# Patient Record
Sex: FEMALE | Race: BLACK OR AFRICAN AMERICAN | Employment: OTHER | RURAL
[De-identification: names, ages, dates, MRNs, and addresses within clinical notes are randomized per-mention and may not be internally consistent; named-entity substitution may affect disease eponyms.]

---

## 2017-01-05 ENCOUNTER — TELEPHONE (OUTPATIENT)
Dept: FAMILY MEDICINE CLINIC | Age: 82
End: 2017-01-05

## 2017-01-05 NOTE — TELEPHONE ENCOUNTER
[facility] called. They need to verify what type of immunization the patient needs. Please call to verify at [phone].

## 2017-02-13 DIAGNOSIS — E78.2 MIXED HYPERLIPIDEMIA: ICD-10-CM

## 2017-02-13 RX ORDER — SIMVASTATIN 40 MG/1
40 TABLET, FILM COATED ORAL
Qty: 90 TAB | Refills: 2 | Status: SHIPPED | OUTPATIENT
Start: 2017-02-13 | End: 2017-06-22 | Stop reason: SDUPTHER

## 2017-02-20 DIAGNOSIS — I10 ESSENTIAL HYPERTENSION WITH GOAL BLOOD PRESSURE LESS THAN 140/90: ICD-10-CM

## 2017-02-20 RX ORDER — LOSARTAN POTASSIUM 100 MG/1
100 TABLET ORAL DAILY
Qty: 90 TAB | Refills: 1 | Status: SHIPPED | OUTPATIENT
Start: 2017-02-20 | End: 2017-06-22 | Stop reason: SDUPTHER

## 2017-02-20 RX ORDER — FUROSEMIDE 20 MG/1
20 TABLET ORAL DAILY
Qty: 90 TAB | Refills: 1 | Status: SHIPPED | OUTPATIENT
Start: 2017-02-20 | End: 2017-04-13 | Stop reason: DRUGHIGH

## 2017-02-20 RX ORDER — HYDRALAZINE HYDROCHLORIDE 25 MG/1
25 TABLET, FILM COATED ORAL 3 TIMES DAILY
Qty: 90 TAB | Refills: 5 | Status: SHIPPED | OUTPATIENT
Start: 2017-02-20 | End: 2017-04-03 | Stop reason: SDUPTHER

## 2017-02-20 RX ORDER — FELODIPINE 10 MG/1
10 TABLET, EXTENDED RELEASE ORAL DAILY
Qty: 90 TAB | Refills: 1 | Status: SHIPPED | OUTPATIENT
Start: 2017-02-20 | End: 2017-03-14 | Stop reason: SDUPTHER

## 2017-02-20 RX ORDER — LEVOTHYROXINE SODIUM 25 UG/1
25 TABLET ORAL
Qty: 90 TAB | Refills: 3 | Status: SHIPPED | OUTPATIENT
Start: 2017-02-20 | End: 2017-04-26 | Stop reason: SDUPTHER

## 2017-03-14 DIAGNOSIS — I10 ESSENTIAL HYPERTENSION WITH GOAL BLOOD PRESSURE LESS THAN 140/90: ICD-10-CM

## 2017-03-14 RX ORDER — FELODIPINE 10 MG/1
10 TABLET, EXTENDED RELEASE ORAL DAILY
Qty: 90 TAB | Refills: 1 | Status: SHIPPED | OUTPATIENT
Start: 2017-03-14 | End: 2017-04-13 | Stop reason: DRUGHIGH

## 2017-03-24 ENCOUNTER — OFFICE VISIT (OUTPATIENT)
Dept: FAMILY MEDICINE CLINIC | Age: 82
End: 2017-03-24

## 2017-03-24 VITALS
DIASTOLIC BLOOD PRESSURE: 59 MMHG | BODY MASS INDEX: 21.97 KG/M2 | SYSTOLIC BLOOD PRESSURE: 144 MMHG | WEIGHT: 124 LBS | OXYGEN SATURATION: 95 % | HEIGHT: 63 IN | TEMPERATURE: 97.8 F | HEART RATE: 74 BPM | RESPIRATION RATE: 20 BRPM

## 2017-03-24 DIAGNOSIS — I10 ESSENTIAL HYPERTENSION: Chronic | ICD-10-CM

## 2017-03-24 DIAGNOSIS — J20.9 ACUTE BRONCHITIS, UNSPECIFIED ORGANISM: Primary | ICD-10-CM

## 2017-03-24 RX ORDER — ALBUTEROL SULFATE 90 UG/1
2 AEROSOL, METERED RESPIRATORY (INHALATION)
Qty: 1 INHALER | Refills: 5 | Status: SHIPPED | OUTPATIENT
Start: 2017-03-24

## 2017-03-24 RX ORDER — AZITHROMYCIN 250 MG/1
TABLET, FILM COATED ORAL
Qty: 6 TAB | Refills: 0 | Status: SHIPPED | OUTPATIENT
Start: 2017-03-24 | End: 2017-03-29

## 2017-03-24 NOTE — PATIENT INSTRUCTIONS
Bronchitis: Care Instructions  Your Care Instructions    Bronchitis is inflammation of the bronchial tubes, which carry air to the lungs. The tubes swell and produce mucus, or phlegm. The mucus and inflamed bronchial tubes make you cough. You may have trouble breathing. Most cases of bronchitis are caused by viruses like those that cause colds. Antibiotics usually do not help and they may be harmful. Bronchitis usually develops rapidly and lasts about 2 to 3 weeks in otherwise healthy people. Follow-up care is a key part of your treatment and safety. Be sure to make and go to all appointments, and call your doctor if you are having problems. It's also a good idea to know your test results and keep a list of the medicines you take. How can you care for yourself at home? · Take all medicines exactly as prescribed. Call your doctor if you think you are having a problem with your medicine. · Get some extra rest.  · Take an over-the-counter pain medicine, such as acetaminophen (Tylenol), ibuprofen (Advil, Motrin), or naproxen (Aleve) to reduce fever and relieve body aches. Read and follow all instructions on the label. · Do not take two or more pain medicines at the same time unless the doctor told you to. Many pain medicines have acetaminophen, which is Tylenol. Too much acetaminophen (Tylenol) can be harmful. · Take an over-the-counter cough medicine that contains dextromethorphan to help quiet a dry, hacking cough so that you can sleep. Avoid cough medicines that have more than one active ingredient. Read and follow all instructions on the label. · Breathe moist air from a humidifier, hot shower, or sink filled with hot water. The heat and moisture will thin mucus so you can cough it out. · Do not smoke. Smoking can make bronchitis worse. If you need help quitting, talk to your doctor about stop-smoking programs and medicines. These can increase your chances of quitting for good.   When should you call for help? Call 911 anytime you think you may need emergency care. For example, call if:  · You have severe trouble breathing. Call your doctor now or seek immediate medical care if:  · You have new or worse trouble breathing. · You cough up dark brown or bloody mucus (sputum). · You have a new or higher fever. · You have a new rash. Watch closely for changes in your health, and be sure to contact your doctor if:  · You cough more deeply or more often, especially if you notice more mucus or a change in the color of your mucus. · You are not getting better as expected. Where can you learn more? Go to http://rafi-alejandro.info/. Enter H333 in the search box to learn more about \"Bronchitis: Care Instructions. \"  Current as of: May 23, 2016  Content Version: 11.1  © 2484-1163 [employer]. Care instructions adapted under license by [employer] (which disclaims liability or warranty for this information). If you have questions about a medical condition or this instruction, always ask your healthcare professional. [address] any warranty or liability for your use of this information.

## 2017-03-24 NOTE — PROGRESS NOTES
Progress Note    Patient: Leann Carmen MRN: [id number]  SSN: [id number]    YOB: 1923  Age: 80 y.o. Sex: female        Chief Complaint   Patient presents with    Cold Symptoms     x1 days, cough         Subjective:     Encounter Diagnoses   Name Primary?  Acute bronchitis, unspecified organism: She has been coughing for several days. She has not had a fever but she did have a sweat last night. The mucus she is coughing up is primarily clear scant amounts. He can hear her audibly wheezing with cough. She has strained a left anterior intercostal muscle from the coughing and will use a throw pillow to brace her chest wall she does cough. I have explained to her that spasms of cough or really an asthma equivalent in this when she should use her inhaler. Yes    Essential hypertension: Her blood pressure is elevated as would be expected from upper respiratory infection. BP Readings from Last 3 Encounters:   03/24/17 144/59   11/22/16 149/62   07/06/16 138/60     The patient reports:  taking medications as instructed, no medication side effects noted, no TIA's, no chest pain on exertion, no dyspnea on exertion, no swelling of ankles. Lab Results   Component Value Date/Time    Sodium 142 11/22/2016 10:34 AM    Potassium 4.2 11/22/2016 10:34 AM    Chloride 101 11/22/2016 10:34 AM    CO2 24 11/22/2016 10:34 AM    Anion gap 10 10/06/2010 10:34 AM    Glucose 88 11/22/2016 10:34 AM    BUN 43 11/22/2016 10:34 AM    Creatinine 1.64 11/22/2016 10:34 AM    BUN/Creatinine ratio 26 11/22/2016 10:34 AM    GFR est AA 31 11/22/2016 10:34 AM    GFR est non-AA 27 11/22/2016 10:34 AM    Calcium 10.9 11/22/2016 10:34 AM    Bilirubin, total 0.5 11/22/2016 10:34 AM    AST (SGOT) 14 11/22/2016 10:34 AM    Alk.  phosphatase 82 11/22/2016 10:34 AM    Protein, total 7.0 11/22/2016 10:34 AM    Albumin 4.4 11/22/2016 10:34 AM    Globulin 3.2 10/06/2010 10:34 AM    A-G Ratio 1.7 11/22/2016 10:34 AM    ALT (SGPT) 8 11/22/2016 10:34 AM     Our goal is to normalize the blood pressure to decrease the risks of strokes and heart attacks. The patient is in agreement with the plan. Current and past medical information:    Current Medications after this visit[de-identified]   Current Outpatient Prescriptions   Medication Sig    azithromycin (ZITHROMAX) 250 mg tablet Take 2 tablets today, then take 1 tablet daily    albuterol (PROVENTIL HFA, VENTOLIN HFA, PROAIR HFA) 90 mcg/actuation inhaler Take 2 Puffs by inhalation every six (6) hours as needed for Wheezing (use her insurance's generic).  felodipine (PLENDIL SR) 10 mg 24 hr tablet Take 1 Tab by mouth daily.  furosemide (LASIX) 20 mg tablet Take 1 Tab by mouth daily. Indications: hypertension    hydrALAZINE (APRESOLINE) 25 mg tablet Take 1 Tab by mouth three (3) times daily. Indications: hypertension    levothyroxine (SYNTHROID) 25 mcg tablet Take 1 Tab by mouth Daily (before breakfast).  losartan (COZAAR) 100 mg tablet Take 1 Tab by mouth daily.  simvastatin (ZOCOR) 40 mg tablet Take 1 Tab by mouth nightly for 90 days. Indications: mixed hyperlipidemia    omega-3 fatty acids-vitamin e (FISH OIL) 1,000 mg Cap Take 1 Cap by mouth.  aspirin delayed-release 81 mg tablet Take 81 mg by mouth daily.  cholecalciferol, vitamin d3, (VITAMIN D) 1,000 unit tablet Take 1,000 Units by mouth daily.  cyanocobalamin (VITAMIN B-12) 1,000 mcg tablet Take 1,000 mcg by mouth daily.  fexofenadine (ALLEGRA) 180 mg tablet Take 1 Tab by mouth daily. For allergies. No current facility-administered medications for this visit.         Patient Active Problem List    Diagnosis Date Noted    Hypertension 05/24/2010     Priority: 1 - One    Hyperlipidemia 05/24/2010     Priority: 1 - One    Arthritis 05/24/2010     Priority: 1 - One    Hx-TIA (transient ischemic attack) 05/24/2010     Priority: 1 - One    Migraine 05/24/2010     Priority: 1 - One    S/P hysterectomy 10/10/2014    Hypothyroidism 08/05/2013    CRI (chronic renal insufficiency) 02/08/2011       Past Medical History:   Diagnosis Date    Arthritis 5/24/2010    Hx-TIA (transient ischemic attack) 5/24/2010    Hyperlipidemia 5/24/2010    Hypertension 5/24/2010    Migraine 5/24/2010       Allergies   Allergen Reactions    Ace Inhibitors Other (comments)     Cough. Also has unclear reaction to ARB's       Past Surgical History:   Procedure Laterality Date    HX GYN      hysterectomy,btl    HX GYN  9/6/11    complete hysterectomy    HX HEENT  10/10. bilateral cataract removal.       Social History     Social History    Marital status:      Spouse name: N/A    Number of children: N/A    Years of education: N/A     Social History Main Topics    Smoking status: Never Smoker    Smokeless tobacco: Never Used    Alcohol use No    Drug use: No    Sexual activity: Not Asked     Other Topics Concern    None     Social History Narrative       Review of Systems   Constitutional: Positive for diaphoresis. Negative for chills, fever, malaise/fatigue and weight loss. HENT: Negative. Negative for hearing loss. Eyes: Negative. Negative for blurred vision and double vision. Respiratory: Positive for cough, sputum production and wheezing. Negative for hemoptysis and shortness of breath. Cardiovascular: Negative. Negative for chest pain, palpitations and orthopnea. Gastrointestinal: Negative. Negative for abdominal pain, blood in stool, heartburn, nausea and vomiting. Genitourinary: Negative. Negative for dysuria, frequency and urgency. Musculoskeletal: Negative. Negative for back pain, myalgias and neck pain. Skin: Negative. Negative for rash. Neurological: Negative. Negative for dizziness, tingling, tremors, weakness and headaches. Endo/Heme/Allergies: Negative. Psychiatric/Behavioral: Negative. Negative for depression.         Objective:     Vitals:    03/24/17 1415   BP: 144/59   Pulse: 74   Resp: 20   Temp: 97.8 °F (36.6 °C)   TempSrc: Oral   SpO2: 95%   Weight: 124 lb (56.2 kg)   Height: 5' 3\" (1.6 m)      Body mass index is 21.97 kg/(m^2). Physical Exam   Constitutional: She is oriented to person, place, and time and well-developed, well-nourished, and in no distress. No distress. HENT:   Head: Normocephalic and atraumatic. Mouth/Throat: Oropharynx is clear and moist.   Eyes: Conjunctivae are normal.   Neck: No tracheal deviation present. No thyromegaly present. Cardiovascular: Normal rate, regular rhythm and normal heart sounds. No murmur heard. Pulmonary/Chest: Effort normal. No respiratory distress. She has a wheezy cough and bilateral wet rhonchi in both bases. Abdominal: Soft. She exhibits no distension. Lymphadenopathy:     She has no cervical adenopathy. Neurological: She is alert and oriented to person, place, and time. Skin: Skin is warm. No rash noted. She is not diaphoretic. No erythema. Psychiatric: Mood and affect normal.   Nursing note and vitals reviewed. Health Maintenance Due   Topic Date Due    MEDICARE YEARLY EXAM  02/25/2017         Assessment and orders:       ICD-10-CM ICD-9-CM    1. Acute bronchitis, unspecified organism-with wheezing  J20.9 466.0  azithromycin 250-2 today and one for days 2 through 5   Albuterol inhaler-personally instructed her on how best to use it. 2. Essential hypertension I10 401.9  follow this at home, recheck 1 well    3. Wheezing. Patient has no history                                         albuterol inhaler             of asthma    Plan of care:  Discussed diagnoses in detail with patient. Medication risks/benefits/side effects discussed with patient. All of the patient's questions were addressed. The patient understands and agrees with our plan of care.     The patient knows to call back if they are unsure of or forget any changes we discussed today or if the symptoms change. The patient received an After-Visit Summary which contains VS, orders, medication list and allergy list. This can be used as a \"mini-medical record\" should they have to seek medical care while out of town. Patient Care Team:  Kervin Claros MD as PCP - General    Follow-up Disposition:  Return if symptoms worsen or fail to improve.     Future Appointments  Date Time Provider Tiago De Oliveira   4/3/2017 3:05 PM Kervin Claros MD [facility] SCHED   5/23/2017 9:50 AM Kervin Claros MD [facility] SCHED       Signed By: Kervin Claros MD     March 24, 2017

## 2017-03-24 NOTE — PROGRESS NOTES
Reviewed record in preparation for visit and have necessary documentation  Pt did not bring medication to office visit for review  Information was given to pt on Advanced Directives, Living Will  opportunity was given for questions  Goals that were addressed and/or need to be completed during or after this appointment include   Health Maintenance Due   Topic Date Due    MEDICARE YEARLY EXAM  02/25/2017

## 2017-03-24 NOTE — MR AVS SNAPSHOT
Visit Information Date & Time Provider Department Dept. Phone Encounter #  
 3/24/2017  2:25 PM James Riggs [address] [phone] [id number] Follow-up Instructions Return if symptoms worsen or fail to improve. Your Appointments 4/3/2017  3:05 PM  
Medicare Physical with James Riggs MD  
[address] ([facility]) Appt Note: [address] ; provider cancellation on 02/24/2017 [address]   
[phone]  
  
   
 [facility] [address]  
  
    
 5/23/2017  9:50 AM  
ROUTINE CARE with James Riggs MD  
[address] [facility]) Appt Note: 5 mo f/u-HTN (fasting) [address] [facility] [address] [address]  
[place] [address] [address] Upcoming Health Maintenance Date Due  
 MEDICARE YEARLY EXAM 2/25/2017 GLAUCOMA SCREENING Q2Y 6/2/2017 DTaP/Tdap/Td series (2 - Td) 1/6/2027 Allergies as of 3/24/2017  Review Complete On: 3/24/2017 By: Catalina Shaikh LPN Severity Noted Reaction Type Reactions Ace Inhibitors  05/24/2010    Other (comments) Cough. Also has unclear reaction to ARB's  
  
Current Immunizations  Reviewed on 2/25/2016 Name Date H1N1 FLU VACCINE 2/24/2010 Influenza Vaccine 10/15/2015, 10/10/2014, 10/4/2013, 2/7/2013 Influenza Vaccine (Quad) PF 11/22/2016 Influenza Vaccine Split 10/14/2011, 10/6/2010 Influenza Vaccine Whole 12/6/2009 Pneumococcal Conjugate (PCV-13) 12/10/2015 Pneumococcal Vaccine (Unspecified Type) 10/6/2008, 9/8/2003 TD Vaccine 6/21/2006 Tdap 1/6/2017 Not reviewed this visit You Were Diagnosed With   
  
 Codes Comments Acute bronchitis, unspecified organism    -  Primary ICD-10-CM: J20.9 ICD-9-CM: 466.0  Essential hypertension     ICD-10-CM: I10 
 ICD-9-CM: 401.9 Pure hypercholesterolemia     ICD-10-CM: E78.00 ICD-9-CM: 272.0 Vitals BP Pulse Temp Resp Height(growth percentile) Weight(growth percentile) 144/59 (BP 1 Location: Left arm, BP Patient Position: Sitting) 74 97.8 °F (36.6 °C) (Oral) 20 5' 3\" (1.6 m) 124 lb (56.2 kg) SpO2 BMI OB Status Smoking Status 95% 21.97 kg/m2 Hysterectomy Never Smoker Vitals History BMI and BSA Data Body Mass Index Body Surface Area  
 21.97 kg/m 2 1.58 m 2 Preferred Pharmacy Pharmacy Name Phone [facility] [address] [facility] [address] [phone] Your Updated Medication List  
  
   
This list is accurate as of: 3/24/17  2:48 PM.  Always use your most recent med list.  
  
  
  
  
 albuterol 90 mcg/actuation inhaler Commonly known as:  PROVENTIL HFA, VENTOLIN HFA, PROAIR HFA Take 2 Puffs by inhalation every six (6) hours as needed for Wheezing (use her insurance's generic). aspirin delayed-release 81 mg tablet Take 81 mg by mouth daily. azithromycin 250 mg tablet Commonly known as:  Nusrat Sherrie Take 2 tablets today, then take 1 tablet daily  
  
 felodipine 10 mg 24 hr tablet Commonly known as:  PLENDIL SR Take 1 Tab by mouth daily. fexofenadine 180 mg tablet Commonly known as:  Liz Cyphers Take 1 Tab by mouth daily. For allergies. FISH OIL 1,000 mg Cap Generic drug:  omega-3 fatty acids-vitamin e Take 1 Cap by mouth. furosemide 20 mg tablet Commonly known as:  LASIX Take 1 Tab by mouth daily. Indications: hypertension  
  
 hydrALAZINE 25 mg tablet Commonly known as:  APRESOLINE Take 1 Tab by mouth three (3) times daily. Indications: hypertension  
  
 levothyroxine 25 mcg tablet Commonly known as:  SYNTHROID Take 1 Tab by mouth Daily (before breakfast). losartan 100 mg tablet Commonly known as:  COZAAR Take 1 Tab by mouth daily. simvastatin 40 mg tablet Commonly known as:  ZOCOR Take 1 Tab by mouth nightly for 90 days. Indications: mixed hyperlipidemia VITAMIN B-12 1,000 mcg tablet Generic drug:  cyanocobalamin Take 1,000 mcg by mouth daily. VITAMIN D3 1,000 unit tablet Generic drug:  cholecalciferol Take 1,000 Units by mouth daily. Prescriptions Sent to Pharmacy Refills  
 azithromycin (ZITHROMAX) 250 mg tablet 0 Sig: Take 2 tablets today, then take 1 tablet daily Class: Normal  
 Pharmacy: [id number] [place] [address] Ph #: [phone]  
 albuterol (PROVENTIL HFA, VENTOLIN HFA, PROAIR HFA) 90 mcg/actuation inhaler 5 Sig: Take 2 Puffs by inhalation every six (6) hours as needed for Wheezing (use her insurance's generic). Class: Normal  
 Pharmacy: [id number] [place] [address] Ph #: [phone] Route: Inhalation Follow-up Instructions Return if symptoms worsen or fail to improve. Patient Instructions Bronchitis: Care Instructions Your Care Instructions Bronchitis is inflammation of the bronchial tubes, which carry air to the lungs. The tubes swell and produce mucus, or phlegm. The mucus and inflamed bronchial tubes make you cough. You may have trouble breathing. Most cases of bronchitis are caused by viruses like those that cause colds. Antibiotics usually do not help and they may be harmful. Bronchitis usually develops rapidly and lasts about 2 to 3 weeks in otherwise healthy people. Follow-up care is a key part of your treatment and safety. Be sure to make and go to all appointments, and call your doctor if you are having problems. It's also a good idea to know your test results and keep a list of the medicines you take. How can you care for yourself at home? · Take all medicines exactly as prescribed. Call your doctor if you think you are having a problem with your medicine.  
· Get some extra rest. 
 · Take an over-the-counter pain medicine, such as acetaminophen (Tylenol), ibuprofen (Advil, Motrin), or naproxen (Aleve) to reduce fever and relieve body aches. Read and follow all instructions on the label. · Do not take two or more pain medicines at the same time unless the doctor told you to. Many pain medicines have acetaminophen, which is Tylenol. Too much acetaminophen (Tylenol) can be harmful. · Take an over-the-counter cough medicine that contains dextromethorphan to help quiet a dry, hacking cough so that you can sleep. Avoid cough medicines that have more than one active ingredient. Read and follow all instructions on the label. · Breathe moist air from a humidifier, hot shower, or sink filled with hot water. The heat and moisture will thin mucus so you can cough it out. · Do not smoke. Smoking can make bronchitis worse. If you need help quitting, talk to your doctor about stop-smoking programs and medicines. These can increase your chances of quitting for good. When should you call for help? Call 911 anytime you think you may need emergency care. For example, call if: 
· You have severe trouble breathing. Call your doctor now or seek immediate medical care if: 
· You have new or worse trouble breathing. · You cough up dark brown or bloody mucus (sputum). · You have a new or higher fever. · You have a new rash. Watch closely for changes in your health, and be sure to contact your doctor if: 
· You cough more deeply or more often, especially if you notice more mucus or a change in the color of your mucus. · You are not getting better as expected. Where can you learn more? Go to http://rafi-alejandro.info/. Enter H333 in the search box to learn more about \"Bronchitis: Care Instructions. \" Current as of: May 23, 2016 Content Version: 11.1 © 9244-5471 [employer].  Care instructions adapted under license by [address] (which disclaims liability or warranty for this information). If you have questions about a medical condition or this instruction, always ask your healthcare professional. [address] any warranty or liability for your use of this information. Introducing [facility]! [employer] introduces [employer] patient portal. Now you can access parts of your medical record, email your doctor's office, and request medication refills online. 1. In your internet browser, go to [email or web address] 2. Click on the First Time User? Click Here link in the Sign In box. You will see the New Member Sign Up page. 3. Enter your [employer] Access Code exactly as it appears below. You will not need to use this code after youve completed the sign-up process. If you do not sign up before the expiration date, you must request a new code. · [employer] Access Code: [email or web address] Expires: 6/22/2017  2:04 PM 
 
4. Enter the last four digits of your Social Security Number (xxxx) and Date of Birth (mm/dd/yyyy) as indicated and click Submit. You will be taken to the next sign-up page. 5. Create a [employer] ID. This will be your [employer] login ID and cannot be changed, so think of one that is secure and easy to remember. 6. Create a [employer] password. You can change your password at any time. 7. Enter your Password Reset Question and Answer. This can be used at a later time if you forget your password. 8. Enter your e-mail address. You will receive e-mail notification when new information is available in [address] 9. Click Sign Up. You can now view and download portions of your medical record. 10. Click the Download Summary menu link to download a portable copy of your medical information. If you have questions, please visit the Frequently Asked Questions section of the [employer] website.  Remember, [employer] is NOT to be used for urgent needs. For medical emergencies, dial 911. Now available from your iPhone and Android! Please provide this summary of care documentation to your next provider. Your primary care clinician is listed as [address]. If you have any questions after today's visit, please call [phone].

## 2017-04-03 ENCOUNTER — OFFICE VISIT (OUTPATIENT)
Dept: FAMILY MEDICINE CLINIC | Age: 82
End: 2017-04-03

## 2017-04-03 VITALS
BODY MASS INDEX: 21.97 KG/M2 | OXYGEN SATURATION: 97 % | TEMPERATURE: 98.3 F | HEIGHT: 63 IN | HEART RATE: 67 BPM | WEIGHT: 124 LBS | SYSTOLIC BLOOD PRESSURE: 175 MMHG | DIASTOLIC BLOOD PRESSURE: 66 MMHG | RESPIRATION RATE: 16 BRPM

## 2017-04-03 DIAGNOSIS — Z13.31 SCREENING FOR DEPRESSION: ICD-10-CM

## 2017-04-03 DIAGNOSIS — I10 ESSENTIAL HYPERTENSION WITH GOAL BLOOD PRESSURE LESS THAN 140/90: ICD-10-CM

## 2017-04-03 DIAGNOSIS — Z00.00 MEDICARE ANNUAL WELLNESS VISIT, SUBSEQUENT: Primary | ICD-10-CM

## 2017-04-03 DIAGNOSIS — Z13.39 SCREENING FOR ALCOHOLISM: ICD-10-CM

## 2017-04-03 RX ORDER — HYDRALAZINE HYDROCHLORIDE 50 MG/1
50 TABLET, FILM COATED ORAL 3 TIMES DAILY
Qty: 90 TAB | Refills: 5 | Status: SHIPPED | OUTPATIENT
Start: 2017-04-03 | End: 2017-04-13 | Stop reason: SDUPTHER

## 2017-04-03 NOTE — PATIENT INSTRUCTIONS
Learning About High Blood Pressure  What is high blood pressure? Blood pressure is a measure of how hard the blood pushes against the walls of your arteries. It's normal for blood pressure to go up and down throughout the day, but if it stays up, you have high blood pressure. Another name for high blood pressure is hypertension. Two numbers tell you your blood pressure. The first number is the systolic pressure. It shows how hard the blood pushes when your heart is pumping. The second number is the diastolic pressure. It shows how hard the blood pushes between heartbeats, when your heart is relaxed and filling with blood. A blood pressure of less than 120/80 (say \"120 over 80\") is ideal for an adult. High blood pressure is 140/90 or higher. You have high blood pressure if your top number is 140 or higher or your bottom number is 90 or higher, or both. Many people fall into the category in between, called prehypertension. People with prehypertension need to make lifestyle changes to bring their blood pressure down and help prevent or delay high blood pressure. What happens when you have high blood pressure? · Blood flows through your arteries with too much force. Over time, this damages the walls of your arteries. But you can't feel it. High blood pressure usually doesn't cause symptoms. · Fat and calcium start to build up in your arteries. This buildup is called plaque. Plaque makes your arteries narrower and stiffer. Blood can't flow through them as easily. · This lack of good blood flow starts to damage some of the organs in your body. This can lead to problems such as coronary artery disease and heart attack, heart failure, stroke, kidney failure, and eye damage. How can you prevent high blood pressure? · Stay at a healthy weight. · Try to limit how much sodium you eat to less than 2,300 milligrams (mg) a day.  If you limit your sodium to 1,500 mg a day, you can lower your blood pressure even more.  ¨ Buy foods that are labeled \"unsalted,\" \"sodium-free,\" or \"low-sodium. \" Foods labeled \"reduced-sodium\" and \"light sodium\" may still have too much sodium. ¨ Flavor your food with garlic, lemon juice, onion, vinegar, herbs, and spices instead of salt. Do not use soy sauce, steak sauce, onion salt, garlic salt, mustard, or ketchup on your food. ¨ Use less salt (or none) when recipes call for it. You can often use half the salt a recipe calls for without losing flavor. · Be physically active. Get at least 30 minutes of exercise on most days of the week. Walking is a good choice. You also may want to do other activities, such as running, swimming, cycling, or playing tennis or team sports. · Limit alcohol to 2 drinks a day for men and 1 drink a day for women. · Eat plenty of fruits, vegetables, and low-fat dairy products. Eat less saturated and total fats. How is high blood pressure treated? · Your doctor will suggest making lifestyle changes. For example, your doctor may ask you to eat healthy foods, quit smoking, lose extra weight, and be more active. · If lifestyle changes don't help enough or your blood pressure is very high, you will have to take medicine every day. Follow-up care is a key part of your treatment and safety. Be sure to make and go to all appointments, and call your doctor if you are having problems. It's also a good idea to know your test results and keep a list of the medicines you take. Where can you learn more? Go to http://rafi-alejandro.info/. Enter P501 in the search box to learn more about \"Learning About High Blood Pressure. \"  Current as of: March 23, 2016  Content Version: 11.2  © 9670-2868 [employer]. Care instructions adapted under license by [employer] (which disclaims liability or warranty for this information).  If you have questions about a medical condition or this instruction, always ask your healthcare professional. [employer] disclaims any warranty or liability for your use of this information. Billy Simpson with [facility]  [address] [place] [address],  [address] [place], [address] [place] [address]  [phone]    Monitor blood pressure outside the office several times weekly at different times during the day and evening. Bring the record to me in 3 weeks for review.     Blood Pressure Record     Patient Name:  ______________________ :  ______________________    Date/Time BP Reading Pulse

## 2017-04-03 NOTE — MR AVS SNAPSHOT
Visit Information Date & Time Provider Department Dept. Phone Encounter #  
 4/3/2017  3:05 PM April Guadalupe  [facility] [phone] [id number] Follow-up Instructions Return for 2 weeks for BP check. Sola Brittni Your Appointments 5/23/2017  9:50 AM  
ROUTINE CARE with April Guadalupe MD  
[facility]) Appt Note: 5 mo f/u-HTN (fasting) [address] [address]  
[place] [address] [address] Upcoming Health Maintenance Date Due  
 MEDICARE YEARLY EXAM 2/25/2017 GLAUCOMA SCREENING Q2Y 6/2/2017 DTaP/Tdap/Td series (2 - Td) 1/6/2027 Allergies as of 4/3/2017  Review Complete On: 4/3/2017 By: April Guadalupe MD  
  
 Severity Noted Reaction Type Reactions Ace Inhibitors  05/24/2010    Other (comments) Cough. Also has unclear reaction to ARB's  
  
Current Immunizations  Reviewed on 2/25/2016 Name Date H1N1 FLU VACCINE 2/24/2010 Influenza Vaccine 10/15/2015, 10/10/2014, 10/4/2013, 2/7/2013 Influenza Vaccine (Quad) PF 11/22/2016 Influenza Vaccine Split 10/14/2011, 10/6/2010 Influenza Vaccine Whole 12/6/2009 Pneumococcal Conjugate (PCV-13) 12/10/2015 Pneumococcal Vaccine (Unspecified Type) 10/6/2008, 9/8/2003 TD Vaccine 6/21/2006 Tdap 1/6/2017 Not reviewed this visit You Were Diagnosed With   
  
 Codes Comments Medicare annual wellness visit, subsequent    -  Primary ICD-10-CM: Z00.00 ICD-9-CM: V70.0 Essential hypertension with goal blood pressure less than 140/90     ICD-10-CM: I10 
ICD-9-CM: 401.9 Screening for alcoholism     ICD-10-CM: Z13.89 ICD-9-CM: V79.1 Screening for depression     ICD-10-CM: Z13.89 ICD-9-CM: V79.0 Vitals BP Pulse Temp Resp Height(growth percentile) Weight(growth percentile) 175/66 (BP 1 Location: Left arm, BP Patient Position: Sitting) 67 98.3 °F (36.8 °C) (Oral) 16 5' 3\" (1.6 m) 124 lb (56.2 kg) SpO2 BMI OB Status Smoking Status 97% 21.97 kg/m2 Hysterectomy Never Smoker Vitals History BMI and BSA Data Body Mass Index Body Surface Area  
 21.97 kg/m 2 1.58 m 2 Preferred Pharmacy Pharmacy Name Phone [facility] 79 [phone] Your Updated Medication List  
  
   
This list is accurate as of: 4/3/17  3:33 PM.  Always use your most recent med list.  
  
  
  
  
 albuterol 90 mcg/actuation inhaler Commonly known as:  PROVENTIL HFA, VENTOLIN HFA, PROAIR HFA Take 2 Puffs by inhalation every six (6) hours as needed for Wheezing (use her insurance's generic). aspirin delayed-release 81 mg tablet Take 81 mg by mouth daily. felodipine 10 mg 24 hr tablet Commonly known as:  PLENDIL SR Take 1 Tab by mouth daily. fexofenadine 180 mg tablet Commonly known as:  Luellen Den Take 1 Tab by mouth daily. For allergies. FISH OIL 1,000 mg Cap Generic drug:  omega-3 fatty acids-vitamin e Take 1 Cap by mouth. furosemide 20 mg tablet Commonly known as:  LASIX Take 1 Tab by mouth daily. Indications: hypertension  
  
 hydrALAZINE 50 mg tablet Commonly known as:  APRESOLINE Take 1 Tab by mouth three (3) times daily. Indications: hypertension  
  
 levothyroxine 25 mcg tablet Commonly known as:  SYNTHROID Take 1 Tab by mouth Daily (before breakfast). losartan 100 mg tablet Commonly known as:  COZAAR Take 1 Tab by mouth daily. simvastatin 40 mg tablet Commonly known as:  ZOCOR Take 1 Tab by mouth nightly for 90 days. Indications: mixed hyperlipidemia VITAMIN B-12 1,000 mcg tablet Generic drug:  cyanocobalamin Take 1,000 mcg by mouth daily. VITAMIN D3 1,000 unit tablet Generic drug:  cholecalciferol Take 1,000 Units by mouth daily. Prescriptions Sent to Pharmacy Refills  
 hydrALAZINE (APRESOLINE) 50 mg tablet 5 Sig: Take 1 Tab by mouth three (3) times daily. Indications: hypertension Class: Normal  
 Pharmacy: [address] #: [phone] Route: Oral  
  
We Performed the Following IN ANNUAL ALCOHOL SCREEN 15 MIN [id number] [facility]] [address] [address] [employer] [ [facility]] Follow-up Instructions Return for 2 weeks for BP check. Shekhar Brooks Patient Instructions Learning About High Blood Pressure What is high blood pressure? Blood pressure is a measure of how hard the blood pushes against the walls of your arteries. It's normal for blood pressure to go up and down throughout the day, but if it stays up, you have high blood pressure. Another name for high blood pressure is hypertension. Two numbers tell you your blood pressure. The first number is the systolic pressure. It shows how hard the blood pushes when your heart is pumping. The second number is the diastolic pressure. It shows how hard the blood pushes between heartbeats, when your heart is relaxed and filling with blood. A blood pressure of less than 120/80 (say \"120 over 80\") is ideal for an adult. High blood pressure is 140/90 or higher. You have high blood pressure if your top number is 140 or higher or your bottom number is 90 or higher, or both. Many people fall into the category in between, called prehypertension. People with prehypertension need to make lifestyle changes to bring their blood pressure down and help prevent or delay high blood pressure. What happens when you have high blood pressure? · Blood flows through your arteries with too much force. Over time, this damages the walls of your arteries. But you can't feel it. High blood pressure usually doesn't cause symptoms. · Fat and calcium start to build up in your arteries. This buildup is called plaque. Plaque makes your arteries narrower and stiffer. Blood can't flow through them as easily. · This lack of good blood flow starts to damage some of the organs in your body. This can lead to problems such as coronary artery disease and heart attack, heart failure, stroke, kidney failure, and eye damage. How can you prevent high blood pressure? · Stay at a healthy weight. · Try to limit how much sodium you eat to less than 2,300 milligrams (mg) a day. If you limit your sodium to 1,500 mg a day, you can lower your blood pressure even more. ¨ Buy foods that are labeled \"unsalted,\" \"sodium-free,\" or \"low-sodium. \" Foods labeled \"reduced-sodium\" and \"light sodium\" may still have too much sodium. ¨ Flavor your food with garlic, lemon juice, onion, vinegar, herbs, and spices instead of salt. Do not use soy sauce, steak sauce, onion salt, garlic salt, mustard, or ketchup on your food. ¨ Use less salt (or none) when recipes call for it. You can often use half the salt a recipe calls for without losing flavor. · Be physically active. Get at least 30 minutes of exercise on most days of the week. Walking is a good choice. You also may want to do other activities, such as running, swimming, cycling, or playing tennis or team sports. · Limit alcohol to 2 drinks a day for men and 1 drink a day for women. · Eat plenty of fruits, vegetables, and low-fat dairy products. Eat less saturated and total fats. How is high blood pressure treated? · Your doctor will suggest making lifestyle changes. For example, your doctor may ask you to eat healthy foods, quit smoking, lose extra weight, and be more active. · If lifestyle changes don't help enough or your blood pressure is very high, you will have to take medicine every day. Follow-up care is a key part of your treatment and safety.  Be sure to make and go to all appointments, and call your doctor if you are having problems. It's also a good idea to know your test results and keep a list of the medicines you take. Where can you learn more? Go to http://rafi-alejandro.info/. Enter P501 in the search box to learn more about \"Learning About High Blood Pressure. \" Current as of: 2016 Content Version: 11.2 © 3353-6677 [employer]. Care instructions adapted under license by [employer] (which disclaims liability or warranty for this information). If you have questions about a medical condition or this instruction, always ask your healthcare professional. [place] [address] any warranty or liability for your use of this information. [address] Affiliated with [address] [place] [address]., [place], [address] [place] [address] 
[phone] Monitor blood pressure outside the office several times weekly at different times during the day and evening. Bring the record to me in 3 weeks for review. Blood Pressure Record Patient Name:  ______________________ :  ______________________ Date/Time BP Reading Pulse Introducing [facility]! Kae Milan introduces [employer] patient portal. Now you can access parts of your medical record, email your doctor's office, and request medication refills online. 1. In your internet browser, go to [email or web address] 2. Click on the First Time User? Click Here link in the Sign In box. You will see the New Member Sign Up page. 3. Enter your [employer] Access Code exactly as it appears below. You will not need to use this code after youve completed the sign-up process.  If you do not sign up before the expiration date, you must request a new code. · [employer] Access Code: [email or web address] Expires: 6/22/2017  2:04 PM 
 
4. Enter the last four digits of your Social Security Number (xxxx) and Date of Birth (mm/dd/yyyy) as indicated and click Submit. You will be taken to the next sign-up page. 5. Create a Roadtrippers ID. This will be your Road[employer]ppers login ID and cannot be changed, so think of one that is secure and easy to remember. 6. Create a [employer] password. You can change your password at any time. 7. Enter your Password Reset Question and Answer. This can be used at a later time if you forget your password. 8. Enter your e-mail address. You will receive e-mail notification when new information is available in [address] 9. Click Sign Up. You can now view and download portions of your medical record. 10. Click the Download Summary menu link to download a portable copy of your medical information. If you have questions, please visit the Frequently Asked Questions section of the Road[employer]ppers website. Remember, [employer]ppers is NOT to be used for urgent needs. For medical emergencies, dial 911. Now available from your iPhone and Android! Please provide this summary of care documentation to your next provider. Your primary care clinician is listed as [address]. If you have any questions after today's visit, please call [phone].

## 2017-04-03 NOTE — PROGRESS NOTES
Reviewed record in preparation for visit and have necessary documentation  Pt did not bring medication to office visit for review   Advanced Directives, Living Will on file    Goals that were addressed and/or need to be completed during or after this appointment include   Health Maintenance Due   Topic Date Due    MEDICARE YEARLY EXAM  02/25/2017    GLAUCOMA SCREENING Q2Y  06/02/2017     Home /59,66

## 2017-04-13 ENCOUNTER — OFFICE VISIT (OUTPATIENT)
Dept: FAMILY MEDICINE CLINIC | Age: 82
End: 2017-04-13

## 2017-04-13 ENCOUNTER — TELEPHONE (OUTPATIENT)
Dept: FAMILY MEDICINE CLINIC | Age: 82
End: 2017-04-13

## 2017-04-13 VITALS
RESPIRATION RATE: 20 BRPM | BODY MASS INDEX: 22.36 KG/M2 | HEART RATE: 72 BPM | DIASTOLIC BLOOD PRESSURE: 76 MMHG | HEIGHT: 63 IN | SYSTOLIC BLOOD PRESSURE: 156 MMHG | OXYGEN SATURATION: 97 % | TEMPERATURE: 97.8 F | WEIGHT: 126.2 LBS

## 2017-04-13 DIAGNOSIS — R60.0 BILATERAL EDEMA OF LOWER EXTREMITY: ICD-10-CM

## 2017-04-13 DIAGNOSIS — I13.10 MALIGNANT HTN WITH HEART DISEASE, W/O CHF, WITH CHRONIC KIDNEY DISEASE: Primary | ICD-10-CM

## 2017-04-13 RX ORDER — HYDRALAZINE HYDROCHLORIDE 50 MG/1
50 TABLET, FILM COATED ORAL 4 TIMES DAILY
Qty: 90 TAB | Refills: 5 | Status: SHIPPED | OUTPATIENT
Start: 2017-04-13 | End: 2017-08-21 | Stop reason: SDUPTHER

## 2017-04-13 RX ORDER — FELODIPINE 5 MG/1
5 TABLET, EXTENDED RELEASE ORAL DAILY
Qty: 30 TAB | Refills: 1 | Status: SHIPPED | OUTPATIENT
Start: 2017-04-13 | End: 2017-05-26 | Stop reason: SDUPTHER

## 2017-04-13 RX ORDER — FUROSEMIDE 40 MG/1
40 TABLET ORAL DAILY
Qty: 30 TAB | Refills: 1 | Status: SHIPPED | OUTPATIENT
Start: 2017-04-13 | End: 2017-05-26 | Stop reason: SDUPTHER

## 2017-04-13 NOTE — PROGRESS NOTES
Health Maintenance Due   Topic Date Due    GLAUCOMA SCREENING Q2Y  06/02/2017     Extended / Orthostatic Vitals:  Patient Position 2: Supine  BP 2: (!) 201/72  Pulse 2: 68  Patient Position 3: Sitting  BP 3: 195/79  Pulse 3: 68  Patient Position 4: Standing  BP 4: 195/79  Pulse 4: 72

## 2017-04-13 NOTE — PROGRESS NOTES
Clinic Note   Subjective:   Varun Garza is a 80 y.o. female with history of HTN, Hypothyroidism, CRI , Hyperlipidemia, Arthritis, TIA ( 1983)   CC: Swollen legs  and elevated blood pressure   History provided by patient     HPI:    Pt called the clinic due to severe bilateral feet swelling since increasing her hyralazine 25mg tid to 50mg tid and after increasing plenidil  5 mg to10mg . With associated elevated blood pressure. Home blood pressure sitting 155/63, pulse 60, blood pressure standing 180/76 pulse 71. Pt was told to come in for a blood pressure check. Pt was recently took two trips to [place]. She reports the drive 1 hour long one way and she did the drive twice last week to drop off her sister. She reports wearing heels and does not elevate her legs when sitting at home. Pt has a poor appetite at baseline but has eaten more fast food than normal due to the driving trips. She reports slight generalized headache that resolves on its own. Pt denies chest pain, fevers, nausea, dizziness, vision changes, difficulty swallowing, dyspnea. Current Outpatient Prescriptions on File Prior to Visit   Medication Sig Dispense Refill    albuterol (PROVENTIL HFA, VENTOLIN HFA, PROAIR HFA) 90 mcg/actuation inhaler Take 2 Puffs by inhalation every six (6) hours as needed for Wheezing (use her insurance's generic). 1 Inhaler 5    levothyroxine (SYNTHROID) 25 mcg tablet Take 1 Tab by mouth Daily (before breakfast). 90 Tab 3    losartan (COZAAR) 100 mg tablet Take 1 Tab by mouth daily. 90 Tab 1    simvastatin (ZOCOR) 40 mg tablet Take 1 Tab by mouth nightly for 90 days. Indications: mixed hyperlipidemia 90 Tab 2    fexofenadine (ALLEGRA) 180 mg tablet Take 1 Tab by mouth daily. For allergies. 30 Tab 5    omega-3 fatty acids-vitamin e (FISH OIL) 1,000 mg Cap Take 1 Cap by mouth.  aspirin delayed-release 81 mg tablet Take 81 mg by mouth daily.       cholecalciferol, vitamin d3, (VITAMIN D) 1,000 unit tablet Take 1,000 Units by mouth daily.  cyanocobalamin (VITAMIN B-12) 1,000 mcg tablet Take 1,000 mcg by mouth daily. No current facility-administered medications on file prior to visit. Past Medical History:   Diagnosis Date    Arthritis 5/24/2010    Hx-TIA (transient ischemic attack) 5/24/2010    Hyperlipidemia 5/24/2010    Hypertension 5/24/2010    Migraine 5/24/2010       Social History     Social History    Marital status:      Spouse name: N/A    Number of children: N/A    Years of education: N/A     Occupational History    Not on file. Social History Main Topics    Smoking status: Never Smoker    Smokeless tobacco: Never Used    Alcohol use No    Drug use: No    Sexual activity: Not on file     Other Topics Concern    Not on file     Social History Narrative       Review of Systems   Constitutional: Negative for chills and fever. Respiratory: Negative for cough and hemoptysis. Cardiovascular: Positive for leg swelling. Negative for chest pain. Gastrointestinal: Negative for abdominal pain, nausea and vomiting. Genitourinary: Negative for dysuria. Musculoskeletal: Negative for myalgias. Skin: Negative for itching and rash. Neurological: Negative for dizziness, tingling, sensory change, focal weakness and headaches. Objective:     Visit Vitals    /76    Pulse 72    Temp 97.8 °F (36.6 °C) (Oral)    Resp 20    Ht 5' 3\" (1.6 m)    Wt 126 lb 3.2 oz (57.2 kg)    SpO2 97%    BMI 22.36 kg/m2      Physical Exam:  Physical Exam   Constitutional: She is oriented to person, place, and time. She appears well-developed and well-nourished. HENT:   Head: Normocephalic and atraumatic. Eyes: Conjunctivae are normal. Pupils are equal, round, and reactive to light. Right eye exhibits no discharge. Left eye exhibits no discharge. No scleral icterus. Neck: Normal range of motion. Neck supple.    Cardiovascular: Normal rate, regular rhythm, normal heart sounds and intact distal pulses. Exam reveals no gallop and no friction rub. No murmur heard. +1 pitting edema bilaterally up to the ankles   Pulmonary/Chest: Effort normal and breath sounds normal. No accessory muscle usage. No respiratory distress. She has no wheezes. She has no rales. She exhibits no tenderness. Abdominal: Soft. Bowel sounds are normal. There is no tenderness. Musculoskeletal: Normal range of motion. She exhibits no edema, tenderness or deformity. Negative del sign    Neurological: She is alert and oriented to person, place, and time. No cranial nerve deficit. Skin: Skin is warm and dry. No rash noted. No erythema. Nursing note and vitals reviewed. Assessment and orders:       ICD-10-CM ICD-9-CM    1. 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  Bilateral edema of lower extremity R60.0 782.3 AMB POC EKG ROUTINE W/ 12 LEADS, INTER & REP      furosemide (LASIX) 40 mg tablet      METABOLIC PANEL, COMPREHENSIVE      CBC W/O DIFF     Orders Placed This Encounter    METABOLIC PANEL, COMPREHENSIVE    CBC W/O DIFF    CKD REPORT    AMB POC EKG ROUTINE W/ 12 LEADS, INTER & REP    felodipine (PLENDIL SR) 5 mg 24 hr tablet    furosemide (LASIX) 40 mg tablet    hydrALAZINE (APRESOLINE) 50 mg tablet    DISCONTD: nitroglycerin (NITROBID) 2 % ointment    DISCONTD: nitroglycerin (NITROBID) 2 % ointment    nitroglycerin (NITROBID) 2 % ointment     Orders Placed This Encounter    METABOLIC PANEL, COMPREHENSIVE    CBC W/O DIFF    CKD REPORT    AMB POC EKG ROUTINE W/ 12 LEADS, INTER & REP     Order Specific Question:   Reason for Exam:     Answer:   swelling    felodipine (PLENDIL SR) 5 mg 24 hr tablet     Sig: Take 1 Tab by mouth daily. Dispense:  30 Tab     Refill:  1    furosemide (LASIX) 40 mg tablet     Sig: Take 1 Tab by mouth daily. Dispense:  30 Tab     Refill:  1    hydrALAZINE (APRESOLINE) 50 mg tablet     Sig: Take 1 Tab by mouth four (4) times daily. Indications: hypertension     Dispense:  90 Tab     Refill:  5    DISCONTD: nitroglycerin (NITROBID) 2 % ointment     Sig: Apply 1 Inch to affected area every four (4) hours. Use when systolic blood pressure is > 170, wait 30 mins before standing, if orthostatic then remove at least an 1/2 of inch. Dispense:  60 g     Refill:  0    DISCONTD: nitroglycerin (NITROBID) 2 % ointment     Sig: Apply 0.5 Inches to affected area every four (4) hours. Dispense:  60 g     Refill:  0    nitroglycerin (NITROBID) 2 % ointment     Sig: Apply 0.5 Inches to affected area every four (4) hours. Apply 1/2 inch to the chest when systolic blood pressure is > 170. Dispense:  60 g     Refill:  0     Patricia was seen today for blood pressure check, ankle swelling and leg swelling. 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 -     furosemide (LASIX) 40 mg tablet; Take 1 Tab by mouth daily. -     hydrALAZINE (APRESOLINE) 50 mg tablet; Take 1 Tab by mouth four (4) times daily. Indications: hypertension  -     METABOLIC PANEL, COMPREHENSIVE  -     CBC W/O DIFF  -     nitroglycerin (NITROBID) 2 % ointment; Apply 0.5 Inches to affected area every four (4) hours. Apply 1/2 inch to the chest when systolic blood pressure is > 170. Bilateral edema of lower extremity  -     AMB POC EKG ROUTINE W/ 12 LEADS, INTER & REP  -     furosemide (LASIX) 40 mg tablet;  Take 1 Tab by mouth daily.  -     METABOLIC PANEL, COMPREHENSIVE  -     CBC W/O DIFF        Follow-up Disposition:  Return in about 2 weeks (around 4/27/2017) for bp check and medication evaluation . I have reviewed patient medical and social history and medications. I have reviewed pertinent labs results and other data. I have discussed the diagnosis with the patient and the intended plan as seen in the above orders. The patient has received an after-visit summary and questions were answered concerning future plans. I have discussed medication side effects and warnings with the patient as well.     Jones Villarreal MD  Resident [facility]  04/17/17

## 2017-04-13 NOTE — PATIENT INSTRUCTIONS
CHANGES to MEDICATIONS    Plendil: Take 5 mg (once) daily   Lasix: Take 40mg (once) daily   Hydralazine qid Take 50mg daily four times a day , eat a small amount of food before bed.  Nitroglycerine paste: Apply 1/2 inch to the chest when systolic blood pressure is > 170. Wait 30 mins, take blood pressure, if orthostatic than remove 1/4 of the paste. Will see you in 2 weeks for your next visit    Continue to take home blood pressures, bring your blood pressure cuff next visit along with your entire log      Leg and Ankle Edema: Care Instructions  Your Care Instructions  Swelling in the legs, ankles, and feet is called edema. It is common after you sit or stand for a while. Long plane flights or car rides often cause swelling in the legs and feet. You may also have swelling if you have to stand for long periods of time at your job. Problems with the veins in the legs (varicose veins) and changes in hormones can also cause swelling. Sometimes the swelling in the ankles and feet is caused by a more serious problem, such as heart failure, infection, blood clots, or liver or kidney disease. Follow-up care is a key part of your treatment and safety. Be sure to make and go to all appointments, and call your doctor if you are having problems. Its also a good idea to know your test results and keep a list of the medicines you take. How can you care for yourself at home? · If your doctor gave you medicine, take it as prescribed. Call your doctor if you think you are having a problem with your medicine. · Whenever you are resting, raise your legs up. Try to keep the swollen area higher than the level of your heart. · Take breaks from standing or sitting in one position. ¨ Walk around to increase the blood flow in your lower legs. ¨ Move your feet and ankles often while you stand, or tighten and relax your leg muscles. · Wear support stockings. Put them on in the morning, before swelling gets worse.   · Eat a balanced diet. Lose weight if you need to. · Limit the amount of salt (sodium) in your diet. Salt holds fluid in the body and may increase swelling. When should you call for help? Call 911 anytime you think you may need emergency care. For example, call if:  · You have symptoms of a blood clot in your lung (called a pulmonary embolism). These may include:  ¨ Sudden chest pain. ¨ Trouble breathing. ¨ Coughing up blood. Call your doctor now or seek immediate medical care if:  · You have signs of a blood clot, such as:  ¨ Pain in your calf, back of the knee, thigh, or groin. ¨ Redness and swelling in your leg or groin. · You have symptoms of infection, such as:  ¨ Increased pain, swelling, warmth, or redness. ¨ Red streaks or pus. ¨ A fever. Watch closely for changes in your health, and be sure to contact your doctor if:  · Your swelling is getting worse. · You have new or worsening pain in your legs. · You do not get better as expected. Where can you learn more? Go to http://rafi-alejandro.info/. Enter B169 in the search box to learn more about \"Leg and Ankle Edema: Care Instructions. \"  Current as of: May 27, 2016  Content Version: 11.2  © 7522-0773 [employer], [employer]. Care instructions adapted under license by [employer] (which disclaims liability or warranty for this information). If you have questions about a medical condition or this instruction, always ask your healthcare professional. [place] [address] any warranty or liability for your use of this information.

## 2017-04-13 NOTE — TELEPHONE ENCOUNTER
Pts daughter Lavon Alves called and stated that pt has had swelling in her feet and ankles since the increase of Hydralazine.   Please advise

## 2017-04-13 NOTE — TELEPHONE ENCOUNTER
Called the pt and pt stated feet are still swollen but not as bad. Blood pressure sitting 155/63 pulse 60  Blood pressure standing 180/76 pulse 71. Pt used her machine. Does she need to come in for a B/P Check today?   Please advise

## 2017-04-13 NOTE — MR AVS SNAPSHOT
Visit Information Date & Time Provider Department Dept. Phone Encounter #  
 4/13/2017  3:10 PM Nelia Diaz MD [facility] [id number] Follow-up Instructions Return in about 2 weeks (around 4/27/2017) for bp check and medication evaluation . Your Appointments 4/21/2017  2:25 PM  
ROUTINE CARE with Genaro Wagner MD  
[address]) Appt Note: BP check 2005 A [facility] [address] [facility]   
[phone]  
  
   
 [facility] [address]  
  
    
 5/23/2017  9:50 AM  
ROUTINE CARE with Genaro Wagner MD  
[address]) Appt Note: 5 mo f/u-HTN (fasting) 2005 A [facility] [address] [address]  
[phone] Upcoming Health Maintenance Date Due  
 GLAUCOMA SCREENING Q2Y 6/2/2017 MEDICARE YEARLY EXAM 4/4/2018 DTaP/Tdap/Td series (2 - Td) 1/6/2027 Allergies as of 4/13/2017  Review Complete On: 4/13/2017 By: Jose Juan Severity Noted Reaction Type Reactions Ace Inhibitors  05/24/2010    Other (comments) Cough. Also has unclear reaction to ARB's  
  
Current Immunizations  Reviewed on 2/25/2016 Name Date H1N1 FLU VACCINE 2/24/2010 Influenza Vaccine 10/15/2015, 10/10/2014, 10/4/2013, 2/7/2013 Influenza Vaccine (Quad) PF 11/22/2016 Influenza Vaccine Split 10/14/2011, 10/6/2010 Influenza Vaccine Whole 12/6/2009 Pneumococcal Conjugate (PCV-13) 12/10/2015 Pneumococcal Vaccine (Unspecified Type) 10/6/2008, 9/8/2003 TD Vaccine 6/21/2006 Tdap 1/6/2017 Not reviewed this visit You Were Diagnosed With   
  
 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 
ICD-9-CM: 401.9 Vitals BP Pulse Temp Resp Height(growth percentile) Weight(growth percentile) 156/76 72 97.8 °F (36.6 °C) (Oral) 20 5' 3\" (1.6 m) 126 lb 3.2 oz (57.2 kg) SpO2 BMI OB Status Smoking Status 97% 22.36 kg/m2 Hysterectomy Never Smoker Vitals History BMI and BSA Data Body Mass Index Body Surface Area  
 22.36 kg/m 2 1.59 m 2 Preferred Pharmacy Pharmacy Name Phone [facility] [address] [facility], [facility] [address] [phone] Your Updated Medication List  
  
   
This list is accurate as of: 4/13/17  4:48 PM.  Always use your most recent med list.  
  
  
  
  
 albuterol 90 mcg/actuation inhaler Commonly known as:  PROVENTIL HFA, VENTOLIN HFA, PROAIR HFA Take 2 Puffs by inhalation every six (6) hours as needed for Wheezing (use her insurance's generic). aspirin delayed-release 81 mg tablet Take 81 mg by mouth daily. felodipine 5 mg 24 hr tablet Commonly known as:  PLENDIL SR Take 1 Tab by mouth daily. fexofenadine 180 mg tablet Commonly known as:  Pamelia Agusto Take 1 Tab by mouth daily. For allergies. FISH OIL 1,000 mg Cap Generic drug:  omega-3 fatty acids-vitamin e Take 1 Cap by mouth. furosemide 40 mg tablet Commonly known as:  LASIX Take 1 Tab by mouth daily. hydrALAZINE 50 mg tablet Commonly known as:  APRESOLINE Take 1 Tab by mouth four (4) times daily. Indications: hypertension  
  
 levothyroxine 25 mcg tablet Commonly known as:  SYNTHROID Take 1 Tab by mouth Daily (before breakfast). losartan 100 mg tablet Commonly known as:  COZAAR Take 1 Tab by mouth daily. nitroglycerin 2 % ointment Commonly known as:  NITROBID Apply 0.5 Inches to affected area every four (4) hours. simvastatin 40 mg tablet Commonly known as:  ZOCOR  
 Take 1 Tab by mouth nightly for 90 days. Indications: mixed hyperlipidemia VITAMIN B-12 1,000 mcg tablet Generic drug:  cyanocobalamin Take 1,000 mcg by mouth daily. VITAMIN D3 1,000 unit tablet Generic drug:  cholecalciferol Take 1,000 Units by mouth daily. Prescriptions Sent to Pharmacy Refills  
 felodipine (PLENDIL SR) 5 mg 24 hr tablet 1 Sig: Take 1 Tab by mouth daily. Class: Normal  
 Pharmacy: [facility] [place] [address] Ph #: [phone] Route: Oral  
 furosemide (LASIX) 40 mg tablet 1 Sig: Take 1 Tab by mouth daily. Class: Normal  
 Pharmacy: [address] [place] [address]. [address] [address] Ph #: [phone] Route: Oral  
 hydrALAZINE (APRESOLINE) 50 mg tablet 5 Sig: Take 1 Tab by mouth four (4) times daily. Indications: hypertension Class: Normal  
 Pharmacy: [address] [place] [address]. [address] [address] Ph #: [phone] Route: Oral  
 nitroglycerin (NITROBID) 2 % ointment 0 Sig: Apply 0.5 Inches to affected area every four (4) hours. Class: Normal  
 Pharmacy: [facility]. [address] [address] Ph #: [phone] Route: Topical  
  
We Performed the Following AMB POC EKG ROUTINE W/ 12 LEADS, INTER & REP [62622 CPT(R)] CBC W/O DIFF [87259 CPT(R)] METABOLIC PANEL, COMPREHENSIVE [80097 CPT(R)] Follow-up Instructions Return in about 2 weeks (around 4/27/2017) for bp check and medication evaluation . Patient Instructions Leg and Ankle Edema: Care Instructions Your Care Instructions Swelling in the legs, ankles, and feet is called edema. It is common after you sit or stand for a while. Long plane flights or car rides often cause swelling in the legs and feet. You may also have swelling if you have to stand for long periods of time at your job.  Problems with the veins in the legs (varicose veins) and changes in hormones can also cause swelling. Sometimes the swelling in the ankles and feet is caused by a more serious problem, such as heart failure, infection, blood clots, or liver or kidney disease. Follow-up care is a key part of your treatment and safety. Be sure to make and go to all appointments, and call your doctor if you are having problems. Its also a good idea to know your test results and keep a list of the medicines you take. How can you care for yourself at home? · If your doctor gave you medicine, take it as prescribed. Call your doctor if you think you are having a problem with your medicine. · Whenever you are resting, raise your legs up. Try to keep the swollen area higher than the level of your heart. · Take breaks from standing or sitting in one position. ¨ Walk around to increase the blood flow in your lower legs. ¨ Move your feet and ankles often while you stand, or tighten and relax your leg muscles. · Wear support stockings. Put them on in the morning, before swelling gets worse. · Eat a balanced diet. Lose weight if you need to. · Limit the amount of salt (sodium) in your diet. Salt holds fluid in the body and may increase swelling. When should you call for help? Call 911 anytime you think you may need emergency care. For example, call if: 
· You have symptoms of a blood clot in your lung (called a pulmonary embolism). These may include: 
¨ Sudden chest pain. ¨ Trouble breathing. ¨ Coughing up blood. Call your doctor now or seek immediate medical care if: 
· You have signs of a blood clot, such as: 
¨ Pain in your calf, back of the knee, thigh, or groin. ¨ Redness and swelling in your leg or groin. · You have symptoms of infection, such as: 
¨ Increased pain, swelling, warmth, or redness. ¨ Red streaks or pus. ¨ A fever. Watch closely for changes in your health, and be sure to contact your doctor if: 
· Your swelling is getting worse. · You have new or worsening pain in your legs. · You do not get better as expected. Where can you learn more? Go to http://rafi-alejandro.info/. Enter U756 in the search box to learn more about \"Leg and Ankle Edema: Care Instructions. \" Current as of: May 27, 2016 Content Version: 11.2 © 3664-2219 [employer]. Care instructions adapted under license by [employer] (which disclaims liability or warranty for this information). If you have questions about a medical condition or this instruction, always ask your healthcare professional. [address] any warranty or liability for your use of this information. Introducing [facility]! Wiley Clark introduces [employer] patient portal. Now you can access parts of your medical record, email your doctor's office, and request medication refills online. 1. In your internet browser, go to [email or web address] 2. Click on the First Time User? Click Here link in the Sign In box. You will see the New Member Sign Up page. 3. Enter your [employer] Access Code exactly as it appears below. You will not need to use this code after youve completed the sign-up process. If you do not sign up before the expiration date, you must request a new code. · [employer] Access Code: [email or web address] Expires: 6/22/2017  2:04 PM 
 
4. Enter the last four digits of your Social Security Number (xxxx) and Date of Birth (mm/dd/yyyy) as indicated and click Submit. You will be taken to the next sign-up page. 5. Create a [employer]Spacet ID. This will be your [employer] login ID and cannot be changed, so think of one that is secure and easy to remember. 6. Create a [employer] password. You can change your password at any time. 7. Enter your Password Reset Question and Answer. This can be used at a later time if you forget your password. 8. Enter your e-mail address.  You will receive e-mail notification when new information is available in [employer]. 9. Click Sign Up. You can now view and download portions of your medical record. 10. Click the Download Summary menu link to download a portable copy of your medical information. If you have questions, please visit the Frequently Asked Questions section of the [employer] website. Remember, [employer] is NOT to be used for urgent needs. For medical emergencies, dial 911. Now available from your iPhone and Android! Please provide this summary of care documentation to your next provider. Your primary care clinician is listed as [address]. If you have any questions after today's visit, please call [phone].

## 2017-04-14 LAB
ALBUMIN SERPL-MCNC: 4.3 G/DL (ref 3.2–4.6)
ALBUMIN/GLOB SERPL: 1.7 {RATIO} (ref 1.2–2.2)
ALP SERPL-CCNC: 71 IU/L (ref 39–117)
ALT SERPL-CCNC: 12 IU/L (ref 0–32)
AST SERPL-CCNC: 14 IU/L (ref 0–40)
BILIRUB SERPL-MCNC: 0.4 MG/DL (ref 0–1.2)
BUN SERPL-MCNC: 28 MG/DL (ref 10–36)
BUN/CREAT SERPL: 19 (ref 12–28)
CALCIUM SERPL-MCNC: 10.8 MG/DL (ref 8.7–10.3)
CHLORIDE SERPL-SCNC: 104 MMOL/L (ref 96–106)
CO2 SERPL-SCNC: 22 MMOL/L (ref 18–29)
CREAT SERPL-MCNC: 1.49 MG/DL (ref 0.57–1)
ERYTHROCYTE [DISTWIDTH] IN BLOOD BY AUTOMATED COUNT: 14.2 % (ref 12.3–15.4)
GLOBULIN SER CALC-MCNC: 2.6 G/DL (ref 1.5–4.5)
GLUCOSE SERPL-MCNC: 88 MG/DL (ref 65–99)
HCT VFR BLD AUTO: 34.1 % (ref 34–46.6)
HGB BLD-MCNC: 10.9 G/DL (ref 11.1–15.9)
INTERPRETATION: NORMAL
MCH RBC QN AUTO: 29.3 PG (ref 26.6–33)
MCHC RBC AUTO-ENTMCNC: 32 G/DL (ref 31.5–35.7)
MCV RBC AUTO: 92 FL (ref 79–97)
PLATELET # BLD AUTO: 198 X10E3/UL (ref 150–379)
POTASSIUM SERPL-SCNC: 4.5 MMOL/L (ref 3.5–5.2)
PROT SERPL-MCNC: 6.9 G/DL (ref 6–8.5)
RBC # BLD AUTO: 3.72 X10E6/UL (ref 3.77–5.28)
SODIUM SERPL-SCNC: 142 MMOL/L (ref 134–144)
WBC # BLD AUTO: 5.1 X10E3/UL (ref 3.4–10.8)

## 2017-04-19 NOTE — PROGRESS NOTES
I saw and evaluated the patient idependently, performing the key elements of the exam and service. I discussed the findings, assessment and plan with the resident and agree with the resident's findings and plan as documented in the resident's note. Nivia Ivey M.D.

## 2017-04-21 ENCOUNTER — OFFICE VISIT (OUTPATIENT)
Dept: FAMILY MEDICINE CLINIC | Age: 82
End: 2017-04-21

## 2017-04-21 VITALS
WEIGHT: 121.6 LBS | HEIGHT: 63 IN | RESPIRATION RATE: 20 BRPM | SYSTOLIC BLOOD PRESSURE: 136 MMHG | BODY MASS INDEX: 21.55 KG/M2 | DIASTOLIC BLOOD PRESSURE: 55 MMHG | HEART RATE: 75 BPM | OXYGEN SATURATION: 96 % | TEMPERATURE: 97.4 F

## 2017-04-21 DIAGNOSIS — I10 ESSENTIAL HYPERTENSION: Primary | Chronic | ICD-10-CM

## 2017-04-21 DIAGNOSIS — E03.4 HYPOTHYROIDISM DUE TO ACQUIRED ATROPHY OF THYROID: Chronic | ICD-10-CM

## 2017-04-21 DIAGNOSIS — E78.00 PURE HYPERCHOLESTEROLEMIA: Chronic | ICD-10-CM

## 2017-04-21 DIAGNOSIS — N18.30 CRI (CHRONIC RENAL INSUFFICIENCY), STAGE 3 (MODERATE) (HCC): Chronic | ICD-10-CM

## 2017-04-21 NOTE — PROGRESS NOTES
Chief Complaint   Patient presents with    Blood Pressure Check     Body mass index is 21.54 kg/(m^2).     Patients machine:  /59 HR 71    Our machine:  /55 HR 75    Reviewed record in preparation for visit and have necessary documentation  Pt did not bring medication to office visit for review  Information was given to pt on Advanced Directives, Living Will  Information was given on Shingles Vaccine  Opportunity was given for questions  Goals that were addressed and/or need to be completed after this appointment include:     Health Maintenance Due   Topic Date Due    GLAUCOMA SCREENING Q2Y  06/02/2017

## 2017-04-21 NOTE — PATIENT INSTRUCTIONS
Billy Simpson with [address]  [address] [place] [address],  [address] [place], [facility]  [phone]    Monitor blood pressure outside the office several times weekly at different times during the day and evening. Bring the record to me in 3 weeks for review.     Blood Pressure Record     Patient Name:  ______________________ :  ______________________    Date/Time BP Reading Pulse

## 2017-04-21 NOTE — MR AVS SNAPSHOT
Visit Information Date & Time Provider Department Dept. Phone Encounter #  
 4/21/2017  2:25 PM Kelechi Robb  [facility] [phone] [id number] Follow-up Instructions Return in about 4 months (around 8/21/2017) for fasting. Your Appointments 5/23/2017  9:50 AM  
ROUTINE CARE with Kelechi Robb MD  
[facility]) Appt Note: 5 mo f/u-HTN (fasting) [address] [address]  
[place] [address] [address] Upcoming Health Maintenance Date Due  
 GLAUCOMA SCREENING Q2Y 6/2/2017 MEDICARE YEARLY EXAM 4/4/2018 DTaP/Tdap/Td series (2 - Td) 1/6/2027 Allergies as of 4/21/2017  Review Complete On: 4/21/2017 By: Lance Foreman LPN Severity Noted Reaction Type Reactions Ace Inhibitors  05/24/2010    Other (comments) Cough. Also has unclear reaction to ARB's  
  
Current Immunizations  Reviewed on 2/25/2016 Name Date H1N1 FLU VACCINE 2/24/2010 Influenza Vaccine 10/15/2015, 10/10/2014, 10/4/2013, 2/7/2013 Influenza Vaccine (Quad) PF 11/22/2016 Influenza Vaccine Split 10/14/2011, 10/6/2010 Influenza Vaccine Whole 12/6/2009 Pneumococcal Conjugate (PCV-13) 12/10/2015 Pneumococcal Vaccine (Unspecified Type) 10/6/2008, 9/8/2003 TD Vaccine 6/21/2006 Tdap 1/6/2017 Not reviewed this visit You Were Diagnosed With   
  
 Codes Comments Essential hypertension    -  Primary ICD-10-CM: I10 
ICD-9-CM: 401.9 Pure hypercholesterolemia     ICD-10-CM: E78.00 ICD-9-CM: 272.0   
 CRI (chronic renal insufficiency), stage 3 (moderate)     ICD-10-CM: N18.3 ICD-9-CM: 333. 3 Vitals BP Pulse Temp Resp Height(growth percentile) Weight(growth percentile)  136/55 (BP 1 Location: Left arm, BP Patient Position: Sitting) 75 97.4 °F (36.3 °C) (Oral) 20 5' 3\" (1.6 m) 121 lb 9.6 oz (55.2 kg) SpO2 BMI OB Status Smoking Status 96% 21.54 kg/m2 Hysterectomy Never Smoker Vitals History BMI and BSA Data Body Mass Index Body Surface Area  
 21.54 kg/m 2 1.57 m 2 Preferred Pharmacy Pharmacy Name Phone [facility] [address] [phone] Your Updated Medication List  
  
   
This list is accurate as of: 4/21/17  2:52 PM.  Always use your most recent med list.  
  
  
  
  
 albuterol 90 mcg/actuation inhaler Commonly known as:  PROVENTIL HFA, VENTOLIN HFA, PROAIR HFA Take 2 Puffs by inhalation every six (6) hours as needed for Wheezing (use her insurance's generic). aspirin delayed-release 81 mg tablet Take 81 mg by mouth daily. felodipine 5 mg 24 hr tablet Commonly known as:  PLENDIL SR Take 1 Tab by mouth daily. fexofenadine 180 mg tablet Commonly known as:  Pamelia [place] Take 1 Tab by mouth daily. For allergies. FISH OIL 1,000 mg Cap Generic drug:  omega-3 fatty acids-vitamin e Take 1 Cap by mouth. furosemide 40 mg tablet Commonly known as:  LASIX Take 1 Tab by mouth daily. hydrALAZINE 50 mg tablet Commonly known as:  APRESOLINE Take 1 Tab by mouth four (4) times daily. Indications: hypertension  
  
 levothyroxine 25 mcg tablet Commonly known as:  SYNTHROID Take 1 Tab by mouth Daily (before breakfast). losartan 100 mg tablet Commonly known as:  COZAAR Take 1 Tab by mouth daily. nitroglycerin 2 % ointment Commonly known as:  NITROBID Apply 0.5 Inches to affected area every four (4) hours. Apply 1/2 inch to the chest when systolic blood pressure is > 170. simvastatin 40 mg tablet Commonly known as:  ZOCOR Take 1 Tab by mouth nightly for 90 days. Indications: mixed hyperlipidemia VITAMIN B-12 1,000 mcg tablet Generic drug:  cyanocobalamin Take 1,000 mcg by mouth daily. VITAMIN D3 1,000 unit tablet Generic drug:  cholecalciferol Take 1,000 Units by mouth daily. Follow-up Instructions Return in about 4 months (around 2017) for fasting. Patient Instructions [address] Affiliated with [address] [place] [address] Chun Rivas [address] [facility] 
[phone] Monitor blood pressure outside the office several times weekly at different times during the day and evening. Bring the record to me in 3 weeks for review. Blood Pressure Record Patient Name:  ______________________ :  ______________________ Date/Time BP Reading Pulse Introducing [facility]! [employer] introduces [employer] patient portal. Now you can access parts of your medical record, email your doctor's office, and request medication refills online. 1. In your internet browser, go to [email or web address] About Us 2. Click on the First Time User? Click Here link in the Sign In box. You will see the New Member Sign Up page. 3. Enter your [employer] Access Code exactly as it appears below. You will not need to use this code after youve completed the sign-up process. If you do not sign up before the expiration date, you must request a new code. · [employer] Access Code: [email or web address] Expires: 2017  2:04 PM 
 
4. Enter the last four digits of your Social Security Number (xxxx) and Date of Birth (mm/dd/yyyy) as indicated and click Submit. You will be taken to the next sign-up page. 5. Create a [employer] ID. This will be your [employer] login ID and cannot be changed, so think of one that is secure and easy to remember. 6. Create a [employer] password. You can change your password at any time. 7. Enter your Password Reset Question and Answer. This can be used at a later time if you forget your password. 8. Enter your e-mail address. You will receive e-mail notification when new information is available in [address] 9. Click Sign Up. You can now view and download portions of your medical record. 10. Click the Download Summary menu link to download a portable copy of your medical information. If you have questions, please visit the Frequently Asked Questions section of the [employer] website. Remember, [employer] is NOT to be used for urgent needs. For medical emergencies, dial 911. Now available from your iPhone and Android! Please provide this summary of care documentation to your next provider. Your primary care clinician is listed as [address]. If you have any questions after today's visit, please call [phone].

## 2017-04-21 NOTE — PROGRESS NOTES
Progress Note    Patient: Michael Antonio MRN: [id number]  SSN: [id number]    YOB: 1923  Age: 80 y.o. Sex: female        Chief Complaint   Patient presents with    Blood Pressure Check         Subjective:     Encounter Diagnoses   Name Primary?  Essential hypertension: When comparing cuffs her blood pressure cuff is 13 points higher than ours systolic. Her daughter will start to subtract this from her cuff rather than buy a new one. Even with the multiple medications recently may she feels good. BP Readings from Last 3 Encounters:   04/21/17 136/55   04/13/17 156/76   04/03/17 175/66     The patient reports:  taking medications as instructed, no medication side effects noted, no TIA's, no chest pain on exertion, no dyspnea on exertion, no swelling of ankles. Lab Results   Component Value Date/Time    Sodium 142 04/13/2017 04:36 PM    Potassium 4.5 04/13/2017 04:36 PM    Chloride 104 04/13/2017 04:36 PM    CO2 22 04/13/2017 04:36 PM    Anion gap 10 10/06/2010 10:34 AM    Glucose 88 04/13/2017 04:36 PM    BUN 28 04/13/2017 04:36 PM    Creatinine 1.49 04/13/2017 04:36 PM    BUN/Creatinine ratio 19 04/13/2017 04:36 PM    GFR est AA 35 04/13/2017 04:36 PM    GFR est non-AA 30 04/13/2017 04:36 PM    Calcium 10.8 04/13/2017 04:36 PM    Bilirubin, total 0.4 04/13/2017 04:36 PM    AST (SGOT) 14 04/13/2017 04:36 PM    Alk. phosphatase 71 04/13/2017 04:36 PM    Protein, total 6.9 04/13/2017 04:36 PM    Albumin 4.3 04/13/2017 04:36 PM    Globulin 3.2 10/06/2010 10:34 AM    A-G Ratio 1.7 04/13/2017 04:36 PM    ALT (SGPT) 12 04/13/2017 04:36 PM     Our goal is to normalize the blood pressure to decrease the risks of strokes and heart attacks. The patient is in agreement with the plan. Yes    Pure hypercholesterolemia:  Cardiovascular risks for her are: LDL goal is under 130  hypertension  hyperlipidemia.    Currently she takes Zocor (simvastatin) , 40 mg    Lab Results   Component Value Date/Time Cholesterol, total 199 11/22/2016 10:34 AM    HDL Cholesterol 69 11/22/2016 10:34 AM    LDL, calculated 109 11/22/2016 10:34 AM    Triglyceride 104 11/22/2016 10:34 AM    CHOL/HDL Ratio 3.3 10/06/2010 10:34 AM     Lab Results   Component Value Date/Time    ALT (SGPT) 12 04/13/2017 04:36 PM    AST (SGOT) 14 04/13/2017 04:36 PM    Alk. phosphatase 71 04/13/2017 04:36 PM    Bilirubin, total 0.4 04/13/2017 04:36 PM      Myalgias: No   Fatigue: No   Other side effects: no  Wt Readings from Last 3 Encounters:   04/21/17 121 lb 9.6 oz (55.2 kg)   04/13/17 126 lb 3.2 oz (57.2 kg)   04/03/17 124 lb (56.2 kg)     The patient is aware of our goal to reduce or eliminate the long term problems (such as strokes and heart attacks) related to poorly controlled hyperlipidemia.  CRI (chronic renal insufficiency), stage 3 (moderate):  Nephrologist:   Lab Results   Component Value Date/Time    GFR est AA 35 04/13/2017 04:36 PM    GFR est non-AA 30 04/13/2017 04:36 PM    Creatinine 1.49 04/13/2017 04:36 PM    BUN 28 04/13/2017 04:36 PM    Sodium 142 04/13/2017 04:36 PM    Potassium 4.5 04/13/2017 04:36 PM    Chloride 104 04/13/2017 04:36 PM    CO2 22 04/13/2017 04:36 PM            Hypothyroidism due to acquired atrophy of thyroid:  Lab Results   Component Value Date/Time    TSH 3.090 11/22/2016 10:34 AM    T4, Free 1.30 11/22/2016 10:34 AM      Denies fatigue, nervousness,weight changes, heat orcold intolerance, bowel changes,skin changes, cardiovascular symptoms, hair loss, feeling excessive energy, tremor, palpitations and weight loss. Thyroid medication has been unchanged since last medication check and labs. Current and past medical information:    Current Medications after this visit[de-identified]   Current Outpatient Prescriptions   Medication Sig    felodipine (PLENDIL SR) 5 mg 24 hr tablet Take 1 Tab by mouth daily.  furosemide (LASIX) 40 mg tablet Take 1 Tab by mouth daily.     hydrALAZINE (APRESOLINE) 50 mg tablet Take 1 Tab by mouth four (4) times daily. Indications: hypertension    albuterol (PROVENTIL HFA, VENTOLIN HFA, PROAIR HFA) 90 mcg/actuation inhaler Take 2 Puffs by inhalation every six (6) hours as needed for Wheezing (use her insurance's generic).  levothyroxine (SYNTHROID) 25 mcg tablet Take 1 Tab by mouth Daily (before breakfast).  losartan (COZAAR) 100 mg tablet Take 1 Tab by mouth daily.  simvastatin (ZOCOR) 40 mg tablet Take 1 Tab by mouth nightly for 90 days. Indications: mixed hyperlipidemia    fexofenadine (ALLEGRA) 180 mg tablet Take 1 Tab by mouth daily. For allergies.  omega-3 fatty acids-vitamin e (FISH OIL) 1,000 mg Cap Take 1 Cap by mouth.  aspirin delayed-release 81 mg tablet Take 81 mg by mouth daily.  cholecalciferol, vitamin d3, (VITAMIN D) 1,000 unit tablet Take 1,000 Units by mouth daily.  cyanocobalamin (VITAMIN B-12) 1,000 mcg tablet Take 1,000 mcg by mouth daily.  nitroglycerin (NITROBID) 2 % ointment Apply 0.5 Inches to affected area every four (4) hours. Apply 1/2 inch to the chest when systolic blood pressure is > 170. No current facility-administered medications for this visit. Patient Active Problem List    Diagnosis Date Noted    Hypertension 05/24/2010     Priority: 1 - One    Hyperlipidemia 05/24/2010     Priority: 1 - One    Arthritis 05/24/2010     Priority: 1 - One    Hx-TIA (transient ischemic attack) 05/24/2010     Priority: 1 - One    Migraine 05/24/2010     Priority: 1 - One    S/P hysterectomy 10/10/2014    Hypothyroidism 08/05/2013    CRI (chronic renal insufficiency) 02/08/2011       Past Medical History:   Diagnosis Date    Arthritis 5/24/2010    Hx-TIA (transient ischemic attack) 5/24/2010    Hyperlipidemia 5/24/2010    Hypertension 5/24/2010    Migraine 5/24/2010       Allergies   Allergen Reactions    Ace Inhibitors Other (comments)     Cough.  Also has unclear reaction to ARB's       Past Surgical History:   Procedure Laterality Date    HX GYN      hysterectomy,btl    HX GYN  9/6/11    complete hysterectomy    HX HEENT  10/10. bilateral cataract removal.       Social History     Social History    Marital status:      Spouse name: N/A    Number of children: N/A    Years of education: N/A     Social History Main Topics    Smoking status: Never Smoker    Smokeless tobacco: Never Used    Alcohol use No    Drug use: No    Sexual activity: Not Asked     Other Topics Concern    None     Social History Narrative       Review of Systems   Constitutional: Negative. Negative for chills, fever, malaise/fatigue and weight loss. HENT: Negative. Negative for hearing loss. Eyes: Negative. Negative for blurred vision and double vision. Respiratory: Negative. Negative for cough, hemoptysis, sputum production and shortness of breath. Cardiovascular: Negative. Negative for chest pain, palpitations and orthopnea. Gastrointestinal: Negative. Negative for abdominal pain, blood in stool, heartburn, nausea and vomiting. Genitourinary: Negative. Negative for dysuria, frequency and urgency. Musculoskeletal: Negative. Negative for back pain, myalgias and neck pain. Skin: Negative. Negative for rash. Neurological: Negative. Negative for dizziness, tingling, tremors, weakness and headaches. Endo/Heme/Allergies: Negative. Psychiatric/Behavioral: Negative. Negative for depression. Objective:     Vitals:    04/21/17 1432   BP: 136/55   Pulse: 75   Resp: 20   Temp: 97.4 °F (36.3 °C)   TempSrc: Oral   SpO2: 96%   Weight: 121 lb 9.6 oz (55.2 kg)   Height: 5' 3\" (1.6 m)      Body mass index is 21.54 kg/(m^2). Physical Exam   Constitutional: She is oriented to person, place, and time and well-developed, well-nourished, and in no distress. No distress. HENT:   Head: Normocephalic and atraumatic.    Mouth/Throat: Oropharynx is clear and moist.   Eyes: Conjunctivae are normal.   Neck: No tracheal deviation present. No thyromegaly present. Cardiovascular: Normal rate, regular rhythm and normal heart sounds. No murmur heard. Pulmonary/Chest: Effort normal and breath sounds normal. No respiratory distress. Abdominal: Soft. She exhibits no distension. Lymphadenopathy:     She has no cervical adenopathy. Neurological: She is alert and oriented to person, place, and time. Skin: Skin is warm. No rash noted. She is not diaphoretic. No erythema. Psychiatric: Mood and affect normal.   Nursing note and vitals reviewed. Health Maintenance Due   Topic Date Due    GLAUCOMA SCREENING Q2Y  06/02/2017         Assessment and orders:       ICD-10-CM ICD-9-CM    1. Essential hypertension-controlled. Continue current medications     I10 401.9    2. Pure hypercholesterolemia  -controlled     E78.00 272.0    3. CRI (chronic renal insufficiency), stage 3 (moderate)-we will repeat in 4 months     N18.3 585.3    4. Hypothyroidism due to acquired atrophy of thyroid-stable  E03.4 244.8      246.8          Plan of care:  Discussed diagnoses in detail with patient. Medication risks/benefits/side effects discussed with patient. All of the patient's questions were addressed. The patient understands and agrees with our plan of care. The patient knows to call back if they are unsure of or forget any changes we discussed today or if the symptoms change. The patient received an After-Visit Summary which contains VS, orders, medication list and allergy list. This can be used as a \"mini-medical record\" should they have to seek medical care while out of town. Patient Care Team:  Gamaliel Hameed MD as PCP - General    Follow-up Disposition:  Return in about 4 months (around 8/21/2017) for fasting.     Future Appointments  Date Time Provider Tiago De Oliveira   8/21/2017 10:10 AM Gamaliel Hameed MD [facility] SCHED       Signed By: Gamaliel Hameed MD     April 21, 2017

## 2017-04-26 RX ORDER — LEVOTHYROXINE SODIUM 25 UG/1
25 TABLET ORAL
Qty: 90 TAB | Refills: 3 | Status: SHIPPED | OUTPATIENT
Start: 2017-04-26 | End: 2018-07-11 | Stop reason: SDUPTHER

## 2017-05-05 ENCOUNTER — TELEPHONE (OUTPATIENT)
Dept: FAMILY MEDICINE CLINIC | Age: 82
End: 2017-05-05

## 2017-05-05 DIAGNOSIS — I13.10 MALIGNANT HTN WITH HEART DISEASE, W/O CHF, WITH CHRONIC KIDNEY DISEASE: ICD-10-CM

## 2017-05-05 NOTE — TELEPHONE ENCOUNTER
Patient has been scheduled for a Carotid Duplex on May 11, 2017 at 2:00 with an arrival of 1:30. Order faxed to [facility]. Patient informed by phone.

## 2017-05-26 ENCOUNTER — OFFICE VISIT (OUTPATIENT)
Dept: FAMILY MEDICINE CLINIC | Age: 82
End: 2017-05-26

## 2017-05-26 VITALS
HEIGHT: 63 IN | WEIGHT: 120.8 LBS | RESPIRATION RATE: 20 BRPM | BODY MASS INDEX: 21.4 KG/M2 | TEMPERATURE: 97.9 F | OXYGEN SATURATION: 98 % | HEART RATE: 63 BPM | SYSTOLIC BLOOD PRESSURE: 117 MMHG | DIASTOLIC BLOOD PRESSURE: 56 MMHG

## 2017-05-26 DIAGNOSIS — R60.0 BILATERAL EDEMA OF LOWER EXTREMITY: ICD-10-CM

## 2017-05-26 DIAGNOSIS — E78.00 PURE HYPERCHOLESTEROLEMIA: Primary | Chronic | ICD-10-CM

## 2017-05-26 DIAGNOSIS — I13.10 MALIGNANT HTN WITH HEART DISEASE, W/O CHF, WITH CHRONIC KIDNEY DISEASE: ICD-10-CM

## 2017-05-26 DIAGNOSIS — I77.1 SUBCLAVIAN ARTERY STENOSIS, RIGHT (HCC): ICD-10-CM

## 2017-05-26 RX ORDER — FUROSEMIDE 40 MG/1
40 TABLET ORAL DAILY
Qty: 30 TAB | Refills: 1 | Status: SHIPPED | OUTPATIENT
Start: 2017-05-26 | End: 2017-06-22 | Stop reason: SDUPTHER

## 2017-05-26 RX ORDER — FELODIPINE 5 MG/1
5 TABLET, EXTENDED RELEASE ORAL DAILY
Qty: 30 TAB | Refills: 1 | Status: SHIPPED | OUTPATIENT
Start: 2017-05-26 | End: 2017-06-22 | Stop reason: SDUPTHER

## 2017-05-26 NOTE — PROGRESS NOTES
Brissa Chou is an 80 y.o. female who presents with chief concern of results discussion, Carotid artery stenosis. Carotid have continued to be evaluated s/p multiple TIA. Results discussed in detail with patient and patient daughter. Study performed on 5/11/17 and ready by Dr. Burt Negro in [facility].  R and L ICA with 1-49% stenosis. R SC with mild plaque at proximal segment and  cm/s  L SC normal.  Left vertebral could not exclude occlusion. Right vertebral normal.      Patient is subjectively doing well. Been a little tired today but not uncommon. No chest pain, palpitations, SOB. No HA, Vs Changes, weakness, or signs of stroke or TIA. HTN  She is in need of medication refill. Her BP has been labile over the past few months. She has recordings of 120/70s. BP Readings from Last 3 Encounters:   05/26/17 117/56   04/21/17 136/55   04/13/17 156/76       Current and past medical information:  I personally reviewed and included updated list below. Past Medical History:   Diagnosis Date    Arthritis 5/24/2010    Hx-TIA (transient ischemic attack) 5/24/2010    Hyperlipidemia 5/24/2010    Hypertension 5/24/2010    Migraine 5/24/2010       Allergies   Allergen Reactions    Ace Inhibitors Other (comments)     Cough. Also has unclear reaction to ARB's       Past Surgical History:   Procedure Laterality Date    HX GYN      hysterectomy,btl    HX GYN  9/6/11    complete hysterectomy    HX HEENT  10/10.     bilateral cataract removal.       Social History     Social History    Marital status:      Spouse name: N/A    Number of children: N/A    Years of education: N/A     Social History Main Topics    Smoking status: Never Smoker    Smokeless tobacco: Never Used    Alcohol use No    Drug use: No    Sexual activity: Not Asked     Other Topics Concern    None     Social History Narrative           Physical Exam, Abnormal/pertinent findings bolded,     Visit Vitals    /56    Pulse 63    Temp 97.9 °F (36.6 °C) (Oral)    Resp 20    Ht 5' 3\" (1.6 m)    Wt 120 lb 12.8 oz (54.8 kg)    SpO2 98%    BMI 21.4 kg/m2          GEN:    Extremely pleasant, Alert and Oriented, No acute distress  Psych:  Mood appropriate, No pressured speech, linear thoughts  CV:    Regular Rhythm, S1 and S2 audible, no MRG, no palpable thrills  Pulm:    CTA B/L, no wheezes/rubs  GI/Abd:   Non-tender to palpation, normal bowel sounds x4, no masses,  Skin:   No visible Rash, Ecchymosis, or Excoriations  Lower Ext: No edema, No tenderness to palpation, No palpable cords        Assessment/PLAN    1. Subclavian artery stenosis, right (HCC)  - Stable. Discussed results with patient of her recent carotid U/S with patient and patient daughter. Results personally reviewed, summarized and to be scanned to the chart. 2. 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 Reviewed list of her recorded BPs, to be scanned to chart.   - felodipine (PLENDIL SR) 5 mg 24 hr tablet; Take 1 Tab by mouth daily. Dispense: 30 Tab; Refill: 1  - furosemide (LASIX) 40 mg tablet; Take 1 Tab by mouth daily. Dispense: 30 Tab; Refill: 1    3. Bilateral edema of lower extremity  - No edema today. Continue current medications. Consider stopping lasix for summer time and switch to PRN for her age. - furosemide (LASIX) 40 mg tablet; Take 1 Tab by mouth daily. Dispense: 30 Tab; Refill: 1    4. Pure hypercholesterolemia  - Stable. Unclear benefit of statin at 80years old but she is in wonderful shape and would like to continue the medication.    Lab Results   Component Value Date/Time    Cholesterol, total 199 11/22/2016 10:34 AM    HDL Cholesterol 69 11/22/2016 10:34 AM    LDL, calculated 109 11/22/2016 10:34 AM    VLDL, calculated 21 11/22/2016 10:34 AM    Triglyceride 104 11/22/2016 10:34 AM    CHOL/HDL Ratio 3.3 10/06/2010 10:34 AM        Follow-up Disposition:  Return in about 3 months (around 8/26/2017). For next visit follow up routine care, consider weight gain and strength training. Plan of care:  Discussed diagnoses in detail with patient. Medication risks/benefits/side effects discussed with patient. All of the patient's questions were addressed. The patient understands and agrees with our plan of care. The patient knows to call back if they are unsure of or forget any changes we discussed today or if the symptoms change. The patient received an After-Visit Summary which contains VS, orders, medication list and allergy list. This can be used as a \"mini-medical record\" should they have to seek medical care while out of town. Matias Stern DO  Resident note, PGY-3  Patient discussed with [address] Physician, Dr. Randolph Davies  Date Time Provider Tiago De Oliveira   8/21/2017 10:10 AM [facility]           Current Medications after this visit[de-identified]       Current Outpatient Prescriptions   Medication Sig    felodipine (PLENDIL SR) 5 mg 24 hr tablet Take 1 Tab by mouth daily.  furosemide (LASIX) 40 mg tablet Take 1 Tab by mouth daily.  levothyroxine (SYNTHROID) 25 mcg tablet Take 1 Tab by mouth Daily (before breakfast).  hydrALAZINE (APRESOLINE) 50 mg tablet Take 1 Tab by mouth four (4) times daily. Indications: hypertension    nitroglycerin (NITROBID) 2 % ointment Apply 0.5 Inches to affected area every four (4) hours. Apply 1/2 inch to the chest when systolic blood pressure is > 170.  albuterol (PROVENTIL HFA, VENTOLIN HFA, PROAIR HFA) 90 mcg/actuation inhaler Take 2 Puffs by inhalation every six (6) hours as needed for Wheezing (use her insurance's generic).  losartan (COZAAR) 100 mg tablet Take 1 Tab by mouth daily.  fexofenadine (ALLEGRA) 180 mg tablet Take 1 Tab by mouth daily. For allergies.  omega-3 fatty acids-vitamin e (FISH OIL) 1,000 mg Cap Take 1 Cap by mouth.       aspirin delayed-release 81 mg tablet Take 81 mg by mouth daily.  cholecalciferol, vitamin d3, (VITAMIN D) 1,000 unit tablet Take 1,000 Units by mouth daily.  cyanocobalamin (VITAMIN B-12) 1,000 mcg tablet Take 1,000 mcg by mouth daily.  simvastatin (ZOCOR) 40 mg tablet Take 1 Tab by mouth nightly for 90 days. Indications: mixed hyperlipidemia     No current facility-administered medications for this visit.

## 2017-05-26 NOTE — PATIENT INSTRUCTIONS

## 2017-05-26 NOTE — MR AVS SNAPSHOT
Visit Information Date & Time Provider Department Dept. Phone Encounter #  
 5/26/2017  3:30 PM Princess Damon, [address] [facility] [phone] [id number] Your Appointments 8/21/2017 10:10 AM  
ROUTINE CARE with Sylvie Thomas MD  
[address] [facility] [address] [facility]) Appt Note: 5 mo f/u-HTN (fasting); 4 mo f/u-HTN (fasting) [address] [address]  
[place] [address] [address] Upcoming Health Maintenance Date Due INFLUENZA AGE 9 TO ADULT 8/1/2017 MEDICARE YEARLY EXAM 4/4/2018 GLAUCOMA SCREENING Q2Y 4/28/2019 DTaP/Tdap/Td series (2 - Td) 1/6/2027 Allergies as of 5/26/2017  Review Complete On: 5/26/2017 By: Mina Martel Severity Noted Reaction Type Reactions Ace Inhibitors  05/24/2010    Other (comments) Cough. Also has unclear reaction to ARB's  
  
Current Immunizations  Reviewed on 2/25/2016 Name Date H1N1 FLU VACCINE 2/24/2010 Influenza Vaccine 10/15/2015, 10/10/2014, 10/4/2013, 2/7/2013 Influenza Vaccine (Quad) PF 11/22/2016 Influenza Vaccine Split 10/14/2011, 10/6/2010 Influenza Vaccine Whole 12/6/2009 Pneumococcal Conjugate (PCV-13) 12/10/2015 Pneumococcal Vaccine (Unspecified Type) 10/6/2008, 9/8/2003 TD Vaccine 6/21/2006 Tdap 1/6/2017 Not reviewed this visit You Were Diagnosed With   
  
 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 3 Vitals BP Pulse Temp Resp Height(growth percentile) Weight(growth percentile) 117/56 63 97.9 °F (36.6 °C) (Oral) 20 5' 3\" (1.6 m) 120 lb 12.8 oz (54.8 kg) SpO2 BMI OB Status Smoking Status 98% 21.4 kg/m2 Hysterectomy Never Smoker Vitals History BMI and BSA Data Body Mass Index Body Surface Area  
 21.4 kg/m 2 1.56 m 2 Preferred Pharmacy Pharmacy Name Phone [facility] 79 [phone] Your Updated Medication List  
  
   
This list is accurate as of: 5/26/17  4:05 PM.  Always use your most recent med list.  
  
  
  
  
 albuterol 90 mcg/actuation inhaler Commonly known as:  PROVENTIL HFA, VENTOLIN HFA, PROAIR HFA Take 2 Puffs by inhalation every six (6) hours as needed for Wheezing (use her insurance's generic). aspirin delayed-release 81 mg tablet Take 81 mg by mouth daily. felodipine 5 mg 24 hr tablet Commonly known as:  PLENDIL SR Take 1 Tab by mouth daily. fexofenadine 180 mg tablet Commonly known as:  Kolleen Memory Take 1 Tab by mouth daily. For allergies. FISH OIL 1,000 mg Cap Generic drug:  omega-3 fatty acids-vitamin e Take 1 Cap by mouth. furosemide 40 mg tablet Commonly known as:  LASIX Take 1 Tab by mouth daily. hydrALAZINE 50 mg tablet Commonly known as:  APRESOLINE Take 1 Tab by mouth four (4) times daily. Indications: hypertension  
  
 levothyroxine 25 mcg tablet Commonly known as:  SYNTHROID Take 1 Tab by mouth Daily (before breakfast). losartan 100 mg tablet Commonly known as:  COZAAR Take 1 Tab by mouth daily. nitroglycerin 2 % ointment Commonly known as:  NITROBID Apply 0.5 Inches to affected area every four (4) hours. Apply 1/2 inch to the chest when systolic blood pressure is > 170. simvastatin 40 mg tablet Commonly known as:  ZOCOR Take 1 Tab by mouth nightly for 90 days. Indications: mixed hyperlipidemia VITAMIN B-12 1,000 mcg tablet Generic drug:  cyanocobalamin Take 1,000 mcg by mouth daily. VITAMIN D3 1,000 unit tablet Generic drug:  cholecalciferol Take 1,000 Units by mouth daily. Prescriptions Sent to Pharmacy Refills  
 felodipine (PLENDIL SR) 5 mg 24 hr tablet 1 Sig: Take 1 Tab by mouth daily. Class: Normal  
 Pharmacy: [place] [address] Ph #: [phone] Route: Oral  
 furosemide (LASIX) 40 mg tablet 1 Sig: Take 1 Tab by mouth daily. Class: Normal  
 Pharmacy: [place] [address] Ph #: [phone] Route: Oral  
  
Patient Instructions A Healthy Lifestyle: Care Instructions Your Care Instructions A healthy lifestyle can help you feel good, stay at a healthy weight, and have plenty of energy for both work and play. A healthy lifestyle is something you can share with your whole family. A healthy lifestyle also can lower your risk for serious health problems, such as high blood pressure, heart disease, and diabetes. You can follow a few steps listed below to improve your health and the health of your family. Follow-up care is a key part of your treatment and safety. Be sure to make and go to all appointments, and call your doctor if you are having problems. Its also a good idea to know your test results and keep a list of the medicines you take. How can you care for yourself at home? · Do not eat too much sugar, fat, or fast foods. You can still have dessert and treats now and then. The goal is moderation. · Start small to improve your eating habits. Pay attention to portion sizes, drink less juice and soda pop, and eat more fruits and vegetables. ¨ Eat a healthy amount of food. A 3-ounce serving of meat, for example, is about the size of a deck of cards. Fill the rest of your plate with vegetables and whole grains. ¨ Limit the amount of soda and sports drinks you have every day. Drink more water when you are thirsty. ¨ Eat at least 5 servings of fruits and vegetables every day. It may seem like a lot, but it is not hard to reach this goal. A serving or helping is 1 piece of fruit, 1 cup of vegetables, or 2 cups of leafy, raw vegetables. Have an apple or some carrot sticks as an afternoon snack instead of a candy bar. Try to have fruits and/or vegetables at every meal. 
· Make exercise part of your daily routine. You may want to start with simple activities, such as walking, bicycling, or slow swimming. Try to be active 30 to 60 minutes every day. You do not need to do all 30 to 60 minutes all at once. For example, you can exercise 3 times a day for 10 or 20 minutes. Moderate exercise is safe for most people, but it is always a good idea to talk to your doctor before starting an exercise program. 
· Keep moving. Carolyn Yousif the lawn, work in the garden, or [employer]. Take the stairs instead of the elevator at work. · If you smoke, quit. People who smoke have an increased risk for heart attack, stroke, cancer, and other lung illnesses. Quitting is hard, but there are ways to boost your chance of quitting tobacco for good. ¨ Use nicotine gum, patches, or lozenges. ¨ Ask your doctor about stop-smoking programs and medicines. ¨ Keep trying. In addition to reducing your risk of diseases in the future, you will notice some benefits soon after you stop using tobacco. If you have shortness of breath or asthma symptoms, they will likely get better within a few weeks after you quit. · Limit how much alcohol you drink. Moderate amounts of alcohol (up to 2 drinks a day for men, 1 drink a day for women) are okay. But drinking too much can lead to liver problems, high blood pressure, and other health problems. Family health If you have a family, there are many things you can do together to improve your health. · Eat meals together as a family as often as possible. · Eat healthy foods.  This includes fruits, vegetables, lean meats and dairy, and whole grains. · Include your family in your fitness plan. Most people think of activities such as jogging or tennis as the way to fitness, but there are many ways you and your family can be more active. Anything that makes you breathe hard and gets your heart pumping is exercise. Here are some tips: 
¨ Walk to do errands or to take your child to school or the bus. ¨ Go for a family bike ride after dinner instead of watching TV. Where can you learn more? Go to http://rafi-alejandro.info/. Enter W616 in the search box to learn more about \"A Healthy Lifestyle: Care Instructions. \" Current as of: July 26, 2016 Content Version: 11.2 © 5828-1142 [employer]igoo. Care instructions adapted under license by [employer] (which disclaims liability or warranty for this information). If you have questions about a medical condition or this instruction, always ask your healthcare professional. [place] [address] any warranty or liability for your use of this information. Introducing [facility]! Tosin Harris introduces Apttus patient portal. Now you can access parts of your medical record, email your doctor's office, and request medication refills online. 1. In your internet browser, go to [email or web address] 2. Click on the First Time User? Click Here link in the Sign In box. You will see the New Member Sign Up page. 3. Enter your Apttus Access Code exactly as it appears below. You will not need to use this code after youve completed the sign-up process. If you do not sign up before the expiration date, you must request a new code. · Apttus Access Code: [email or web address] Expires: 6/22/2017  2:04 PM 
 
4. Enter the last four digits of your Social Security Number (xxxx) and Date of Birth (mm/dd/yyyy) as indicated and click Submit. You will be taken to the next sign-up page. 5. Create a [email or web address] ID. This will be your [email or web address] login ID and cannot be changed, so think of one that is secure and easy to remember. 6. Create a [email or web address] password. You can change your password at any time. 7. Enter your Password Reset Question and Answer. This can be used at a later time if you forget your password. 8. Enter your e-mail address. You will receive e-mail notification when new information is available in [address] 9. Click Sign Up. You can now view and download portions of your medical record. 10. Click the Download Summary menu link to download a portable copy of your medical information. If you have questions, please visit the Frequently Asked Questions section of the [email or web address] website. Remember, [email or web address] is NOT to be used for urgent needs. For medical emergencies, dial 911. Now available from your iPhone and Android! Please provide this summary of care documentation to your next provider. Your primary care clinician is listed as [address]. If you have any questions after today's visit, please call [phone].

## 2017-05-28 NOTE — PROGRESS NOTES
I saw and evaluated the patient idependently, performing the key elements of the exam and service. I discussed the findings, assessment and plan with the resident and agree with the resident's findings and plan as documented in the resident's note. Tj Yanes M.D.

## 2017-06-22 DIAGNOSIS — I13.10 MALIGNANT HTN WITH HEART DISEASE, W/O CHF, WITH CHRONIC KIDNEY DISEASE: ICD-10-CM

## 2017-06-22 DIAGNOSIS — R60.0 BILATERAL EDEMA OF LOWER EXTREMITY: ICD-10-CM

## 2017-06-22 DIAGNOSIS — I10 ESSENTIAL HYPERTENSION WITH GOAL BLOOD PRESSURE LESS THAN 140/90: ICD-10-CM

## 2017-06-22 DIAGNOSIS — E78.2 MIXED HYPERLIPIDEMIA: ICD-10-CM

## 2017-06-26 RX ORDER — LOSARTAN POTASSIUM 100 MG/1
100 TABLET ORAL DAILY
Qty: 90 TAB | Refills: 1 | Status: SHIPPED | OUTPATIENT
Start: 2017-06-26 | End: 2017-08-21 | Stop reason: SDUPTHER

## 2017-06-26 RX ORDER — FELODIPINE 5 MG/1
5 TABLET, EXTENDED RELEASE ORAL DAILY
Qty: 30 TAB | Refills: 1 | Status: SHIPPED | OUTPATIENT
Start: 2017-06-26 | End: 2017-07-18 | Stop reason: SDUPTHER

## 2017-06-26 RX ORDER — FUROSEMIDE 40 MG/1
40 TABLET ORAL DAILY
Qty: 30 TAB | Refills: 1 | Status: SHIPPED | OUTPATIENT
Start: 2017-06-26 | End: 2017-07-18 | Stop reason: SDUPTHER

## 2017-06-26 RX ORDER — SIMVASTATIN 40 MG/1
40 TABLET, FILM COATED ORAL
Qty: 90 TAB | Refills: 2 | Status: SHIPPED | OUTPATIENT
Start: 2017-06-26 | End: 2017-08-21 | Stop reason: SDUPTHER

## 2017-07-18 DIAGNOSIS — I13.10 MALIGNANT HTN WITH HEART DISEASE, W/O CHF, WITH CHRONIC KIDNEY DISEASE: ICD-10-CM

## 2017-07-18 DIAGNOSIS — R60.0 BILATERAL EDEMA OF LOWER EXTREMITY: ICD-10-CM

## 2017-07-18 RX ORDER — FUROSEMIDE 40 MG/1
40 TABLET ORAL DAILY
Qty: 30 TAB | Refills: 1 | Status: SHIPPED | OUTPATIENT
Start: 2017-07-18 | End: 2017-08-21 | Stop reason: SDUPTHER

## 2017-07-18 RX ORDER — FELODIPINE 5 MG/1
5 TABLET, EXTENDED RELEASE ORAL DAILY
Qty: 30 TAB | Refills: 1 | Status: SHIPPED | OUTPATIENT
Start: 2017-07-18 | End: 2017-08-21 | Stop reason: SDUPTHER

## 2017-08-21 ENCOUNTER — OFFICE VISIT (OUTPATIENT)
Dept: FAMILY MEDICINE CLINIC | Age: 82
End: 2017-08-21

## 2017-08-21 VITALS
OXYGEN SATURATION: 99 % | HEIGHT: 63 IN | RESPIRATION RATE: 16 BRPM | SYSTOLIC BLOOD PRESSURE: 122 MMHG | TEMPERATURE: 98.2 F | BODY MASS INDEX: 21.26 KG/M2 | DIASTOLIC BLOOD PRESSURE: 47 MMHG | HEART RATE: 52 BPM | WEIGHT: 120 LBS

## 2017-08-21 DIAGNOSIS — I10 ESSENTIAL HYPERTENSION WITH GOAL BLOOD PRESSURE LESS THAN 140/90: ICD-10-CM

## 2017-08-21 DIAGNOSIS — R60.0 BILATERAL EDEMA OF LOWER EXTREMITY: ICD-10-CM

## 2017-08-21 DIAGNOSIS — E03.4 HYPOTHYROIDISM DUE TO ACQUIRED ATROPHY OF THYROID: Chronic | ICD-10-CM

## 2017-08-21 DIAGNOSIS — N18.30 CRI (CHRONIC RENAL INSUFFICIENCY), STAGE 3 (MODERATE) (HCC): Chronic | ICD-10-CM

## 2017-08-21 DIAGNOSIS — I13.10 MALIGNANT HTN WITH HEART DISEASE, W/O CHF, WITH CHRONIC KIDNEY DISEASE: ICD-10-CM

## 2017-08-21 DIAGNOSIS — E78.2 MIXED HYPERLIPIDEMIA: ICD-10-CM

## 2017-08-21 DIAGNOSIS — I10 ESSENTIAL HYPERTENSION: Primary | Chronic | ICD-10-CM

## 2017-08-21 RX ORDER — LOSARTAN POTASSIUM 100 MG/1
100 TABLET ORAL DAILY
Qty: 90 TAB | Refills: 2 | Status: SHIPPED | OUTPATIENT
Start: 2017-08-21 | End: 2018-06-12 | Stop reason: SDUPTHER

## 2017-08-21 RX ORDER — FELODIPINE 5 MG/1
5 TABLET, EXTENDED RELEASE ORAL DAILY
Qty: 90 TAB | Refills: 2 | Status: SHIPPED | OUTPATIENT
Start: 2017-08-21 | End: 2017-09-11 | Stop reason: SDUPTHER

## 2017-08-21 RX ORDER — HYDRALAZINE HYDROCHLORIDE 50 MG/1
50 TABLET, FILM COATED ORAL 4 TIMES DAILY
Qty: 120 TAB | Refills: 5 | Status: SHIPPED | OUTPATIENT
Start: 2017-08-21 | End: 2018-11-23 | Stop reason: SDUPTHER

## 2017-08-21 RX ORDER — FUROSEMIDE 40 MG/1
40 TABLET ORAL DAILY
Qty: 30 TAB | Refills: 2 | Status: SHIPPED | OUTPATIENT
Start: 2017-08-21 | End: 2017-09-11 | Stop reason: SDUPTHER

## 2017-08-21 RX ORDER — SIMVASTATIN 40 MG/1
40 TABLET, FILM COATED ORAL
Qty: 90 TAB | Refills: 2 | Status: SHIPPED | OUTPATIENT
Start: 2017-08-21 | End: 2018-04-10

## 2017-08-21 RX ORDER — FUROSEMIDE 40 MG/1
40 TABLET ORAL DAILY
Qty: 30 TAB | Refills: 1 | Status: CANCELLED | OUTPATIENT
Start: 2017-08-21

## 2017-08-21 RX ORDER — LOSARTAN POTASSIUM 100 MG/1
100 TABLET ORAL DAILY
Qty: 90 TAB | Refills: 1 | Status: CANCELLED | OUTPATIENT
Start: 2017-08-21

## 2017-08-21 RX ORDER — SIMVASTATIN 40 MG/1
40 TABLET, FILM COATED ORAL
Qty: 90 TAB | Refills: 2 | Status: CANCELLED | OUTPATIENT
Start: 2017-08-21 | End: 2017-11-19

## 2017-08-21 RX ORDER — FELODIPINE 5 MG/1
5 TABLET, EXTENDED RELEASE ORAL DAILY
Qty: 30 TAB | Refills: 1 | Status: CANCELLED | OUTPATIENT
Start: 2017-08-21

## 2017-08-21 NOTE — MR AVS SNAPSHOT
Visit Information Date & Time Provider Department Dept. Phone Encounter #  
 8/21/2017 10:10 AM Nora Gray MD [facility] [place] [id number] Follow-up Instructions Return in about 4 months (around 12/21/2017). Upcoming Health Maintenance Date Due INFLUENZA AGE 9 TO ADULT 8/1/2017 MEDICARE YEARLY EXAM 4/4/2018 GLAUCOMA SCREENING Q2Y 4/28/2019 DTaP/Tdap/Td series (2 - Td) 1/6/2027 Allergies as of 8/21/2017  Review Complete On: 8/21/2017 By: Nora Gray MD  
  
 Severity Noted Reaction Type Reactions Ace Inhibitors  05/24/2010    Other (comments) Cough. Also has unclear reaction to ARB's  
  
Current Immunizations  Reviewed on 2/25/2016 Name Date H1N1 FLU VACCINE 2/24/2010 Influenza Vaccine 10/15/2015, 10/10/2014, 10/4/2013, 2/7/2013 Influenza Vaccine (Quad) PF 11/22/2016 Influenza Vaccine Split 10/14/2011, 10/6/2010 Influenza Vaccine Whole 12/6/2009 Pneumococcal Conjugate (PCV-13) 12/10/2015 TD Vaccine 6/21/2006 Tdap 1/6/2017 ZZZ-RETIRED (DO NOT USE) Pneumococcal Vaccine (Unspecified Type) 10/6/2008, 9/8/2003 Not reviewed this visit You Were Diagnosed With   
  
 Codes Comments Essential hypertension    -  Primary ICD-10-CM: I10 
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 3 Vitals BP Pulse Temp Resp Height(growth percentile) Weight(growth percentile)  145/48 (BP 1 Location: Right arm, BP Patient Position: Sitting) (!) 52 98.2 °F (36.8 °C) (Oral) 16 5' 3\" (1.6 m) 120 lb (54.4 kg) SpO2 BMI OB Status Smoking Status 99% 21.26 kg/m2 Hysterectomy Never Smoker Vitals History BMI and BSA Data Body Mass Index Body Surface Area  
 21.26 kg/m 2 1.56 m 2 Preferred Pharmacy Pharmacy Name Phone [facility] [address] [place] [address] [phone] Your Updated Medication List  
  
   
This list is accurate as of: 8/21/17 10:35 AM.  Always use your most recent med list.  
  
  
  
  
 albuterol 90 mcg/actuation inhaler Commonly known as:  PROVENTIL HFA, VENTOLIN HFA, PROAIR HFA Take 2 Puffs by inhalation every six (6) hours as needed for Wheezing (use her insurance's generic). aspirin delayed-release 81 mg tablet Take 81 mg by mouth daily. felodipine 5 mg 24 hr tablet Commonly known as:  PLENDIL SR Take 1 Tab by mouth daily. fexofenadine 180 mg tablet Commonly known as:  Cynthia Savanna Take 1 Tab by mouth daily. For allergies. FISH OIL 1,000 mg Cap Generic drug:  omega-3 fatty acids-vitamin e Take 1 Cap by mouth. furosemide 40 mg tablet Commonly known as:  LASIX Take 1 Tab by mouth daily. hydrALAZINE 50 mg tablet Commonly known as:  APRESOLINE Take 1 Tab by mouth four (4) times daily. Indications: hypertension  
  
 levothyroxine 25 mcg tablet Commonly known as:  SYNTHROID Take 1 Tab by mouth Daily (before breakfast). losartan 100 mg tablet Commonly known as:  COZAAR Take 1 Tab by mouth daily. nitroglycerin 2 % ointment Commonly known as:  NITROBID Apply 0.5 Inches to affected area every four (4) hours. Apply 1/2 inch to the chest when systolic blood pressure is > 170. simvastatin 40 mg tablet Commonly known as:  ZOCOR Take 1 Tab by mouth nightly for 90 days. Indications: mixed hyperlipidemia VITAMIN B-12 1,000 mcg tablet Generic drug:  cyanocobalamin Take 1,000 mcg by mouth daily. VITAMIN D3 1,000 unit tablet Generic drug:  cholecalciferol Take 1,000 Units by mouth daily. Prescriptions Sent to Pharmacy Refills  
 felodipine (PLENDIL SR) 5 mg 24 hr tablet 2 Sig: Take 1 Tab by mouth daily. Class: Normal  
 Pharmacy: [place] [address] Ph #: [phone] Route: Oral  
 furosemide (LASIX) 40 mg tablet 2 Sig: Take 1 Tab by mouth daily. Class: Normal  
 Pharmacy: [place] [address] Ph #: [phone] Route: Oral  
 losartan (COZAAR) 100 mg tablet 2 Sig: Take 1 Tab by mouth daily. Class: Normal  
 Pharmacy: [place] [address] Ph #: [phone] Route: Oral  
 simvastatin (ZOCOR) 40 mg tablet 2 Sig: Take 1 Tab by mouth nightly for 90 days. Indications: mixed hyperlipidemia Class: Normal  
 Pharmacy: [place] [address] Ph #: [phone] Route: Oral  
  
We Performed the Following HEMOGLOBIN [id number] CPT(R)] LIPID PANEL [63932 CPT(R)] METABOLIC PANEL, COMPREHENSIVE [55463 CPT(R)] PHOSPHORUS [77042 CPT(R)] PROT+CREATU (RANDOM) [id number] CPT(R)] PTH INTACT [34824 CPT(R)] VITAMIN D, 25 HYDROXY [id number] CPT(R)] Follow-up Instructions Return in about 4 months (around 12/21/2017). Patient Instructions Home Blood Pressure Test: About This Test 
What is it? A home blood pressure test allows you to keep track of your blood pressure at home. Blood pressure is a measure of the force of blood against the walls of your arteries. Blood pressure readings include two numbers, such as 130/80 (say \"130 over 80\"). The first number is the systolic pressure. The second number is the diastolic pressure. Why is this test done? You may do this test at home to: · Find out if you have high blood pressure. · Track your blood pressure if you have high blood pressure. · Track how well medicine is working to reduce high blood pressure. · Check how lifestyle changes, such as weight loss and exercise, are affecting blood pressure. How can you prepare for the test? 
· Do not use caffeine, tobacco, or medicines known to raise blood pressure (such as nasal decongestant sprays) for at least 30 minutes before taking your blood pressure. · Do not exercise for at least 30 minutes before taking your blood pressure. What happens before the test? 
Take your blood pressure while you feel comfortable and relaxed. Sit quietly with both feet on the floor for at least 5 minutes before the test. 
What happens during the test? 
· Sit with your arm slightly bent and resting on a table so that your upper arm is at the same level as your heart. · Roll up your sleeve or take off your shirt to expose your upper arm. · Wrap the blood pressure cuff around your upper arm so that the lower edge of the cuff is about 1 inch above the bend of your elbow. Proceed with the following steps depending on if you are using an automatic or manual pressure monitor. Automatic blood pressure monitors · Press the on/off button on the automatic monitor and wait until the ready-to-measure \"heart\" symbol appears next to zero in the display window. · Press the start button. The cuff will inflate and deflate by itself. · Your blood pressure numbers will appear on the screen. · Write your numbers in your log book, along with the date and time. Manual blood pressure monitors · Place the earpieces of a stethoscope in your ears, and place the bell of the stethoscope over the artery, just below the cuff. · Close the valve on the rubber inflating bulb.  
· Squeeze the bulb rapidly with your opposite hand to inflate the cuff until the dial or column of mercury reads about 30 mm Hg higher than your usual systolic pressure. If you do not know your usual pressure, inflate the cuff to 210 mm Hg or until the pulse at your wrist disappears. · Open the pressure valve just slightly by twisting or pressing the valve on the bulb. · As you watch the pressure slowly fall, note the level on the dial at which you first start to hear a pulsing or tapping sound through the stethoscope. This is your systolic blood pressure. · Continue letting the air out slowly. The sounds will become muffled and will finally disappear. Note the pressure when the sounds completely disappear. This is your diastolic blood pressure. Let out all the remaining air. · Write your numbers in your log book, along with the date and time. What else should you know about the test? 
Results for adults ages 25 and older (mm Hg): · Normal (ideal): Systolic 790 or below. Diastolic 79 or below. · Prehypertension: Systolic 540 to 287. Diastolic 80 to 89. · Hypertension: Systolic 891 or above. Diastolic 90 or above. Follow-up care is a key part of your treatment and safety. Be sure to make and go to all appointments, and call your doctor if you are having problems. It's also a good idea to keep a list of the medicines you take. Where can you learn more? Go to http://rafi-alejandro.info/. Enter C427 in the search box to learn more about \"Home Blood Pressure Test: About This Test.\" Current as of: November 3, 2016 Content Version: 11.3 © 0753-4865 Poderopedia. Care instructions adapted under license by [employer] (which disclaims liability or warranty for this information). If you have questions about a medical condition or this instruction, always ask your healthcare professional. [place] [address] any warranty or liability for your use of this information. [address] Affiliated with [address] [place] [address],  [address] [place], [address] [place] [address] [phone] Monitor blood pressure outside the office several times weekly at different times during the day and evening. Bring the record to me in 3 weeks for review. Blood Pressure Record Patient Name:  ______________________ :  ______________________ Date/Time BP Reading Pulse Please provide this summary of care documentation to your next provider. Your primary care clinician is listed as [address]. If you have any questions after today's visit, please call [phone].

## 2017-08-21 NOTE — PATIENT INSTRUCTIONS
Home Blood Pressure Test: About This Test  What is it? A home blood pressure test allows you to keep track of your blood pressure at home. Blood pressure is a measure of the force of blood against the walls of your arteries. Blood pressure readings include two numbers, such as 130/80 (say \"130 over 80\"). The first number is the systolic pressure. The second number is the diastolic pressure. Why is this test done? You may do this test at home to:  · Find out if you have high blood pressure. · Track your blood pressure if you have high blood pressure. · Track how well medicine is working to reduce high blood pressure. · Check how lifestyle changes, such as weight loss and exercise, are affecting blood pressure. How can you prepare for the test?  · Do not use caffeine, tobacco, or medicines known to raise blood pressure (such as nasal decongestant sprays) for at least 30 minutes before taking your blood pressure. · Do not exercise for at least 30 minutes before taking your blood pressure. What happens before the test?  Take your blood pressure while you feel comfortable and relaxed. Sit quietly with both feet on the floor for at least 5 minutes before the test.  What happens during the test?  · Sit with your arm slightly bent and resting on a table so that your upper arm is at the same level as your heart. · Roll up your sleeve or take off your shirt to expose your upper arm. · Wrap the blood pressure cuff around your upper arm so that the lower edge of the cuff is about 1 inch above the bend of your elbow. Proceed with the following steps depending on if you are using an automatic or manual pressure monitor. Automatic blood pressure monitors  · Press the on/off button on the automatic monitor and wait until the ready-to-measure \"heart\" symbol appears next to zero in the display window. · Press the start button. The cuff will inflate and deflate by itself.   · Your blood pressure numbers will appear on the screen. · Write your numbers in your log book, along with the date and time. Manual blood pressure monitors  · Place the earpieces of a stethoscope in your ears, and place the bell of the stethoscope over the artery, just below the cuff. · Close the valve on the rubber inflating bulb. · Squeeze the bulb rapidly with your opposite hand to inflate the cuff until the dial or column of mercury reads about 30 mm Hg higher than your usual systolic pressure. If you do not know your usual pressure, inflate the cuff to 210 mm Hg or until the pulse at your wrist disappears. · Open the pressure valve just slightly by twisting or pressing the valve on the bulb. · As you watch the pressure slowly fall, note the level on the dial at which you first start to hear a pulsing or tapping sound through the stethoscope. This is your systolic blood pressure. · Continue letting the air out slowly. The sounds will become muffled and will finally disappear. Note the pressure when the sounds completely disappear. This is your diastolic blood pressure. Let out all the remaining air. · Write your numbers in your log book, along with the date and time. What else should you know about the test?  Results for adults ages 25 and older (mm Hg):  · Normal (ideal): Systolic 645 or below. Diastolic 79 or below. · Prehypertension: Systolic 687 to 943. Diastolic 80 to 89. · Hypertension: Systolic 572 or above. Diastolic 90 or above. Follow-up care is a key part of your treatment and safety. Be sure to make and go to all appointments, and call your doctor if you are having problems. It's also a good idea to keep a list of the medicines you take. Where can you learn more? Go to http://rafi-alejandro.info/. Enter C427 in the search box to learn more about \"Home Blood Pressure Test: About This Test.\"  Current as of: November 3, 2016  Content Version: 11.3  © 5007-6523 [employer].  Care instructions adapted under license by [facility] (which disclaims liability or warranty for this information). If you have questions about a medical condition or this instruction, always ask your healthcare professional. Tk Perkins any warranty or liability for your use of this information. Billy Simpson with [facility]  [address] [place] [address],  [address] Samira Chun, [address] [place] [address]  [phone]    Monitor blood pressure outside the office several times weekly at different times during the day and evening. Bring the record to me in 3 weeks for review.     Blood Pressure Record     Patient Name:  ______________________ :  ______________________    Date/Time BP Reading Pulse

## 2017-08-22 LAB
25(OH)D3+25(OH)D2 SERPL-MCNC: 52.3 NG/ML (ref 30–100)
ALBUMIN SERPL-MCNC: 4.4 G/DL (ref 3.2–4.6)
ALBUMIN/GLOB SERPL: 2 {RATIO} (ref 1.2–2.2)
ALP SERPL-CCNC: 70 IU/L (ref 39–117)
ALT SERPL-CCNC: 7 IU/L (ref 0–32)
AST SERPL-CCNC: 14 IU/L (ref 0–40)
BILIRUB SERPL-MCNC: 0.5 MG/DL (ref 0–1.2)
BUN SERPL-MCNC: 49 MG/DL (ref 10–36)
BUN/CREAT SERPL: 27 (ref 12–28)
CALCIUM SERPL-MCNC: 10.8 MG/DL (ref 8.7–10.3)
CHLORIDE SERPL-SCNC: 102 MMOL/L (ref 96–106)
CHOLEST SERPL-MCNC: 175 MG/DL (ref 100–199)
CO2 SERPL-SCNC: 25 MMOL/L (ref 18–29)
CREAT SERPL-MCNC: 1.81 MG/DL (ref 0.57–1)
CREAT UR-MCNC: 89.5 MG/DL
GLOBULIN SER CALC-MCNC: 2.2 G/DL (ref 1.5–4.5)
GLUCOSE SERPL-MCNC: 86 MG/DL (ref 65–99)
HDLC SERPL-MCNC: 55 MG/DL
HGB BLD-MCNC: 10.6 G/DL (ref 11.1–15.9)
INTERPRETATION: NORMAL
LDLC SERPL CALC-MCNC: 97 MG/DL (ref 0–99)
PHOSPHATE SERPL-MCNC: 3.7 MG/DL (ref 2.5–4.5)
POTASSIUM SERPL-SCNC: 4.3 MMOL/L (ref 3.5–5.2)
PROT SERPL-MCNC: 6.6 G/DL (ref 6–8.5)
PROT UR-MCNC: 21 MG/DL
PROT/CREAT UR: 235 MG/G CREAT (ref 0–200)
PTH-INTACT SERPL-MCNC: 146 PG/ML (ref 15–65)
SODIUM SERPL-SCNC: 143 MMOL/L (ref 134–144)
TRIGL SERPL-MCNC: 116 MG/DL (ref 0–149)
TSH SERPL DL<=0.005 MIU/L-ACNC: 2.47 UIU/ML (ref 0.45–4.5)
VLDLC SERPL CALC-MCNC: 23 MG/DL (ref 5–40)

## 2017-08-22 NOTE — PROGRESS NOTES
Progress Note    Patient: Desi Alcantara MRN: [id number]  SSN: [id number]    YOB: 1923  Age: 80 y.o. Sex: female        Chief Complaint   Patient presents with    Hypertension    Cholesterol Problem    Chronic Kidney Disease         Subjective:     Encounter Diagnoses   Name Primary?  Essential hypertension:Well controlled. BP Readings from Last 3 Encounters:   08/21/17 122/47   05/26/17 117/56   04/21/17 136/55     The patient reports:  taking medications as instructed, no medication side effects noted, no TIA's, no chest pain on exertion, no dyspnea on exertion, no swelling of ankles. Lab Results   Component Value Date/Time    Sodium 143 08/21/2017 12:21 PM    Potassium 4.3 08/21/2017 12:21 PM    Chloride 102 08/21/2017 12:21 PM    CO2 25 08/21/2017 12:21 PM    Anion gap 10 10/06/2010 10:34 AM    Glucose 86 08/21/2017 12:21 PM    BUN 49 08/21/2017 12:21 PM    Creatinine 1.81 08/21/2017 12:21 PM    BUN/Creatinine ratio 27 08/21/2017 12:21 PM    GFR est AA 27 08/21/2017 12:21 PM    GFR est non-AA 24 08/21/2017 12:21 PM    Calcium 10.8 08/21/2017 12:21 PM    Bilirubin, total 0.5 08/21/2017 12:21 PM    AST (SGOT) 14 08/21/2017 12:21 PM    Alk. phosphatase 70 08/21/2017 12:21 PM    Protein, total 6.6 08/21/2017 12:21 PM    Albumin 4.4 08/21/2017 12:21 PM    Globulin 3.2 10/06/2010 10:34 AM    A-G Ratio 2.0 08/21/2017 12:21 PM    ALT (SGPT) 7 08/21/2017 12:21 PM     Our goal is to normalize the blood pressure to decrease the risks of strokes and heart attacks. The patient is in agreement with the plan. 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  Bilateral edema of lower extremity: Treated successfully and absent on exam.         Mixed hyperlipidemia:  Cardiovascular risks for her are: LDL goal is under 100  hypertension  hyperlipidemia.    Currently she takes Zocor (simvastatin) , 40 mg  Lab Results   Component Value Date/Time Cholesterol, total 175 08/21/2017 12:21 PM    HDL Cholesterol 55 08/21/2017 12:21 PM    LDL, calculated 97 08/21/2017 12:21 PM    Triglyceride 116 08/21/2017 12:21 PM    CHOL/HDL Ratio 3.3 10/06/2010 10:34 AM     Lab Results   Component Value Date/Time    ALT (SGPT) 7 08/21/2017 12:21 PM    AST (SGOT) 14 08/21/2017 12:21 PM    Alk. phosphatase 70 08/21/2017 12:21 PM    Bilirubin, total 0.5 08/21/2017 12:21 PM      Myalgias: No   Fatigue: No   Other side effects: no  Wt Readings from Last 3 Encounters:   08/21/17 120 lb (54.4 kg)   05/26/17 120 lb 12.8 oz (54.8 kg)   04/21/17 121 lb 9.6 oz (55.2 kg)     The patient is aware of our goal to reduce or eliminate the long term problems (such as strokes and heart attacks) related to poorly controlled hyperlipidemia.  Hypothyroidism due to acquired atrophy of thyroid:  Lab Results   Component Value Date/Time    TSH 2.470 08/21/2017 12:21 PM    T4, Free 1.30 11/22/2016 10:34 AM      Denies fatigue, nervousness,weight changes, heat orcold intolerance, bowel changes,skin changes, cardiovascular symptoms, hair loss, feeling excessive energy, tremor, palpitations and weight loss. Thyroid medication has been unchanged since last medication check and labs.  CRI (chronic renal insufficiency), stage 3 (moderate): Stable. Lab Results   Component Value Date/Time    GFR est AA 27 08/21/2017 12:21 PM    GFR est non-AA 24 08/21/2017 12:21 PM    Creatinine 1.81 08/21/2017 12:21 PM    BUN 49 08/21/2017 12:21 PM    Sodium 143 08/21/2017 12:21 PM    Potassium 4.3 08/21/2017 12:21 PM    Chloride 102 08/21/2017 12:21 PM    CO2 25 08/21/2017 12:21 PM                 Current and past medical information:    Current Medications after this visit[de-identified]     Current Outpatient Prescriptions   Medication Sig    felodipine (PLENDIL SR) 5 mg 24 hr tablet Take 1 Tab by mouth daily.  furosemide (LASIX) 40 mg tablet Take 1 Tab by mouth daily.     losartan (COZAAR) 100 mg tablet Take 1 Tab by mouth daily.  simvastatin (ZOCOR) 40 mg tablet Take 1 Tab by mouth nightly for 90 days. Indications: mixed hyperlipidemia    nitroglycerin (NITROBID) 2 % ointment Apply 0.5 Inches to affected area every four (4) hours. Apply 1/2 inch to the chest when systolic blood pressure is > 170.  levothyroxine (SYNTHROID) 25 mcg tablet Take 1 Tab by mouth Daily (before breakfast).  albuterol (PROVENTIL HFA, VENTOLIN HFA, PROAIR HFA) 90 mcg/actuation inhaler Take 2 Puffs by inhalation every six (6) hours as needed for Wheezing (use her insurance's generic).  fexofenadine (ALLEGRA) 180 mg tablet Take 1 Tab by mouth daily. For allergies.  omega-3 fatty acids-vitamin e (FISH OIL) 1,000 mg Cap Take 1 Cap by mouth.  aspirin delayed-release 81 mg tablet Take 81 mg by mouth daily.  cholecalciferol, vitamin d3, (VITAMIN D) 1,000 unit tablet Take 1,000 Units by mouth daily.  cyanocobalamin (VITAMIN B-12) 1,000 mcg tablet Take 1,000 mcg by mouth daily.  hydrALAZINE (APRESOLINE) 50 mg tablet Take 1 Tab by mouth four (4) times daily. Indications: hypertension     No current facility-administered medications for this visit.         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 Also has unclear reaction to ARB's       Past Surgical History:   Procedure Laterality Date    HX GYN      hysterectomy,btl    HX GYN  9/6/11    complete hysterectomy    HX HEENT  10/10. bilateral cataract removal.       Social History     Social History    Marital status:      Spouse name: N/A    Number of children: N/A    Years of education: N/A     Social History Main Topics    Smoking status: Never Smoker    Smokeless tobacco: Never Used    Alcohol use No    Drug use: No    Sexual activity: Not Asked     Other Topics Concern    None     Social History Narrative     Review of Systems   Constitutional: Negative. Negative for chills, fever, malaise/fatigue and weight loss. HENT: Negative. Negative for hearing loss. Eyes: Negative. Negative for blurred vision and double vision. Respiratory: Negative. Negative for cough, hemoptysis, sputum production and shortness of breath. Cardiovascular: Negative. Negative for chest pain, palpitations, orthopnea and leg swelling. Gastrointestinal: Negative. Negative for abdominal pain, blood in stool, heartburn, nausea and vomiting. Genitourinary: Negative. Negative for dysuria, frequency and urgency. Musculoskeletal: Negative. Negative for back pain, myalgias and neck pain. Skin: Negative. Negative for rash. Neurological: Negative. Negative for dizziness, tingling, tremors, weakness and headaches. Endo/Heme/Allergies: Negative. Psychiatric/Behavioral: Negative. Negative for depression. Objective:     Vitals:    08/21/17 1013 08/21/17 1039   BP: 145/48 122/47   Pulse: (!) 52    Resp: 16    Temp: 98.2 °F (36.8 °C)    TempSrc: Oral    SpO2: 99%    Weight: 120 lb (54.4 kg)    Height: 5' 3\" (1.6 m)       Body mass index is 21.26 kg/(m^2). Physical Exam   Constitutional: She is oriented to person, place, and time and well-developed, well-nourished, and in no distress. No distress.    She looks at least 10 years younger than her stated age. She feels well and has a good energy level. HENT:   Head: Normocephalic and atraumatic. Mouth/Throat: Oropharynx is clear and moist.   Eyes: Conjunctivae are normal.   Neck: No tracheal deviation present. No thyromegaly present. Cardiovascular: Normal rate, regular rhythm and normal heart sounds. No murmur heard. Pulmonary/Chest: Effort normal and breath sounds normal. No respiratory distress. Abdominal: Soft. She exhibits no distension. Lymphadenopathy:     She has no cervical adenopathy. Neurological: She is alert and oriented to person, place, and time. Skin: Skin is warm. No rash noted. She is not diaphoretic. No erythema. Psychiatric: Mood and affect normal.   Nursing note and vitals reviewed. Health Maintenance Due   Topic Date Due    INFLUENZA AGE 9 TO ADULT  08/01/2017         Assessment and orders:       ICD-10-CM ICD-9-CM    1. Essential hypertension-Controlled at rest, mild elevation walking. I10 401.9 losartan (COZAAR) 100 mg tablet      LIPID PANEL      METABOLIC PANEL, COMPREHENSIVE      HEMOGLOBIN      PROT+CREATU (RANDOM)   2. 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 Bilateral edema of lower extremity  -Corrected   R60.0 782.3 furosemide (LASIX) 40 mg tablet   4. Mixed hyperlipidemia-At goal E78.2 272.2 simvastatin (ZOCOR) 40 mg tablet      LIPID PANEL      METABOLIC PANEL, COMPREHENSIVE   5. Hypothyroidism due to acquired atrophy of thyroid-Corrected E03.4 244.8 TSH 3RD GENERATION     246.8    6.  CRI (chronic renal insufficiency), stage 3 (moderate)-Fluctuates but is basically unchanged N18.3 585.3 LIPID PANEL      METABOLIC PANEL, COMPREHENSIVE      HEMOGLOBIN      PROT+CREATU (RANDOM)      PTH INTACT VITAMIN D, 25 HYDROXY      PHOSPHORUS         Plan of care:  Discussed diagnoses in detail with patient. Medication risks/benefits/side effects discussed with patient. All of the patient's questions were addressed. The patient understands and agrees with our plan of care. The patient knows to call back if they are unsure of or forget any changes we discussed today or if the symptoms change. The patient received an After-Visit Summary which contains VS, orders, medication list and allergy list. This can be used as a \"mini-medical record\" should they have to seek medical care while out of town. Patient Care Team:  Cuco Zayas MD as PCP - General    Follow-up Disposition:  Return in about 4 months (around 12/21/2017). No future appointments.     Signed By: Cuco Zayas MD     August 22, 2017

## 2017-09-01 ENCOUNTER — CLINICAL SUPPORT (OUTPATIENT)
Dept: FAMILY MEDICINE CLINIC | Age: 82
End: 2017-09-01

## 2017-09-01 VITALS — TEMPERATURE: 96 F

## 2017-09-01 DIAGNOSIS — Z23 ENCOUNTER FOR IMMUNIZATION: ICD-10-CM

## 2017-09-01 NOTE — PROGRESS NOTES
Alissa Rao is a 80 y.o. female who presents for routine immunizations. She denies any symptoms , reactions or allergies that would exclude them from being immunized today. Risks and adverse reactions were discussed and the VIS was given to them. All questions were addressed. . There were no reactions observed.     Karyn Kimbrough LPN

## 2017-09-11 DIAGNOSIS — R60.0 BILATERAL EDEMA OF LOWER EXTREMITY: ICD-10-CM

## 2017-09-11 DIAGNOSIS — I13.10 MALIGNANT HTN WITH HEART DISEASE, W/O CHF, WITH CHRONIC KIDNEY DISEASE: ICD-10-CM

## 2017-09-11 RX ORDER — FELODIPINE 5 MG/1
5 TABLET, EXTENDED RELEASE ORAL DAILY
Qty: 90 TAB | Refills: 2 | Status: SHIPPED | OUTPATIENT
Start: 2017-09-11 | End: 2017-10-16 | Stop reason: SDUPTHER

## 2017-09-11 RX ORDER — FUROSEMIDE 40 MG/1
40 TABLET ORAL DAILY
Qty: 30 TAB | Refills: 2 | Status: SHIPPED | OUTPATIENT
Start: 2017-09-11 | End: 2017-10-16 | Stop reason: SDUPTHER

## 2017-10-16 DIAGNOSIS — R60.0 BILATERAL EDEMA OF LOWER EXTREMITY: ICD-10-CM

## 2017-10-16 DIAGNOSIS — I13.10 MALIGNANT HTN WITH HEART DISEASE, W/O CHF, WITH CHRONIC KIDNEY DISEASE: ICD-10-CM

## 2017-10-16 RX ORDER — FELODIPINE 5 MG/1
5 TABLET, EXTENDED RELEASE ORAL DAILY
Qty: 90 TAB | Refills: 2 | Status: SHIPPED | OUTPATIENT
Start: 2017-10-16 | End: 2019-01-10 | Stop reason: SDUPTHER

## 2017-10-16 RX ORDER — FUROSEMIDE 40 MG/1
40 TABLET ORAL DAILY
Qty: 30 TAB | Refills: 2 | Status: SHIPPED | OUTPATIENT
Start: 2017-10-16 | End: 2018-09-05 | Stop reason: SDUPTHER

## 2018-04-10 ENCOUNTER — OFFICE VISIT (OUTPATIENT)
Dept: FAMILY MEDICINE CLINIC | Age: 83
End: 2018-04-10

## 2018-04-10 VITALS
RESPIRATION RATE: 20 BRPM | SYSTOLIC BLOOD PRESSURE: 125 MMHG | HEART RATE: 70 BPM | WEIGHT: 115 LBS | DIASTOLIC BLOOD PRESSURE: 67 MMHG | HEIGHT: 63 IN | TEMPERATURE: 96.7 F | OXYGEN SATURATION: 98 % | BODY MASS INDEX: 20.38 KG/M2

## 2018-04-10 DIAGNOSIS — Z13.31 SCREENING FOR DEPRESSION: ICD-10-CM

## 2018-04-10 DIAGNOSIS — Z13.39 SCREENING FOR ALCOHOLISM: ICD-10-CM

## 2018-04-10 DIAGNOSIS — Z00.00 MEDICARE ANNUAL WELLNESS VISIT, SUBSEQUENT: Primary | ICD-10-CM

## 2018-04-10 DIAGNOSIS — I10 ESSENTIAL HYPERTENSION: Chronic | ICD-10-CM

## 2018-04-10 NOTE — PROGRESS NOTES
Health Maintenance Due   Topic Date Due    MEDICARE YEARLY EXAM  04/04/2018     There is no height or weight on file to calculate BMI. 1. Have you been to the ER, urgent care clinic since your last visit? Hospitalized since your last visit? No    2. Have you seen or consulted any other health care providers outside of the [facility] since your last visit? Include any pap smears or colon screening.  No  Reviewed record in preparation for visit and have necessary documentation  Pt did not bring medication to office visit for review  Information was given to pt on Advanced Directives, Living Will  Information was given on Shingles Vaccine  opportunity was given for questions  Goals that were addressed and/or need to be completed during or after this appointment include

## 2018-04-10 NOTE — PROGRESS NOTES
[address], [address] [place] [address]  [phone]           Date of visit: 4/10/2018     Herman Crenshaw MD    This is an Subsequent Medicare Annual Wellness Visit (AWV), (Performed more than 12 months after effective date of Medicare Part B enrollment and 12 months after last preventive visit, Once in a lifetime)    I have reviewed the patient's medical history in detail and updated the computerized patient record. Renée Tyler is a 80 y.o. female   History obtained from: the patient. 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 bilateral cataract removal.     Allergies   Allergen Reactions    Ace Inhibitors Other (comments)     Cough. Also has unclear reaction to ARB's     Current Outpatient Prescriptions   Medication Sig Dispense Refill    felodipine (PLENDIL SR) 5 mg 24 hr tablet Take 1 Tab by mouth daily. 90 Tab 2    furosemide (LASIX) 40 mg tablet Take 1 Tab by mouth daily. 30 Tab 2    hydrALAZINE (APRESOLINE) 50 mg tablet Take 1 Tab by mouth four (4) times daily.  Indications: hypertension 120 Tab 5    losartan (COZAAR) 100 mg tablet Take 1 Tab by mouth daily. 90 Tab 2    simvastatin (ZOCOR) 40 mg tablet Take 1 Tab by mouth nightly for 90 days. Indications: mixed hyperlipidemia 90 Tab 2    nitroglycerin (NITROBID) 2 % ointment Apply 0.5 Inches to affected area every four (4) hours. Apply 1/2 inch to the chest when systolic blood pressure is > 170. 60 g 0    levothyroxine (SYNTHROID) 25 mcg tablet Take 1 Tab by mouth Daily (before breakfast). 90 Tab 3    albuterol (PROVENTIL HFA, VENTOLIN HFA, PROAIR HFA) 90 mcg/actuation inhaler Take 2 Puffs by inhalation every six (6) hours as needed for Wheezing (use her insurance's generic). 1 Inhaler 5    fexofenadine (ALLEGRA) 180 mg tablet Take 1 Tab by mouth daily. For allergies. 30 Tab 5    omega-3 fatty acids-vitamin e (FISH OIL) 1,000 mg Cap Take 1 Cap by mouth.  aspirin delayed-release 81 mg tablet Take 81 mg by mouth daily.  cholecalciferol, vitamin d3, (VITAMIN D) 1,000 unit tablet Take 1,000 Units by mouth daily.  cyanocobalamin (VITAMIN B-12) 1,000 mcg tablet Take 1,000 mcg by mouth daily. Family History   Problem Relation Age of Onset    Heart Disease Sister      Social History   Substance Use Topics    Smoking status: Never Smoker    Smokeless tobacco: Never Used    Alcohol use No       Specialists/Care Team   Patricia Kahn has established care with the following healthcare providers:  Patient Care Team:  Diona Favre, MD as PCP - [place] [address]     Demographics   female  80 y.o.     General Health Questions   -During the past 4 weeks:   -how would you rate your health in general? Good   -how often have you been bothered by feeling dizzy when standing up? never   -how much have you been bothered by bodily pain? not   -Have you noticed any hearing difficulties? no   -has your physical and emotional health limited your social activities with family or friends? no    Emotional Health Questions   -Do you have a history of depression, anxiety, or emotional problems? no  -Over the past 2 weeks, have you felt down, depressed or hopeless? no  -Over the past 2 weeks, have you felt little interest or pleasure in doing things? no    Health Habits   Please describe your diet habits: self grade A  Do you get 5 servings of fruits or vegetables daily? yes  Do you exercise regularly? yes    Alcohol Risk Factor Screening: On any occasion during the past 3 months, have you had more than 4 drinks containing alcohol? No     Do you average more than 14 drinks per week? No       Activities of Daily Living and Functional Status   -Do you need help with eating, walking, dressing, bathing, toileting, the phone, transportation, shopping, preparing meals, housework, laundry, medications or managing money? no  -In the past four weeks, was someone available to help you if you needed and wanted help with anything? yes  -Are you confident are you that you can control and manage most of your health problems? yes  -Have you been given information to help you keep track of your medications? yes  -How often do you have trouble taking your medications as prescribed? never    Fall Risk and Home Safety   Have you fallen 2 or more times in the past year? no  Does your home have rugs in the hallway, lack grab bars in the bathroom, lack handrails on the stairs or have poor lighting? no  Do you have smoke detectors and check them regularly? yes  Do you have difficulties driving a car? n/a  Do you always fasten your seat belt when you are in a car? yes    Review of Systems (if indicated for problems addressed today)   Review of Systems   Constitutional: Negative. Negative for chills, fever, malaise/fatigue and weight loss. Delightful person who provides a detailed accurate history. Has lost 5 pounds on low carb diet with ensure daily. Increase ensure to BID. HENT: Negative. Negative for hearing loss. Eyes: Negative. Negative for blurred vision and double vision.    Respiratory: Negative. Negative for cough, hemoptysis, sputum production and shortness of breath. Cardiovascular: Negative. Negative for chest pain, palpitations and orthopnea. Gastrointestinal: Negative. Negative for abdominal pain, blood in stool, heartburn, nausea and vomiting. Genitourinary: Negative. Negative for dysuria, frequency and urgency. Musculoskeletal: Negative. Negative for back pain, myalgias and neck pain. Skin: Negative. Negative for rash. Neurological: Negative. Negative for dizziness, tingling, tremors, weakness and headaches. Endo/Heme/Allergies: Negative. Psychiatric/Behavioral: Negative. Negative for depression. Physical Examination     Wt Readings from Last 3 Encounters:   04/10/18 115 lb (52.2 kg)   08/21/17 120 lb (54.4 kg)   05/26/17 120 lb 12.8 oz (54.8 kg)     Temp Readings from Last 3 Encounters:   04/10/18 96.7 °F (35.9 °C) (Oral)   09/01/17 96 °F (35.6 °C) (Oral)   08/21/17 98.2 °F (36.8 °C) (Oral)     BP Readings from Last 3 Encounters:   04/10/18 125/67   08/21/17 122/47   05/26/17 117/56     Pulse Readings from Last 3 Encounters:   04/10/18 70   08/21/17 (!) 52   05/26/17 63       Body mass index is 20.37 kg/(m^2). No exam data present  Was the patient's timed Up & Go test unsteady or longer than 10 seconds? no    Evaluation of Cognitive Function   Mood/affect:  happy  Orientation: Person, Place, Time and Situation  Appearance: age appropriate and casually dressed  Family member/caregiver input: daughter    Additional exam if indicated for problems addressed today:  Physical Exam   Constitutional: She is oriented to person, place, and time. She appears well-developed and well-nourished. No distress. HENT:   Head: Normocephalic and atraumatic. Mouth/Throat: Oropharynx is clear and moist.   Eyes: Conjunctivae are normal. No scleral icterus. Neck: No thyromegaly present. No carotid bruit.    Cardiovascular: Normal rate, regular rhythm and normal heart sounds. Exam reveals no gallop and no friction rub. No murmur heard. Pulmonary/Chest: Effort normal and breath sounds normal. She has no wheezes. She has no rales. Abdominal: Soft. Bowel sounds are normal. She exhibits no distension. There is no tenderness. There is no rebound and no guarding. Musculoskeletal: She exhibits no edema. Lymphadenopathy:     She has no cervical adenopathy. Neurological: She is alert and oriented to person, place, and time. Skin: Skin is warm and dry. No rash noted. She is not diaphoretic. Psychiatric: She has a normal mood and affect. Her behavior is normal. Thought content normal.   Nursing note and vitals reviewed. Advice/Referrals/Counseling (as indicated)   Education and counseling provided for any problems identified above:     Preventive Services     (Preventive care checklist to be included in patient instructions)  Discussed today Done Previously Not Needed     x  Pneumococcal vaccines    x  Flu vaccine     x Hepatitis B vaccine (if at risk)    x  Shingles vaccine    x  TDAP vaccine     x Mammogram     x Pap smear     x Colorectal cancer screening     x Low-dose CT for lung cancer screening    x  Bone density test    x  Glaucoma screening    x  Cholesterol test    x  Diabetes screening test      x Diabetes self-management class     x Nutritionist referral for diabetes or renal disease     Discussion of Advance Directive   Discussed with Patricia Kahn her ability to prepare and advance directive in the case that an injury or illness causes her to be unable to make health care decisions. Assessment/Plan   Z00.00    ICD-10-CM ICD-9-CM    1. Medicare annual wellness visit, subsequent Z00.00 V70.0    2. Essential hypertension I10 401.9    3. Screening for depression Z13.89 V79.0    4. Screening for alcoholism Z13.89 V79.1        No orders of the defined types were placed in this encounter.       Patient Care Team:  Ramiro Dougherty MD as PCP - General    Follow-up Disposition:  Return in about 2 months (around 6/10/2018) for fasting. No future appointments.     Alejandra Barnes MD

## 2018-04-10 NOTE — MR AVS SNAPSHOT
[address] [place] [address] 
[phone] Patient: Amadeo Katz MRN: [id number] FDX:0[birth year] Visit Information Date & Time Provider Department Dept. Phone Encounter #  
 4/10/2018  3:05 PM Estevan Diaz, [address] [place], [place] [id number] Follow-up Instructions Return in about 2 months (around 6/10/2018) for fasting. Upcoming Health Maintenance Date Due  
 MEDICARE YEARLY EXAM 4/11/2019 GLAUCOMA SCREENING Q2Y 4/28/2019 DTaP/Tdap/Td series (2 - Td) 1/6/2027 Allergies as of 4/10/2018  Review Complete On: 4/10/2018 By: Estevan Diaz MD  
  
 Severity Noted Reaction Type Reactions Ace Inhibitors  05/24/2010    Other (comments) Cough. Also has unclear reaction to ARB's  
  
Current Immunizations  Reviewed on 2/25/2016 Name Date H1N1 FLU VACCINE 2/24/2010 Influenza High Dose Vaccine PF 9/1/2017 Influenza Vaccine 10/15/2015, 10/10/2014, 10/4/2013, 2/7/2013 Influenza Vaccine (Quad) PF 11/22/2016 Influenza Vaccine Split 10/14/2011, 10/6/2010 Influenza Vaccine Whole 12/6/2009 Pneumococcal Conjugate (PCV-13) 12/10/2015 TD Vaccine 6/21/2006 Tdap 1/6/2017 ZZZ-RETIRED (DO NOT USE) Pneumococcal Vaccine (Unspecified Type) 10/6/2008, 9/8/2003 Not reviewed this visit You Were Diagnosed With   
  
 Codes Comments Medicare annual wellness visit, subsequent    -  Primary ICD-10-CM: Z00.00 ICD-9-CM: V70.0 Essential hypertension     ICD-10-CM: I10 
ICD-9-CM: 401.9 Screening for depression     ICD-10-CM: Z13.89 ICD-9-CM: V79.0 Screening for alcoholism     ICD-10-CM: Z13.89 ICD-9-CM: V79.1 Vitals BP Pulse Temp Resp Height(growth percentile) Weight(growth percentile) 125/67 70 96.7 °F (35.9 °C) (Oral) 20 5' 3\" (1.6 m) 115 lb (52.2 kg) SpO2 BMI OB Status Smoking Status 98% 20.37 kg/m2 Hysterectomy Never Smoker Vitals History BMI and BSA Data Body Mass Index Body Surface Area  
 20.37 kg/m 2 1.52 m 2 Preferred Pharmacy Pharmacy Name Phone [address] [place] [address] [phone] Your Updated Medication List  
  
   
This list is accurate as of 4/10/18  3:44 PM.  Always use your most recent med list.  
  
  
  
  
 albuterol 90 mcg/actuation inhaler Commonly known as:  PROVENTIL HFA, VENTOLIN HFA, PROAIR HFA Take 2 Puffs by inhalation every six (6) hours as needed for Wheezing (use her insurance's generic). aspirin delayed-release 81 mg tablet Take 81 mg by mouth daily. felodipine 5 mg 24 hr tablet Commonly known as:  PLENDIL SR Take 1 Tab by mouth daily. fexofenadine 180 mg tablet Commonly known as:  Lennis Ng Take 1 Tab by mouth daily. For allergies. FISH OIL 1,000 mg Cap Generic drug:  omega-3 fatty acids-vitamin e Take 1 Cap by mouth. furosemide 40 mg tablet Commonly known as:  LASIX Take 1 Tab by mouth daily. hydrALAZINE 50 mg tablet Commonly known as:  APRESOLINE Take 1 Tab by mouth four (4) times daily. Indications: hypertension  
  
 levothyroxine 25 mcg tablet Commonly known as:  SYNTHROID Take 1 Tab by mouth Daily (before breakfast). losartan 100 mg tablet Commonly known as:  COZAAR Take 1 Tab by mouth daily. nitroglycerin 2 % ointment Commonly known as:  NITROBID Apply 0.5 Inches to affected area every four (4) hours. Apply 1/2 inch to the chest when systolic blood pressure is > 170. simvastatin 40 mg tablet Commonly known as:  ZOCOR Take 1 Tab by mouth nightly for 90 days. Indications: mixed hyperlipidemia VITAMIN B-12 1,000 mcg tablet Generic drug:  cyanocobalamin Take 1,000 mcg by mouth daily. VITAMIN D3 1,000 unit tablet Generic drug:  cholecalciferol Take 1,000 Units by mouth daily. Follow-up Instructions Return in about 2 months (around 6/10/2018) for fasting. Patient Instructions Well Visit, Over 72: Care Instructions Your Care Instructions Physical exams can help you stay healthy. Your doctor has checked your overall health and may have suggested ways to take good care of yourself. He or she also may have recommended tests. At home, you can help prevent illness with healthy eating, regular exercise, and other steps. Follow-up care is a key part of your treatment and safety. Be sure to make and go to all appointments, and call your doctor if you are having problems. It's also a good idea to know your test results and keep a list of the medicines you take. How can you care for yourself at home? · Reach and stay at a healthy weight. This will lower your risk for many problems, such as obesity, diabetes, heart disease, and high blood pressure. · Get at least 30 minutes of exercise on most days of the week. Walking is a good choice. You also may want to do other activities, such as running, swimming, cycling, or playing tennis or team sports. · Do not smoke. Smoking can make health problems worse. If you need help quitting, talk to your doctor about stop-smoking programs and medicines. These can increase your chances of quitting for good. · Protect your skin from too much sun. When you're outdoors from 10 a.m. to 4 p.m., stay in the shade or cover up with clothing and a hat with a wide brim. Wear sunglasses that block UV rays. Even when it's cloudy, put broad-spectrum sunscreen (SPF 30 or higher) on any exposed skin. · See a dentist one or two times a year for checkups and to have your teeth cleaned. · Wear a seat belt in the car. · Limit alcohol to 2 drinks a day for men and 1 drink a day for women. Too much alcohol can cause health problems. Follow your doctor's advice about when to have certain tests.  These tests can spot problems early. For men and women · Cholesterol. Your doctor will tell you how often to have this done based on your overall health and other things that can increase your risk for heart attack and stroke. · Blood pressure. Have your blood pressure checked during a routine doctor visit. Your doctor will tell you how often to check your blood pressure based on your age, your blood pressure results, and other factors. · Diabetes. Ask your doctor whether you should have tests for diabetes. · Vision. Experts recommend that you have yearly exams for glaucoma and other age-related eye problems. · Hearing. Tell your doctor if you notice any change in your hearing. You can have tests to find out how well you hear. · Colon cancer tests. Keep having colon cancer tests as your doctor recommends. You can have one of several types of tests. · Heart attack and stroke risk. At least every 4 to 6 years, you should have your risk for heart attack and stroke assessed. Your doctor uses factors such as your age, blood pressure, cholesterol, and whether you smoke or have diabetes to show what your risk for a heart attack or stroke is over the next 10 years. · Osteoporosis. Talk to your doctor about whether you should have a bone density test to find out whether you have thinning bones. Also ask your doctor about whether you should take calcium and vitamin D supplements. For women · Pap test and pelvic exam. You may no longer need a Pap test. Talk with your doctor about whether to stop or continue to have Pap tests. · Breast exam and mammogram. Ask how often you should have a mammogram, which is an X-ray of your breasts. A mammogram can spot breast cancer before it can be felt and when it is easiest to treat. · Thyroid disease. Talk to your doctor about whether to have your thyroid checked as part of a regular physical exam. Women have an increased chance of a thyroid problem. For men · Prostate exam. Talk to your doctor about whether you should have a blood test (called a PSA test) for prostate cancer. Experts disagree on whether men should have this test. Some experts recommend that you discuss the benefits and risks of the test with your doctor. · Abdominal aortic aneurysm. Ask your doctor whether you should have a test to check for an aneurysm. You may need a test if you ever smoked or if your parent, brother, sister, or child has had an aneurysm. When should you call for help? Watch closely for changes in your health, and be sure to contact your doctor if you have any problems or symptoms that concern you. Where can you learn more? Go to http://rafi-alejandro.info/. Enter V528 in the search box to learn more about \"Well Visit, Over 65: Care Instructions. \" Current as of: May 12, 2017 Content Version: 11.4 © 6349-1879 [employer]. Care instructions adapted under license by [employer]. (which disclaims liability or warranty for this information). If you have questions about a medical condition or this instruction, always ask your healthcare professional. [address] any warranty or liability for your use of this information. Please provide this summary of care documentation to your next provider. Your primary care clinician is listed as [address]. If you have any questions after today's visit, please call [phone].

## 2018-04-10 NOTE — PATIENT INSTRUCTIONS

## 2018-06-12 ENCOUNTER — OFFICE VISIT (OUTPATIENT)
Dept: FAMILY MEDICINE CLINIC | Age: 83
End: 2018-06-12

## 2018-06-12 VITALS
HEART RATE: 52 BPM | SYSTOLIC BLOOD PRESSURE: 183 MMHG | RESPIRATION RATE: 16 BRPM | TEMPERATURE: 97.5 F | HEIGHT: 63 IN | DIASTOLIC BLOOD PRESSURE: 53 MMHG | WEIGHT: 114.6 LBS | BODY MASS INDEX: 20.3 KG/M2 | OXYGEN SATURATION: 100 %

## 2018-06-12 DIAGNOSIS — I13.10 MALIGNANT HTN WITH HEART DISEASE, W/O CHF, WITH CHRONIC KIDNEY DISEASE: Primary | ICD-10-CM

## 2018-06-12 DIAGNOSIS — E03.4 HYPOTHYROIDISM DUE TO ACQUIRED ATROPHY OF THYROID: Chronic | ICD-10-CM

## 2018-06-12 DIAGNOSIS — E78.00 PURE HYPERCHOLESTEROLEMIA: Chronic | ICD-10-CM

## 2018-06-12 DIAGNOSIS — N18.30 CHRONIC RENAL IMPAIRMENT, STAGE 3 (MODERATE) (HCC): Chronic | ICD-10-CM

## 2018-06-12 DIAGNOSIS — F43.9 SITUATIONAL STRESS: Chronic | ICD-10-CM

## 2018-06-12 RX ORDER — LOSARTAN POTASSIUM 100 MG/1
100 TABLET ORAL DAILY
Qty: 90 TAB | Refills: 2 | Status: SHIPPED | OUTPATIENT
Start: 2018-06-12 | End: 2019-04-11 | Stop reason: SDUPTHER

## 2018-06-12 NOTE — PROGRESS NOTES
Chief Complaint   Patient presents with    Cholesterol Problem    Thyroid Problem    Labs     Fasting     Body mass index is 20.3 kg/(m^2). 1. Have you been to the ER, urgent care clinic since your last visit? Hospitalized since your last visit? No    2. Have you seen or consulted any other health care providers outside of the [address] [place] [address] since your last visit? Include any pap smears or colon screening. No    Reviewed record in preparation for visit and have necessary documentation  Pt did not bring medication to office visit for review  Information was given to pt on Advanced Directives, Living Will  Information was given on Shingles Vaccine  Opportunity was given for questions  Goals that were addressed and/or need to be completed after this appointment include: There are no preventive care reminders to display for this patient.

## 2018-06-12 NOTE — PROGRESS NOTES
Progress Note    Patient: Bucky Herring MRN: [id number]  SSN: [id number]    YOB: 1923  Age: 80 y.o. Sex: female        Chief Complaint   Patient presents with    Cholesterol Problem    Thyroid Problem    Labs     Fasting         Subjective:     Encounter Diagnoses   Name Primary? 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 Key CAD CHF Meds             losartan (COZAAR) 100 mg tablet  (Taking) Take 1 Tab by mouth daily. felodipine (PLENDIL SR) 5 mg 24 hr tablet  (Taking) Take 1 Tab by mouth daily. furosemide (LASIX) 40 mg tablet  (Taking) Take 1 Tab by mouth daily. hydrALAZINE (APRESOLINE) 50 mg tablet  (Taking) Take 1 Tab by mouth four (4) times daily. Indications: hypertension    omega-3 fatty acids-vitamin e (FISH OIL) 1,000 mg Cap  (Taking) Take 1 Cap by mouth. aspirin delayed-release 81 mg tablet  (Taking) Take 81 mg by mouth daily. simvastatin (ZOCOR) 40 mg tablet Take 1 Tab by mouth nightly for 90 days. Indications: mixed hyperlipidemia    nitroglycerin (NITROBID) 2 % ointment Apply 0.5 Inches to affected area every four (4) hours. Apply 1/2 inch to the chest when systolic blood pressure is > 170.            Lab Results   Component Value Date/Time    Sodium 143 08/21/2017 12:21 PM    Potassium 4.3 08/21/2017 12:21 PM    Chloride 102 08/21/2017 12:21 PM    CO2 25 08/21/2017 12:21 PM    Anion gap 10 10/06/2010 10:34 AM    Glucose 86 08/21/2017 12:21 PM    BUN 49 (H) 08/21/2017 12:21 PM    Creatinine 1.81 (H) 08/21/2017 12:21 PM    BUN/Creatinine ratio 27 08/21/2017 12:21 PM    GFR est AA 27 (L) 08/21/2017 12:21 PM    GFR est non-AA 24 (L) 08/21/2017 12:21 PM    Calcium 10.8 (H) 08/21/2017 12:21 PM    Bilirubin, total 0.5 08/21/2017 12:21 PM    AST (SGOT) 14 08/21/2017 12:21 PM    Alk. phosphatase 70 08/21/2017 12:21 PM    Protein, total 6.6 08/21/2017 12:21 PM    Albumin 4.4 08/21/2017 12:21 PM    Globulin 3.2 10/06/2010 10:34 AM    A-G Ratio 2.0 08/21/2017 12:21 PM    ALT (SGPT) 7 08/21/2017 12:21 PM     Low salt diet? yes  Aerobic exercise? Our goal is to normalize the blood pressure to decrease the risks of strokes and heart attacks. The patient is in agreement with the plan. Yes    Pure hypercholesterolemia:  Cardiovascular risks for her are: LDL goal is under 130  hypertension  hyperlipidemia. Key Antihyperlipidemia Meds             omega-3 fatty acids-vitamin e (FISH OIL) 1,000 mg Cap  (Taking) Take 1 Cap by mouth. simvastatin (ZOCOR) 40 mg tablet Take 1 Tab by mouth nightly for 90 days. Indications: mixed hyperlipidemia        Lab Results   Component Value Date/Time    Cholesterol, total 175 08/21/2017 12:21 PM    HDL Cholesterol 55 08/21/2017 12:21 PM    LDL, calculated 97 08/21/2017 12:21 PM    Triglyceride 116 08/21/2017 12:21 PM    CHOL/HDL Ratio 3.3 10/06/2010 10:34 AM     Lab Results   Component Value Date/Time    ALT (SGPT) 7 08/21/2017 12:21 PM    AST (SGOT) 14 08/21/2017 12:21 PM    Alk. phosphatase 70 08/21/2017 12:21 PM    Bilirubin, total 0.5 08/21/2017 12:21 PM      Myalgias: No   Fatigue: No   Other side effects: no  Wt Readings from Last 3 Encounters:   06/12/18 114 lb 9.6 oz (52 kg)   04/10/18 115 lb (52.2 kg)   08/21/17 120 lb (54.4 kg)     The patient is aware of our goal to reduce or eliminate the long term problems (such as strokes and heart attacks) related to poorly controlled hyperlipidemia.          Chronic renal impairment, stage 4 (moderate):  Nephrologist:   Lab Results   Component Value Date/Time    GFR est AA 27 (L) 08/21/2017 12:21 PM    GFR est non-AA 24 (L) 08/21/2017 12:21 PM    Creatinine 1.81 (H) 08/21/2017 12:21 PM    BUN 49 (H) 08/21/2017 12:21 PM    Sodium 143 08/21/2017 12:21 PM    Potassium 4.3 08/21/2017 12:21 PM    Chloride 102 08/21/2017 12:21 PM    CO2 25 08/21/2017 12:21 PM            Hypothyroidism due to acquired atrophy of thyroid:  Lab Results   Component Value Date/Time    TSH 2.470 08/21/2017 12:21 PM    T4, Free 1.30 11/22/2016 10:34 AM      Denies fatigue, nervousness,weight changes, heat orcold intolerance, bowel changes,skin changes, cardiovascular symptoms, hair loss, feeling excessive energy, tremor, palpitations and weight loss. Thyroid medication has been unchanged since last medication check and labs.  Situational stress: Alcoholic son is living with her causing undue stress for someone her age. She says she will go anywhere. She feels like she is taking care of him. Current and past medical information:    Current Medications after this visit[de-identified]     Current Outpatient Prescriptions   Medication Sig    losartan (COZAAR) 100 mg tablet Take 1 Tab by mouth daily.  felodipine (PLENDIL SR) 5 mg 24 hr tablet Take 1 Tab by mouth daily.  furosemide (LASIX) 40 mg tablet Take 1 Tab by mouth daily.  hydrALAZINE (APRESOLINE) 50 mg tablet Take 1 Tab by mouth four (4) times daily. Indications: hypertension    levothyroxine (SYNTHROID) 25 mcg tablet Take 1 Tab by mouth Daily (before breakfast).  albuterol (PROVENTIL HFA, VENTOLIN HFA, PROAIR HFA) 90 mcg/actuation inhaler Take 2 Puffs by inhalation every six (6) hours as needed for Wheezing (use her insurance's generic).  fexofenadine (ALLEGRA) 180 mg tablet Take 1 Tab by mouth daily. For allergies.  omega-3 fatty acids-vitamin e (FISH OIL) 1,000 mg Cap Take 1 Cap by mouth.  aspirin delayed-release 81 mg tablet Take 81 mg by mouth daily.  cholecalciferol, vitamin d3, (VITAMIN D) 1,000 unit tablet Take 1,000 Units by mouth daily.  cyanocobalamin (VITAMIN B-12) 1,000 mcg tablet Take 1,000 mcg by mouth daily.     simvastatin (ZOCOR) 40 mg tablet Take 1 Tab by mouth nightly for 90 days. Indications: mixed hyperlipidemia    nitroglycerin (NITROBID) 2 % ointment Apply 0.5 Inches to affected area every four (4) hours. Apply 1/2 inch to the chest when systolic blood pressure is > 170. No current facility-administered medications for this visit. 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 Also has unclear reaction to ARB's       Past Surgical History:   Procedure Laterality Date    HX GYN      hysterectomy,btl    HX GYN  9/6/11    complete hysterectomy    HX HEENT  10/10. bilateral cataract removal.       Social History     Social History    Marital status:      Spouse name: N/A    Number of children: N/A    Years of education: N/A     Social History Main Topics    Smoking status: Never Smoker    Smokeless tobacco: Never Used    Alcohol use No    Drug use: No    Sexual activity: Not Asked     Other Topics Concern    None     Social History Narrative       Review of Systems   Constitutional: Negative. Negative for chills, fever, malaise/fatigue and weight loss. HENT: Negative. Negative for hearing loss. Eyes: Negative. Negative for blurred vision and double vision. Respiratory: Negative. Negative for cough, hemoptysis, sputum production and shortness of breath. Cardiovascular: Negative. Negative for chest pain, palpitations and orthopnea. Gastrointestinal: Negative. Negative for abdominal pain, blood in stool, heartburn, nausea and vomiting. Genitourinary: Negative. Negative for dysuria, frequency and urgency. Musculoskeletal: Negative. Negative for back pain, myalgias and neck pain. Skin: Negative. Negative for rash. Neurological: Negative. Negative for dizziness, tingling, tremors, weakness and headaches. Endo/Heme/Allergies: Negative. Psychiatric/Behavioral: Negative. Negative for depression. Situational stress. Objective:     Vitals:    06/12/18 0939 06/12/18 0957 06/12/18 0958   BP: 183/61 165/53 183/53   Pulse: (!) 54 (!) 54 (!) 52   Resp: 16     Temp: 97.5 °F (36.4 °C)     TempSrc: Oral     SpO2: 100%     Weight: 114 lb 9.6 oz (52 kg)     Height: 5' 3\" (1.6 m)        Body mass index is 20.3 kg/(m^2). Physical Exam   Constitutional: She is oriented to person, place, and time and well-developed, well-nourished, and in no distress. No distress. HENT:   Head: Normocephalic and atraumatic. Mouth/Throat: Oropharynx is clear and moist.   Eyes: Conjunctivae are normal.   Neck: No tracheal deviation present. No thyromegaly present. Cardiovascular: Normal rate and regular rhythm. Murmur heard. 3/6 murmur right precordium   Pulmonary/Chest: Effort normal and breath sounds normal. No respiratory distress. Abdominal: Soft. She exhibits no distension. Lymphadenopathy:     She has no cervical adenopathy. Neurological: She is alert and oriented to person, place, and time. Skin: Skin is warm. No rash noted. She is not diaphoretic. No erythema. Psychiatric: Mood and affect normal.   Nursing note and vitals reviewed. There are no preventive care reminders to display for this patient.       Assessment and orders:     Encounter Diagnoses ICD-10-CM ICD-9-CM   1. 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 Pure hypercholesterolemia E78.00 272.0   3. Chronic renal impairment, stage 3 (moderate) N18.3 585.3   4. Hypothyroidism due to acquired atrophy of thyroid E03.4 244.8     246.8   5. Situational stress F43.9 V62.89     Diagnoses and all orders for this visit:    1. 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 She has had spells of weakness which may represent low blood pressure himself told her recheck her blood pressure will not happens. Assessment & Plan:  Labile hypertension, not well controlled. Orders:  -     losartan (COZAAR) 100 mg tablet; Take 1 Tab by mouth daily.  -     LIPID PANEL  -     METABOLIC PANEL, COMPREHENSIVE  -     T4, FREE  -     TSH 3RD GENERATION  -     PROT+CREATU (RANDOM)  -     HEMOGLOBIN    2. Pure hypercholesterolemia-recheck labs  -     LIPID PANEL  -     METABOLIC PANEL, COMPREHENSIVE    3. Chronic renal impairment, stage 3 (moderate)-recheck labs  Assessment & Plan:  Stage IV    Orders:  -     LIPID PANEL  -     METABOLIC PANEL, COMPREHENSIVE  -     PROT+CREATU (RANDOM)  -     HEMOGLOBIN    4. Hypothyroidism due to acquired atrophy of thyroid-recheck labs  Assessment & Plan:  Stable    Orders:  -     T4, FREE  -     TSH 3RD GENERATION    5. Situational stress-her alcoholic son is staying with her. This is creating a lot of stress for her. Plan of care:  Discussed diagnoses in detail with patient. Medication risks/benefits/side effects discussed with patient. All of the patient's questions were addressed. The patient understands and agrees with our plan of care. The patient knows to call back if they are unsure of or forget any changes we discussed today or if the symptoms change.      The patient received an After-Visit Summary which contains VS, orders, medication list and allergy list. This can be used as a \"mini-medical record\" should they have to seek medical care while out of town. Patient Care Team:  Vivienne Guidry MD as PCP - General    Follow-up Disposition:  Return in about 3 weeks (around 7/3/2018) for BP check. No future appointments.     Signed By: Vivienne Guidry MD     June 12, 2018

## 2018-06-12 NOTE — MR AVS SNAPSHOT
[address] [address] 
[phone] Patient: Efren Alvarenga MRN: [id number] TQV:[id number]/[id number] Visit Information Date & Time Provider Department Dept. Phone Encounter #  
 6/12/2018  9:50 AM Cuco Zayas MD [address] [place] [address] [id number] Follow-up Instructions Return in about 3 weeks (around 7/3/2018) for BP check. Upcoming Health Maintenance Date Due Influenza Age 5 to Adult 8/1/2018 MEDICARE YEARLY EXAM 4/11/2019 GLAUCOMA SCREENING Q2Y 3/20/2020 DTaP/Tdap/Td series (2 - Td) 1/6/2027 Allergies as of 6/12/2018  Review Complete On: 4/10/2018 By: Cuco Zayas MD  
  
 Severity Noted Reaction Type Reactions Ace Inhibitors  05/24/2010    Other (comments) Cough. Also has unclear reaction to ARB's  
  
Current Immunizations  Reviewed on 2/25/2016 Name Date H1N1 FLU VACCINE 2/24/2010 Influenza High Dose Vaccine PF 9/1/2017 Influenza Vaccine 10/15/2015, 10/10/2014, 10/4/2013, 2/7/2013 Influenza Vaccine (Quad) PF 11/22/2016 Influenza Vaccine Split 10/14/2011, 10/6/2010 Influenza Vaccine Whole 12/6/2009 Pneumococcal Conjugate (PCV-13) 12/10/2015 TD Vaccine 6/21/2006 Tdap 1/6/2017 ZZZ-RETIRED (DO NOT USE) Pneumococcal Vaccine (Unspecified Type) 10/6/2008, 9/8/2003 Not reviewed this visit You Were Diagnosed With   
  
 Codes Comments Malignant HTN with heart disease, w/o CHF, with chronic kidney disease    -  Primary ICD-10-CM: I13.10 ICD-9-CM: 404.00 Pure hypercholesterolemia     ICD-10-CM: E78.00 ICD-9-CM: 272.0 Chronic renal impairment, stage 3 (moderate)     ICD-10-CM: N18.3 ICD-9-CM: 290. 3 Hypothyroidism due to acquired atrophy of thyroid     ICD-10-CM: E03.4 ICD-9-CM: 244.8, 246.8 Situational stress     ICD-10-CM: F43.9 ICD-9-CM: V62.89 Vitals BP Pulse Temp Resp Height(growth percentile) Weight(growth percentile) 165/53 (BP 1 Location: Left arm, BP Patient Position: Sitting) (!) 54 97.5 °F (36.4 °C) (Oral) 16 5' 3\" (1.6 m) 114 lb 9.6 oz (52 kg) SpO2 BMI OB Status Smoking Status 100% 20.3 kg/m2 Hysterectomy Never Smoker Vitals History BMI and BSA Data Body Mass Index Body Surface Area  
 20.3 kg/m 2 1.52 m 2 Preferred Pharmacy Pharmacy Name Phone [address] [facility] [place] [address] [phone] Your Updated Medication List  
  
   
This list is accurate as of 6/12/18  9:58 AM.  Always use your most recent med list.  
  
  
  
  
 albuterol 90 mcg/actuation inhaler Commonly known as:  PROVENTIL HFA, VENTOLIN HFA, PROAIR HFA Take 2 Puffs by inhalation every six (6) hours as needed for Wheezing (use her insurance's generic). aspirin delayed-release 81 mg tablet Take 81 mg by mouth daily. felodipine 5 mg 24 hr tablet Commonly known as:  PLENDIL SR Take 1 Tab by mouth daily. fexofenadine 180 mg tablet Commonly known as:  Lotus Tre Take 1 Tab by mouth daily. For allergies. FISH OIL 1,000 mg Cap Generic drug:  omega-3 fatty acids-vitamin e Take 1 Cap by mouth. furosemide 40 mg tablet Commonly known as:  LASIX Take 1 Tab by mouth daily. hydrALAZINE 50 mg tablet Commonly known as:  APRESOLINE Take 1 Tab by mouth four (4) times daily. Indications: hypertension  
  
 levothyroxine 25 mcg tablet Commonly known as:  SYNTHROID Take 1 Tab by mouth Daily (before breakfast). losartan 100 mg tablet Commonly known as:  COZAAR Take 1 Tab by mouth daily. nitroglycerin 2 % ointment Commonly known as:  NITROBID Apply 0.5 Inches to affected area every four (4) hours. Apply 1/2 inch to the chest when systolic blood pressure is > 170. simvastatin 40 mg tablet Commonly known as:  ZOCOR  
 Take 1 Tab by mouth nightly for 90 days. Indications: mixed hyperlipidemia VITAMIN B-12 1,000 mcg tablet Generic drug:  cyanocobalamin Take 1,000 mcg by mouth daily. VITAMIN D3 1,000 unit tablet Generic drug:  cholecalciferol Take 1,000 Units by mouth daily. Prescriptions Sent to Pharmacy Refills  
 losartan (COZAAR) 100 mg tablet 2 Sig: Take 1 Tab by mouth daily. Class: Normal  
 Pharmacy: [facility] [address] [place] [address] #: [phone] Route: Oral  
  
We Performed the Following HEMOGLOBIN [id number] CPT(R)] LIPID PANEL [12296 CPT(R)] METABOLIC PANEL, COMPREHENSIVE [72549 CPT(R)] PROT+CREATU (RANDOM) [id number] CPT(R)] T4, FREE [id number] CPT(R)] TSH 3RD GENERATION [28223 CPT(R)] Follow-up Instructions Return in about 3 weeks (around 7/3/2018) for BP check. Patient Instructions Hypothyroidism: Care Instructions Your Care Instructions You have hypothyroidism, which means that your body is not making enough thyroid hormone. This hormone helps your body use energy. If your thyroid level is low, you may feel tired, be constipated, have an increase in your blood pressure, or have dry skin or memory problems. You may also get cold easily, even when it is warm. Women with low thyroid levels may have heavy menstrual periods. A blood test to find your thyroid-stimulating hormone (TSH) level is used to check for hypothyroidism. A high TSH level may mean that you have low thyroid. When your body is not making enough thyroid hormone, TSH levels rise in an effort to make the body produce more. The treatment for hypothyroidism is to take thyroid hormone pills. You should start to feel better in 1 to 2 weeks. But it can take several months to see changes in the TSH level. You will need regular visits with your doctor to make sure you have the right dose of medicine. Most people need treatment for the rest of their lives. You will need to see your doctor regularly to have blood tests and to make sure you are doing well. Follow-up care is a key part of your treatment and safety. Be sure to make and go to all appointments, and call your doctor if you are having problems. It's also a good idea to know your test results and keep a list of the medicines you take. How can you care for yourself at home? · Take your thyroid hormone medicine exactly as prescribed. Call your doctor if you think you are having a problem with your medicine. Most people do not have side effects if they take the right amount of medicine regularly. ¨ Take the medicine 30 minutes before breakfast, and do not take it with calcium, vitamins, or iron. ¨ Do not take extra doses of your thyroid medicine. It will not help you get better any faster, and it may cause side effects. ¨ If you forget to take a dose, do NOT take a double dose of medicine. Take your usual dose the next day. · Tell your doctor about all prescription, herbal, or over-the-counter products you take. · Take care of yourself. Eat a healthy diet, get enough sleep, and get regular exercise. When should you call for help? Call 911 anytime you think you may need emergency care. For example, call if: 
? · You passed out (lost consciousness). ? · You have severe trouble breathing. ? · You have a very slow heartbeat (less than 60 beats a minute). ? · You have a low body temperature (95°F or below). ?Call your doctor now or seek immediate medical care if: 
? · You feel tired, sluggish, or weak. ? · You have trouble remembering things or concentrating. ? · You do not begin to feel better 2 weeks after starting your medicine. ? Watch closely for changes in your health, and be sure to contact your doctor if you have any problems. Where can you learn more? Go to http://rafi-alejandro.info/. Enter L585 in the search box to learn more about \"Hypothyroidism: Care Instructions. \" Current as of: May 12, 2017 Content Version: 11.4 © 3879-6232 [employer], [employer]. Care instructions adapted under license by [employer] (which disclaims liability or warranty for this information). If you have questions about a medical condition or this instruction, always ask your healthcare professional. [address] any warranty or liability for your use of this information. [address] [facility] with [facility] [address] [place] [address] 
[phone] Monitor blood pressure outside the office several times weekly at different times during the day and evening. Bring the record to me in 3 weeks for review. Blood Pressure Record Patient Name:  ______________________ :  ______________________ Date/Time BP Reading Pulse Please provide this summary of care documentation to your next provider. Your primary care clinician is listed as [address]. If you have any questions after today's visit, please call [phone].

## 2018-06-12 NOTE — PATIENT INSTRUCTIONS
Hypothyroidism: Care Instructions  Your Care Instructions    You have hypothyroidism, which means that your body is not making enough thyroid hormone. This hormone helps your body use energy. If your thyroid level is low, you may feel tired, be constipated, have an increase in your blood pressure, or have dry skin or memory problems. You may also get cold easily, even when it is warm. Women with low thyroid levels may have heavy menstrual periods. A blood test to find your thyroid-stimulating hormone (TSH) level is used to check for hypothyroidism. A high TSH level may mean that you have low thyroid. When your body is not making enough thyroid hormone, TSH levels rise in an effort to make the body produce more. The treatment for hypothyroidism is to take thyroid hormone pills. You should start to feel better in 1 to 2 weeks. But it can take several months to see changes in the TSH level. You will need regular visits with your doctor to make sure you have the right dose of medicine. Most people need treatment for the rest of their lives. You will need to see your doctor regularly to have blood tests and to make sure you are doing well. Follow-up care is a key part of your treatment and safety. Be sure to make and go to all appointments, and call your doctor if you are having problems. It's also a good idea to know your test results and keep a list of the medicines you take. How can you care for yourself at home? · Take your thyroid hormone medicine exactly as prescribed. Call your doctor if you think you are having a problem with your medicine. Most people do not have side effects if they take the right amount of medicine regularly. ¨ Take the medicine 30 minutes before breakfast, and do not take it with calcium, vitamins, or iron. ¨ Do not take extra doses of your thyroid medicine. It will not help you get better any faster, and it may cause side effects.   ¨ If you forget to take a dose, do NOT take a double dose of medicine. Take your usual dose the next day. · Tell your doctor about all prescription, herbal, or over-the-counter products you take. · Take care of yourself. Eat a healthy diet, get enough sleep, and get regular exercise. When should you call for help? Call 911 anytime you think you may need emergency care. For example, call if:  ? · You passed out (lost consciousness). ? · You have severe trouble breathing. ? · You have a very slow heartbeat (less than 60 beats a minute). ? · You have a low body temperature (95°F or below). ?Call your doctor now or seek immediate medical care if:  ? · You feel tired, sluggish, or weak. ? · You have trouble remembering things or concentrating. ? · You do not begin to feel better 2 weeks after starting your medicine. ? Watch closely for changes in your health, and be sure to contact your doctor if you have any problems. Where can you learn more? Go to http://rafi-alejandro.info/. Enter M109 in the search box to learn more about \"Hypothyroidism: Care Instructions. \"  Current as of: May 12, 2017  Content Version: 11.4  © 8299-0005 [employer]. Care instructions adapted under license by [employer] (which disclaims liability or warranty for this information). If you have questions about a medical condition or this instruction, always ask your healthcare professional. [address] any warranty or liability for your use of this information. Billy Simpson with [facility]  [address] [place] [address]  [phone]    Monitor blood pressure outside the office several times weekly at different times during the day and evening. Bring the record to me in 3 weeks for review.     Blood Pressure Record     Patient Name:  ______________________ :  ______________________    Date/Time BP Reading Pulse

## 2018-06-13 LAB
ALBUMIN SERPL-MCNC: 4.6 G/DL (ref 3.2–4.6)
ALBUMIN/GLOB SERPL: 1.7 {RATIO} (ref 1.2–2.2)
ALP SERPL-CCNC: 77 IU/L (ref 39–117)
ALT SERPL-CCNC: 7 IU/L (ref 0–32)
AST SERPL-CCNC: 13 IU/L (ref 0–40)
BILIRUB SERPL-MCNC: 0.4 MG/DL (ref 0–1.2)
BUN SERPL-MCNC: 58 MG/DL (ref 10–36)
BUN/CREAT SERPL: 27 (ref 12–28)
CALCIUM SERPL-MCNC: 10.9 MG/DL (ref 8.7–10.3)
CHLORIDE SERPL-SCNC: 102 MMOL/L (ref 96–106)
CHOLEST SERPL-MCNC: 203 MG/DL (ref 100–199)
CO2 SERPL-SCNC: 22 MMOL/L (ref 20–29)
CREAT SERPL-MCNC: 2.13 MG/DL (ref 0.57–1)
CREAT UR-MCNC: 71.6 MG/DL
GFR SERPLBLD CREATININE-BSD FMLA CKD-EPI: 19 ML/MIN/1.73
GFR SERPLBLD CREATININE-BSD FMLA CKD-EPI: 22 ML/MIN/1.73
GLOBULIN SER CALC-MCNC: 2.7 G/DL (ref 1.5–4.5)
GLUCOSE SERPL-MCNC: 78 MG/DL (ref 65–99)
HDLC SERPL-MCNC: 71 MG/DL
HGB BLD-MCNC: 11.4 G/DL (ref 11.1–15.9)
INTERPRETATION: NORMAL
LDLC SERPL CALC-MCNC: 110 MG/DL (ref 0–99)
POTASSIUM SERPL-SCNC: 4.2 MMOL/L (ref 3.5–5.2)
PROT SERPL-MCNC: 7.3 G/DL (ref 6–8.5)
PROT UR-MCNC: 23.1 MG/DL
PROT/CREAT UR: 323 MG/G CREAT (ref 0–200)
SODIUM SERPL-SCNC: 145 MMOL/L (ref 134–144)
T4 FREE SERPL-MCNC: 1.18 NG/DL (ref 0.82–1.77)
TRIGL SERPL-MCNC: 110 MG/DL (ref 0–149)
TSH SERPL DL<=0.005 MIU/L-ACNC: 3.45 UIU/ML (ref 0.45–4.5)
VLDLC SERPL CALC-MCNC: 22 MG/DL (ref 5–40)

## 2018-07-11 ENCOUNTER — OFFICE VISIT (OUTPATIENT)
Dept: FAMILY MEDICINE CLINIC | Age: 83
End: 2018-07-11

## 2018-07-11 VITALS
SYSTOLIC BLOOD PRESSURE: 143 MMHG | HEIGHT: 63 IN | HEART RATE: 60 BPM | WEIGHT: 113 LBS | OXYGEN SATURATION: 100 % | BODY MASS INDEX: 20.02 KG/M2 | TEMPERATURE: 97.7 F | RESPIRATION RATE: 18 BRPM | DIASTOLIC BLOOD PRESSURE: 55 MMHG

## 2018-07-11 DIAGNOSIS — H61.23 CERUMEN DEBRIS ON TYMPANIC MEMBRANE OF BOTH EARS: ICD-10-CM

## 2018-07-11 DIAGNOSIS — E78.00 PURE HYPERCHOLESTEROLEMIA: Chronic | ICD-10-CM

## 2018-07-11 DIAGNOSIS — Z86.73 HX-TIA (TRANSIENT ISCHEMIC ATTACK): ICD-10-CM

## 2018-07-11 DIAGNOSIS — R26.81 UNSTEADY GAIT: ICD-10-CM

## 2018-07-11 DIAGNOSIS — I13.10 MALIGNANT HTN WITH HEART DISEASE, W/O CHF, WITH CHRONIC KIDNEY DISEASE: Primary | Chronic | ICD-10-CM

## 2018-07-11 DIAGNOSIS — N18.30 CHRONIC RENAL IMPAIRMENT, STAGE 3 (MODERATE) (HCC): Chronic | ICD-10-CM

## 2018-07-11 DIAGNOSIS — M19.90 ARTHRITIS: Chronic | ICD-10-CM

## 2018-07-11 DIAGNOSIS — E03.4 HYPOTHYROIDISM DUE TO ACQUIRED ATROPHY OF THYROID: Chronic | ICD-10-CM

## 2018-07-11 NOTE — MR AVS SNAPSHOT
Cali Salas 
 
 
  [place] [id number] [address] 
[phone] Patient: Julián Bonilla MRN: [id number] [id number] Visit Information Date & Time Provider Department Dept. Phone Encounter #  
 2018  9:35 AM Damien Gomez MD [address] [place], [place] [id number] Follow-up Instructions Return in about 1 month (around 2018) for Dr. Annmarie Mendez next week for hearing. Nora Gearing Your Appointments 2018  9:35 AM  
ROUTINE CARE with Damein Gomez MD  
[address]) Appt Note: 3 weeks  for BP checkup/Hearing referral to [facility] [address] [place] [id number] [address]  
[place] [address] [address] Upcoming Health Maintenance Date Due Influenza Age 5 to Adult 2018 MEDICARE YEARLY EXAM 2019 GLAUCOMA SCREENING Q2Y 3/20/2020 DTaP/Tdap/Td series (2 - Td) 2027 Allergies as of 2018  Review Complete On: 2018 By: Dori Choe LPN Severity Noted Reaction Type Reactions Ace Inhibitors  2010    Other (comments) Cough. Also has unclear reaction to ARB's  
  
Current Immunizations  Reviewed on 2016 Name Date H1N1 FLU VACCINE 2010 Influenza High Dose Vaccine PF 2017 Influenza Vaccine 10/15/2015, 10/10/2014, 10/4/2013, 2013 Influenza Vaccine (Quad) PF 2016 Influenza Vaccine Split 10/14/2011, 10/6/2010 Influenza Vaccine Whole 2009 Pneumococcal Conjugate (PCV-13) 12/10/2015 TD Vaccine 2006 Tdap 2017 ZZZ-RETIRED (DO NOT USE) Pneumococcal Vaccine (Unspecified Type) 10/6/2008, 2003 Not reviewed this visit You Were Diagnosed With   
  
 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   
 Pure hypercholesterolemia     ICD-10-CM: E78.00 ICD-9-CM: 272.0 Hypothyroidism due to acquired atrophy of thyroid     ICD-10-CM: E03.4 ICD-9-CM: 244.8, 246.8 Chronic renal impairment, stage 3 (moderate)     ICD-10-CM: N18.3 ICD-9-CM: 749. 3 Unsteady gait     ICD-10-CM: R26.81 
ICD-9-CM: 692. 2 Cerumen debris on tympanic membrane of both ears     ICD-10-CM: H61.23 
ICD-9-CM: 380.4 Vitals BP Pulse Temp Resp Height(growth percentile) Weight(growth percentile) 162/54 (!) 56 97.7 °F (36.5 °C) (Oral) 18 5' 3\" (1.6 m) 113 lb (51.3 kg) SpO2 BMI OB Status Smoking Status 100% 20.02 kg/m2 Hysterectomy Never Smoker Vitals History BMI and BSA Data Body Mass Index Body Surface Area 20.02 kg/m 2 1.51 m 2 Preferred Pharmacy Pharmacy Name Phone [facility] [address] [place] [address] [phone] Your Updated Medication List  
  
   
This list is accurate as of 7/11/18  9:25 AM.  Always use your most recent med list.  
  
  
  
  
 albuterol 90 mcg/actuation inhaler Commonly known as:  PROVENTIL HFA, VENTOLIN HFA, PROAIR HFA Take 2 Puffs by inhalation every six (6) hours as needed for Wheezing (use her insurance's generic). aspirin delayed-release 81 mg tablet Take 81 mg by mouth daily. felodipine 5 mg 24 hr tablet Commonly known as:  PLENDIL SR Take 1 Tab by mouth daily. fexofenadine 180 mg tablet Commonly known as:  Parul Peat Take 1 Tab by mouth daily. For allergies. FISH OIL 1,000 mg Cap Generic drug:  omega-3 fatty acids-vitamin e Take 1 Cap by mouth. furosemide 40 mg tablet Commonly known as:  LASIX Take 1 Tab by mouth daily. hydrALAZINE 50 mg tablet Commonly known as:  APRESOLINE Take 1 Tab by mouth four (4) times daily. Indications: hypertension  
  
 levothyroxine 25 mcg tablet Commonly known as:  SYNTHROID Take 1 Tab by mouth Daily (before breakfast). losartan 100 mg tablet Commonly known as:  COZAAR Take 1 Tab by mouth daily. nitroglycerin 2 % ointment Commonly known as:  NITROBID Apply 0.5 Inches to affected area every four (4) hours. Apply 1/2 inch to the chest when systolic blood pressure is > 170. simvastatin 40 mg tablet Commonly known as:  ZOCOR Take 1 Tab by mouth nightly for 90 days. Indications: mixed hyperlipidemia VITAMIN B-12 1,000 mcg tablet Generic drug:  cyanocobalamin Take 1,000 mcg by mouth daily. VITAMIN D3 1,000 unit tablet Generic drug:  cholecalciferol Take 1,000 Units by mouth daily. We Performed the Following HEMOGLOBIN [id number] CPT(R)] LIPID PANEL [57928 CPT(R)] METABOLIC PANEL, COMPREHENSIVE [65696 CPT(R)] T4, FREE E8419709 CPT(R)] Follow-up Instructions Return in about 1 month (around 2018) for Dr. Madison Sierra next week for hearing. Jennifer Kennedy Patient Instructions [address] [address] [facility] [address] [place] [address] 
[phone] Monitor blood pressure outside the office several times weekly at different times during the day and evening. Bring the record to me in 3 weeks for review. Blood Pressure Record Patient Name:  ______________________ :  ______________________ Date/Time BP Reading Pulse Please provide this summary of care documentation to your next provider. Your primary care clinician is listed as [address]. If you have any questions after today's visit, please call [phone].

## 2018-07-11 NOTE — PROGRESS NOTES
Progress Note    Patient: Willy Lackey MRN: [id number]  SSN: [id number]    YOB: 1923  Age: 80 y.o. Sex: female        Chief Complaint   Patient presents with    Hypertension    Labs     Fasting    Difficulty Walking         Subjective:     Encounter Diagnoses   Name Primary? 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  felodipine (PLENDIL SR) 5 mg 24 hr tablet Take 1 Tab by mouth daily.  furosemide (LASIX) 40 mg tablet Take 1 Tab by mouth daily.  hydrALAZINE (APRESOLINE) 50 mg tablet Take 1 Tab by mouth four (4) times daily. Indications: hypertension    nitroglycerin (NITROBID) 2 % ointment Apply 0.5 Inches to affected area every four (4) hours. Apply 1/2 inch to the chest when systolic blood pressure is > 170.  levothyroxine (SYNTHROID) 25 mcg tablet Take 1 Tab by mouth Daily (before breakfast).  albuterol (PROVENTIL HFA, VENTOLIN HFA, PROAIR HFA) 90 mcg/actuation inhaler Take 2 Puffs by inhalation every six (6) hours as needed for Wheezing (use her insurance's generic).  fexofenadine (ALLEGRA) 180 mg tablet Take 1 Tab by mouth daily. For allergies.  omega-3 fatty acids-vitamin e (FISH OIL) 1,000 mg Cap Take 1 Cap by mouth.  aspirin delayed-release 81 mg tablet Take 81 mg by mouth daily.  cholecalciferol, vitamin d3, (VITAMIN D) 1,000 unit tablet Take 1,000 Units by mouth daily.  cyanocobalamin (VITAMIN B-12) 1,000 mcg tablet Take 1,000 mcg by mouth daily.     simvastatin (ZOCOR) 40 mg tablet Take 1 Tab by mouth nightly for 90 days. Indications: mixed hyperlipidemia     No current facility-administered medications for this visit. 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 Also has unclear reaction to ARB's       Past Surgical History:   Procedure Laterality Date    HX GYN      hysterectomy,btl    HX GYN  9/6/11    complete hysterectomy    HX HEENT  10/10. bilateral cataract removal.       Social History     Social History    Marital status:      Spouse name: N/A    Number of children: N/A    Years of education: N/A     Social History Main Topics    Smoking status: Never Smoker    Smokeless tobacco: Never Used    Alcohol use No    Drug use: No    Sexual activity: Not Asked     Other Topics Concern    None     Social History Narrative       Review of Systems   Constitutional: Negative. Negative for chills, fever, malaise/fatigue and weight loss. HENT: Positive for hearing loss. Eyes: Negative. Negative for blurred vision and double vision. Respiratory: Negative. Negative for cough, hemoptysis, sputum production and shortness of breath. Cardiovascular: Negative. Negative for chest pain, palpitations and orthopnea. Gastrointestinal: Negative.   Negative for abdominal pain, blood in stool, heartburn, nausea and vomiting. Genitourinary: Negative. Negative for dysuria, frequency and urgency. Musculoskeletal: Negative. Negative for back pain, myalgias and neck pain. Skin: Negative. Negative for rash. Neurological: Negative. Negative for dizziness, tingling, tremors, weakness and headaches. Transient unsteadiness when walking. Probably due to orthostasis. This occurred only one time. No evidence for acute neurologic problem. Endo/Heme/Allergies: Negative. Psychiatric/Behavioral: Negative. Negative for depression. Objective:     Vitals:    07/11/18 0858 07/11/18 0938   BP: 162/54 143/55   Pulse: (!) 56 60   Resp: 18    Temp: 97.7 °F (36.5 °C)    TempSrc: Oral    SpO2: 100%    Weight: 113 lb (51.3 kg)    Height: 5' 3\" (1.6 m)       Body mass index is 20.02 kg/(m^2). Physical Exam   Constitutional: She is oriented to person, place, and time and well-developed, well-nourished, and in no distress. No distress. HENT:   Head: Normocephalic and atraumatic. Mouth/Throat: Oropharynx is clear and moist.   Eyes: Conjunctivae are normal.   Neck: No tracheal deviation present. No thyromegaly present. Cardiovascular: Normal rate, regular rhythm and normal heart sounds. No murmur heard. Pulmonary/Chest: Effort normal and breath sounds normal. No respiratory distress. Abdominal: Soft. She exhibits no distension. Lymphadenopathy:     She has no cervical adenopathy. Neurological: She is alert and oriented to person, place, and time. Negative Hallpike maneuver. Cerebellar testing including Romberg and finger to nose was normal.   Skin: Skin is warm. No rash noted. She is not diaphoretic. No erythema. Psychiatric: Mood and affect normal.   Nursing note and vitals reviewed. There are no preventive care reminders to display for this patient.       Assessment and orders:     Encounter Diagnoses     ICD-10-CM ICD-9-CM   1. 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 Pure hypercholesterolemia E78.00 272.0   3. Hypothyroidism due to acquired atrophy of thyroid E03.4 244.8     246.8   4. Chronic renal impairment, stage 3 (moderate) N18.3 585.3   5. Unsteady gait R26.81 781.2   6. Cerumen debris on tympanic membrane of both ears H61.23 380.4     Diagnoses and all orders for this visit:    1. 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 Her blood pressure fell 30 points to 143/ 55 when she stood. She did not have any symptoms with this however this could be contributing to her unsteadiness. I have asked her to take all of her blood pressures while standing. If the unsteadiness gets worse or if her blood pressures fall lower than 143 she is to call. -     LIPID PANEL  -     METABOLIC PANEL, COMPREHENSIVE  -     HEMOGLOBIN    2. Pure hypercholesterolemia-recheck  -     LIPID PANEL  -     METABOLIC PANEL, COMPREHENSIVE-    3. Hypothyroidism due to acquired atrophy of thyroid-recheck  -     T4, FREE    4. Chronic renal impairment, stage 3 (moderate)-recheck  -     METABOLIC PANEL, COMPREHENSIVE    5. Unsteady gait-Hallpike and cerebellar testing were negative. This is a transient phenomenon that first occurred here in the office.  -     METABOLIC PANEL, COMPREHENSIVE  -     HEMOGLOBIN  -     T4, FREE    6. Cerumen debris on tympanic membrane of both ears-her family complains that her hearing is decreased so we will send her to see Dr. Taylor Lucero.  -     Walter KEY/PEREZ UNILAT      Follow-up Disposition:  Return in about 1 month (around 8/11/2018) for Dr. Taylor Lucero next week for hearing. .        Plan of care:  Discussed diagnoses in detail with patient. Medication risks/benefits/side effects discussed with patient. All of the patient's questions were addressed. The patient understands and agrees with our plan of care.     The patient knows to call back if they are unsure of or forget any changes we discussed today or if the symptoms change. The patient received an After-Visit Summary which contains VS, orders, medication list and allergy list. This can be used as a \"mini-medical record\" should they have to seek medical care while out of town. Patient Care Team:  Chuck Jacobs MD as PCP - General    Follow-up Disposition:  Return in about 1 month (around 8/11/2018) for Dr. Itzel El next week for hearing. .    Future Appointments  Date Time Provider Department Center   8/13/2018 10:10 AM [facility]       Signed By: Chuck Jacobs MD     July 11, 2018

## 2018-07-11 NOTE — PROGRESS NOTES
Chief Complaint   Patient presents with    Hypertension    Labs     Fasting    Difficulty Walking     Body mass index is 20.02 kg/(m^2). o  1. Have you been to the ER, urgent care clinic since your last visit? Hospitalized since your last visit? No    2. Have you seen or consulted any other health care providers outside of the [address] [place] [address] since your last visit? Include any pap smears or colon screening. No    Reviewed record in preparation for visit and have necessary documentation  Pt did not bring medication to office visit for review  Opportunity was given for questions  Goals that were addressed and/or need to be completed after this appointment include: There are no preventive care reminders to display for this patient.

## 2018-07-11 NOTE — PATIENT INSTRUCTIONS
Billy Simpson with [facility]  [address] [place] [address], [facility] [address] [place], [facility]  [phone]    Monitor blood pressure outside the office several times weekly at different times during the day and evening. Bring the record to me in 3 weeks for review.     Blood Pressure Record     Patient Name:  ______________________ :  ______________________    Date/Time BP Reading Pulse

## 2018-07-12 LAB
ALBUMIN SERPL-MCNC: 4.6 G/DL (ref 3.2–4.6)
ALBUMIN/GLOB SERPL: 1.6 {RATIO} (ref 1.2–2.2)
ALP SERPL-CCNC: 79 IU/L (ref 39–117)
ALT SERPL-CCNC: 6 IU/L (ref 0–32)
AST SERPL-CCNC: 16 IU/L (ref 0–40)
BILIRUB SERPL-MCNC: 0.4 MG/DL (ref 0–1.2)
BUN SERPL-MCNC: 57 MG/DL (ref 10–36)
BUN/CREAT SERPL: 26 (ref 12–28)
CALCIUM SERPL-MCNC: 10.8 MG/DL (ref 8.7–10.3)
CHLORIDE SERPL-SCNC: 105 MMOL/L (ref 96–106)
CHOLEST SERPL-MCNC: 188 MG/DL (ref 100–199)
CO2 SERPL-SCNC: 18 MMOL/L (ref 20–29)
CREAT SERPL-MCNC: 2.17 MG/DL (ref 0.57–1)
GLOBULIN SER CALC-MCNC: 2.8 G/DL (ref 1.5–4.5)
GLUCOSE SERPL-MCNC: 80 MG/DL (ref 65–99)
HDLC SERPL-MCNC: 75 MG/DL
HGB BLD-MCNC: 11.1 G/DL (ref 11.1–15.9)
INTERPRETATION: NORMAL
LDLC SERPL CALC-MCNC: 92 MG/DL (ref 0–99)
POTASSIUM SERPL-SCNC: 4.4 MMOL/L (ref 3.5–5.2)
PROT SERPL-MCNC: 7.4 G/DL (ref 6–8.5)
SODIUM SERPL-SCNC: 144 MMOL/L (ref 134–144)
T4 FREE SERPL-MCNC: 1.23 NG/DL (ref 0.82–1.77)
TRIGL SERPL-MCNC: 105 MG/DL (ref 0–149)
VLDLC SERPL CALC-MCNC: 21 MG/DL (ref 5–40)

## 2018-07-12 RX ORDER — LEVOTHYROXINE SODIUM 25 UG/1
TABLET ORAL
Qty: 90 TAB | Refills: 3 | Status: SHIPPED | OUTPATIENT
Start: 2018-07-12 | End: 2019-04-11 | Stop reason: SDUPTHER

## 2018-08-03 DIAGNOSIS — R60.0 BILATERAL EDEMA OF LOWER EXTREMITY: ICD-10-CM

## 2018-08-03 DIAGNOSIS — I13.10 MALIGNANT HTN WITH HEART DISEASE, W/O CHF, WITH CHRONIC KIDNEY DISEASE: ICD-10-CM

## 2018-08-06 RX ORDER — FUROSEMIDE 40 MG/1
TABLET ORAL
Qty: 30 TAB | Refills: 0 | Status: SHIPPED | OUTPATIENT
Start: 2018-08-06 | End: 2018-09-02 | Stop reason: SDUPTHER

## 2018-08-22 DIAGNOSIS — E78.2 MIXED HYPERLIPIDEMIA: ICD-10-CM

## 2018-08-23 RX ORDER — SIMVASTATIN 40 MG/1
TABLET, FILM COATED ORAL
Qty: 90 TAB | Refills: 2 | Status: SHIPPED | OUTPATIENT
Start: 2018-08-23 | End: 2019-05-28 | Stop reason: SDUPTHER

## 2018-11-23 DIAGNOSIS — I13.10 MALIGNANT HTN WITH HEART DISEASE, W/O CHF, WITH CHRONIC KIDNEY DISEASE: ICD-10-CM

## 2018-11-24 RX ORDER — HYDRALAZINE HYDROCHLORIDE 50 MG/1
TABLET, FILM COATED ORAL
Qty: 120 TAB | Refills: 5 | Status: SHIPPED | OUTPATIENT
Start: 2018-11-24 | End: 2019-07-16 | Stop reason: SDUPTHER

## 2019-01-01 ENCOUNTER — PATIENT OUTREACH (OUTPATIENT)
Dept: FAMILY MEDICINE CLINIC | Age: 84
End: 2019-01-01

## 2019-01-01 ENCOUNTER — TELEPHONE (OUTPATIENT)
Dept: FAMILY MEDICINE CLINIC | Age: 84
End: 2019-01-01

## 2019-01-01 ENCOUNTER — HOSPITAL ENCOUNTER (OUTPATIENT)
Dept: LAB | Age: 84
Discharge: HOME | End: 2019-11-25

## 2019-01-01 ENCOUNTER — OFFICE VISIT (OUTPATIENT)
Dept: FAMILY MEDICINE CLINIC | Age: 84
End: 2019-01-01

## 2019-01-01 VITALS
DIASTOLIC BLOOD PRESSURE: 63 MMHG | WEIGHT: 99 LBS | TEMPERATURE: 98.5 F | SYSTOLIC BLOOD PRESSURE: 110 MMHG | OXYGEN SATURATION: 99 % | HEART RATE: 66 BPM | HEIGHT: 63 IN | RESPIRATION RATE: 16 BRPM | BODY MASS INDEX: 17.54 KG/M2

## 2019-01-01 VITALS
BODY MASS INDEX: 18.61 KG/M2 | DIASTOLIC BLOOD PRESSURE: 58 MMHG | WEIGHT: 105 LBS | OXYGEN SATURATION: 100 % | RESPIRATION RATE: 16 BRPM | HEIGHT: 63 IN | SYSTOLIC BLOOD PRESSURE: 110 MMHG | HEART RATE: 69 BPM | TEMPERATURE: 97.7 F

## 2019-01-01 DIAGNOSIS — I13.10 MALIGNANT HTN WITH HEART DISEASE, W/O CHF, WITH CHRONIC KIDNEY DISEASE: Primary | Chronic | ICD-10-CM

## 2019-01-01 DIAGNOSIS — N18.4 CKD (CHRONIC KIDNEY DISEASE) STAGE 4, GFR 15-29 ML/MIN (HCC): ICD-10-CM

## 2019-01-01 DIAGNOSIS — I13.10 MALIGNANT HTN WITH HEART DISEASE, W/O CHF, WITH CHRONIC KIDNEY DISEASE: Chronic | ICD-10-CM

## 2019-01-01 DIAGNOSIS — E03.4 HYPOTHYROIDISM DUE TO ACQUIRED ATROPHY OF THYROID: Chronic | ICD-10-CM

## 2019-01-01 DIAGNOSIS — Z23 ENCOUNTER FOR IMMUNIZATION: ICD-10-CM

## 2019-01-01 DIAGNOSIS — D63.8 ANEMIA, CHRONIC DISEASE: ICD-10-CM

## 2019-01-01 DIAGNOSIS — I13.10 MALIGNANT HTN WITH HEART DISEASE, W/O CHF, WITH CHRONIC KIDNEY DISEASE: Primary | ICD-10-CM

## 2019-01-01 DIAGNOSIS — E78.00 PURE HYPERCHOLESTEROLEMIA: Chronic | ICD-10-CM

## 2019-01-01 DIAGNOSIS — Z51.5 HOSPICE CARE: ICD-10-CM

## 2019-01-01 LAB
ALBUMIN SERPL-MCNC: 3.8 G/DL (ref 3.5–5)
ALBUMIN SERPL-MCNC: 4.4 G/DL (ref 3.2–4.6)
ALBUMIN/GLOB SERPL: 2 {RATIO} (ref 1.2–2.2)
ALP SERPL-CCNC: 56 IU/L (ref 39–117)
ALT SERPL-CCNC: 7 IU/L (ref 0–32)
ANION GAP SERPL CALC-SCNC: 9 MMOL/L (ref 5–15)
AST SERPL-CCNC: 13 IU/L (ref 0–40)
BILIRUB SERPL-MCNC: 0.3 MG/DL (ref 0–1.2)
BUN SERPL-MCNC: 101 MG/DL (ref 10–36)
BUN SERPL-MCNC: 120 MG/DL (ref 6–20)
BUN/CREAT SERPL: 26 (ref 12–28)
BUN/CREAT SERPL: 31 (ref 12–20)
CALCIUM SERPL-MCNC: 10.7 MG/DL (ref 8.5–10.1)
CALCIUM SERPL-MCNC: 10.9 MG/DL (ref 8.5–10.1)
CALCIUM SERPL-MCNC: 11.1 MG/DL (ref 8.7–10.3)
CHLORIDE SERPL-SCNC: 103 MMOL/L (ref 97–108)
CHLORIDE SERPL-SCNC: 99 MMOL/L (ref 96–106)
CO2 SERPL-SCNC: 20 MMOL/L (ref 20–29)
CO2 SERPL-SCNC: 26 MMOL/L (ref 21–32)
CREAT SERPL-MCNC: 3.87 MG/DL (ref 0.55–1.02)
CREAT SERPL-MCNC: 3.92 MG/DL (ref 0.57–1)
GLOBULIN SER CALC-MCNC: 2.2 G/DL (ref 1.5–4.5)
GLUCOSE SERPL-MCNC: 106 MG/DL (ref 65–99)
GLUCOSE SERPL-MCNC: 108 MG/DL (ref 65–100)
HGB BLD-MCNC: 10.3 G/DL (ref 11.1–15.9)
HGB BLD-MCNC: 9.9 G/DL (ref 11.5–16)
INTERPRETATION: NORMAL
PHOSPHATE SERPL-MCNC: 6.1 MG/DL (ref 2.6–4.7)
POTASSIUM SERPL-SCNC: 3.8 MMOL/L (ref 3.5–5.2)
POTASSIUM SERPL-SCNC: 3.9 MMOL/L (ref 3.5–5.1)
PROT SERPL-MCNC: 6.6 G/DL (ref 6–8.5)
PTH-INTACT SERPL-MCNC: 279.5 PG/ML (ref 18.4–88)
SODIUM SERPL-SCNC: 138 MMOL/L (ref 136–145)
SODIUM SERPL-SCNC: 142 MMOL/L (ref 134–144)
T4 FREE SERPL-MCNC: 1.16 NG/DL (ref 0.82–1.77)

## 2019-01-01 RX ORDER — FELODIPINE 5 MG/1
TABLET, EXTENDED RELEASE ORAL
Qty: 90 TAB | Refills: 1 | Status: SHIPPED | OUTPATIENT
Start: 2019-01-01 | End: 2020-01-01

## 2019-01-01 RX ORDER — LEVOTHYROXINE SODIUM 25 UG/1
TABLET ORAL
Qty: 90 TAB | Refills: 1 | Status: SHIPPED | OUTPATIENT
Start: 2019-01-01 | End: 2020-01-01

## 2019-01-10 DIAGNOSIS — I13.10 MALIGNANT HTN WITH HEART DISEASE, W/O CHF, WITH CHRONIC KIDNEY DISEASE: ICD-10-CM

## 2019-01-11 RX ORDER — FELODIPINE 5 MG/1
TABLET, EXTENDED RELEASE ORAL
Qty: 30 TAB | Refills: 0 | Status: SHIPPED | OUTPATIENT
Start: 2019-01-11 | End: 2019-04-11 | Stop reason: SDUPTHER

## 2019-01-25 ENCOUNTER — OFFICE VISIT (OUTPATIENT)
Dept: FAMILY MEDICINE CLINIC | Age: 84
End: 2019-01-25

## 2019-01-25 ENCOUNTER — HOSPITAL ENCOUNTER (EMERGENCY)
Age: 84
Discharge: HOME | End: 2019-01-25
Attending: EMERGENCY MEDICINE | Admitting: EMERGENCY MEDICINE
Payer: MEDICARE

## 2019-01-25 ENCOUNTER — APPOINTMENT (OUTPATIENT)
Dept: CT IMAGING | Age: 84
End: 2019-01-25
Attending: PHYSICIAN ASSISTANT
Payer: MEDICARE

## 2019-01-25 VITALS
TEMPERATURE: 97.2 F | SYSTOLIC BLOOD PRESSURE: 211 MMHG | HEART RATE: 79 BPM | DIASTOLIC BLOOD PRESSURE: 76 MMHG | OXYGEN SATURATION: 96 %

## 2019-01-25 VITALS
BODY MASS INDEX: 20.02 KG/M2 | WEIGHT: 113 LBS | RESPIRATION RATE: 20 BRPM | SYSTOLIC BLOOD PRESSURE: 189 MMHG | OXYGEN SATURATION: 98 % | HEIGHT: 63 IN | TEMPERATURE: 97.5 F | HEART RATE: 80 BPM | DIASTOLIC BLOOD PRESSURE: 61 MMHG

## 2019-01-25 DIAGNOSIS — M25.552 LEFT HIP PAIN: Primary | ICD-10-CM

## 2019-01-25 PROCEDURE — 74011250637 HC RX REV CODE- 250/637: Performed by: PHYSICIAN ASSISTANT

## 2019-01-25 PROCEDURE — 99285 EMERGENCY DEPT VISIT HI MDM: CPT

## 2019-01-25 PROCEDURE — 73700 CT LOWER EXTREMITY W/O DYE: CPT

## 2019-01-25 PROCEDURE — 74011000250 HC RX REV CODE- 250: Performed by: PHYSICIAN ASSISTANT

## 2019-01-25 RX ORDER — HYDRALAZINE HYDROCHLORIDE 25 MG/1
50 TABLET, FILM COATED ORAL
Status: COMPLETED | OUTPATIENT
Start: 2019-01-25 | End: 2019-01-25

## 2019-01-25 RX ORDER — DICLOFENAC SODIUM 10 MG/G
GEL TOPICAL 4 TIMES DAILY
Qty: 100 G | Refills: 0 | Status: SHIPPED | OUTPATIENT
Start: 2019-01-25 | End: 2019-04-17

## 2019-01-25 RX ORDER — LIDOCAINE 4 G/100G
1 PATCH TOPICAL EVERY 24 HOURS
Status: DISCONTINUED | OUTPATIENT
Start: 2019-01-25 | End: 2019-01-25 | Stop reason: HOSPADM

## 2019-01-25 RX ORDER — KETOROLAC TROMETHAMINE 30 MG/ML
60 INJECTION, SOLUTION INTRAMUSCULAR; INTRAVENOUS ONCE
Qty: 1 VIAL | Refills: 0
Start: 2019-01-25 | End: 2019-01-25

## 2019-01-25 RX ORDER — TRAMADOL HYDROCHLORIDE 50 MG/1
50 TABLET ORAL
Status: COMPLETED | OUTPATIENT
Start: 2019-01-25 | End: 2019-01-25

## 2019-01-25 RX ORDER — TRAMADOL HYDROCHLORIDE 50 MG/1
50 TABLET ORAL
Qty: 20 TAB | Refills: 0 | Status: SHIPPED | OUTPATIENT
Start: 2019-01-25 | End: 2019-02-06 | Stop reason: SDUPTHER

## 2019-01-25 RX ADMIN — TRAMADOL HYDROCHLORIDE 50 MG: 50 TABLET, FILM COATED ORAL at 16:30

## 2019-01-25 RX ADMIN — HYDRALAZINE HYDROCHLORIDE 50 MG: 25 TABLET, FILM COATED ORAL at 14:10

## 2019-01-25 NOTE — PATIENT INSTRUCTIONS
Ms. Demetrius Caro presents with excruciating \"stabbing\" left hip pain. Family states on 01/22/2019 she was ambulating without any complaints. She states on 01/23/2019 she started feeling worsening pain and proceeded to [facility] ED-family states the imaging (xray) was negative and was d/c with the dx of arthritis. She was not given any pain medications. Denies falls, injury or MVA. Spoke with her PCP Dr. Lina Fulton and he recommends transfer for CT of left hip given the pain. Toradol 60mg IM given at 1132. EMS transporting patient to [facility] . [facility].

## 2019-01-25 NOTE — ED NOTES
Ambulatory with walker, assist with standing. Walked from room 16 to nurses station. Pain with position change but tolerated well.

## 2019-01-25 NOTE — ED TRIAGE NOTES
Patient brought in for left hip pain, no injury, possibly arthritis.  Was given pain medicine, toradol 60mg IM

## 2019-01-25 NOTE — DISCHARGE INSTRUCTIONS
Patient Education        Hip Pain: Care Instructions  Your Care Instructions    Hip pain may be caused by many things, including overuse, a fall, or a twisting movement. Another cause of hip pain is arthritis. Your pain may increase when you stand up, walk, or squat. The pain may come and go or may be constant. Home treatment can help relieve hip pain, swelling, and stiffness. If your pain is ongoing, you may need more tests and treatment. Follow-up care is a key part of your treatment and safety. Be sure to make and go to all appointments, and call your doctor if you are having problems. It's also a good idea to know your test results and keep a list of the medicines you take. How can you care for yourself at home? · Take pain medicines exactly as directed. ? If the doctor gave you a prescription medicine for pain, take it as prescribed. ? If you are not taking a prescription pain medicine, ask your doctor if you can take an over-the-counter medicine. · Rest and protect your hip. Take a break from any activity, including standing or walking, that may cause pain. · Put ice or a cold pack against your hip for 10 to 20 minutes at a time. Try to do this every 1 to 2 hours for the next 3 days (when you are awake) or until the swelling goes down. Put a thin cloth between the ice and your skin. · Sleep on your healthy side with a pillow between your knees, or sleep on your back with pillows under your knees. · If there is no swelling, you can put moist heat, a heating pad, or a warm cloth on your hip. Do gentle stretching exercises to help keep your hip flexible. · Learn how to prevent falls. Have your vision and hearing checked regularly. Wear slippers or shoes with a nonskid sole. · Stay at a healthy weight. · Wear comfortable shoes. When should you call for help? Call 911 anytime you think you may need emergency care.  For example, call if:    · You have sudden chest pain and shortness of breath, or you cough up blood.     · You are not able to stand or walk or bear weight.     · Your buttocks, legs, or feet feel numb or tingly.     · Your leg or foot is cool or pale or changes color.     · You have severe pain.    Call your doctor now or seek immediate medical care if:    · You have signs of infection, such as:  ? Increased pain, swelling, warmth, or redness in the hip area. ? Red streaks leading from the hip area. ? Pus draining from the hip area. ? A fever.     · You have signs of a blood clot, such as:  ? Pain in your calf, back of the knee, thigh, or groin. ? Redness and swelling in your leg or groin.     · You are not able to bend, straighten, or move your leg normally.     · You have trouble urinating or having bowel movements.    Watch closely for changes in your health, and be sure to contact your doctor if:    · You do not get better as expected. Where can you learn more? Go to http://rafi-alejandro.info/. Enter V937 in the search box to learn more about \"Hip Pain: Care Instructions. \"  Current as of: September 23, 2018  Content Version: 11.9  © 2710-1764 [employer]. Care instructions adapted under license by [employer] (which disclaims liability or warranty for this information). If you have questions about a medical condition or this instruction, always ask your healthcare professional. [place] [address] any warranty or liability for your use of this information.

## 2019-01-25 NOTE — PROGRESS NOTES
Ms. Patricia Roberson presents with excruciating \"stabbing\" left hip pain. Family states on 01/22/2019 she was ambulating without any complaints. She states on 01/23/2019 she started feeling worsening pain and proceeded to [facility] ED-family states the imaging (xray) was negative and was d/c with the dx of arthritis. She was not given any pain medications. Denies falls, injury or MVA. Spoke with her PCP Dr. Harrell Brunner and he recommends transfer for CT of left hip given the pain. Toradol 60mg IM given at 1132. [facility] [place] . Report given to Clare Okeefe RN with [place] [address].

## 2019-01-25 NOTE — ED PROVIDER NOTES
81 y/o female with PMHx of HTN, HLD, TIA, migraines, hypothyroidism and arthritis, presenting with complaint of left hip pain. The patient states that 3 days ago she went to a  and when she got home she noticed a sudden onset of severe left hip pain while sitting down. She denies any falls or known traumatic injuries. She was seen at [facility] and reportedly had a negative XR of her hip. However she has continued to have left hip pain that is severe with movement and weight bearing; minimal pain at rest. She saw her PCP this morning who advised her to come to the ED for a CT scan. She was given toradol prior to arrival which has relieved her pain somewhat. She denies back pain, weakness, numbness, swelling, or any other extremity pain. The history is provided by the patient. Past Medical History:  
Diagnosis Date  Arthritis 2010  
 Hx-TIA (transient ischemic attack) 2010  Hyperlipidemia 2010  Hypertension 2010  Migraine 2010 Past Surgical History:  
Procedure Laterality Date  HX GYN    
 hysterectomy,btl  HX GYN  11  
 complete hysterectomy  HX HEENT  10/10. bilateral cataract removal.  
 
   
Family History:  
Problem Relation Age of Onset  Heart Disease Sister Social History Socioeconomic History  Marital status:  Spouse name: Not on file  Number of children: Not on file  Years of education: Not on file  Highest education level: Not on file Social Needs  Financial resource strain: Not on file  Food insecurity - worry: Not on file  Food insecurity - inability: Not on file  Transportation needs - medical: Not on file  Transportation needs - non-medical: Not on file Occupational History  Not on file Tobacco Use  Smoking status: Never Smoker  Smokeless tobacco: Never Used Substance and Sexual Activity  Alcohol use: No  
 Drug use:  No  
  Sexual activity: Not on file Other Topics Concern  Not on file Social History Narrative  Not on file ALLERGIES: Ace inhibitors Review of Systems Constitutional: Negative for chills and fever. HENT: Negative for congestion. Respiratory: Negative for cough. Gastrointestinal: Negative for diarrhea, nausea and vomiting. Musculoskeletal: Positive for arthralgias (left hip pain). Negative for back pain and myalgias. Skin: Negative for wound. Neurological: Negative for weakness and numbness. All other systems reviewed and are negative. Vitals:  
 01/25/19 1300 01/25/19 1330 01/25/19 1430 01/25/19 1500 BP: (!) 226/75 200/68 (!) 208/66 Pulse: 80 Resp: 20 Temp: 97.5 °F (36.4 °C) SpO2: 99% 98% 100% 100% Weight: 51.3 kg (113 lb) Height: 5' 3\" (1.6 m) Physical Exam  
Constitutional: She is oriented to person, place, and time. She appears well-developed and well-nourished. No distress. HENT:  
Head: Normocephalic and atraumatic. Eyes: Conjunctivae and EOM are normal.  
Neck: Normal range of motion. Neck supple. Cardiovascular: Normal rate, regular rhythm and normal heart sounds. Pulmonary/Chest: Effort normal and breath sounds normal.  
Abdominal: She exhibits no distension. There is no tenderness. Musculoskeletal:  
Left lateral hip tender to palpation. No tenderness of left knee, anterior thigh or groin. No lumbar tenderness to palpation. Active ROM of left hip intact, some pain with hip flexion but no pain with internal/external rotation. Neurological: She is alert and oriented to person, place, and time. Skin: Skin is warm and dry. She is not diaphoretic. Nursing note and vitals reviewed. MDM Number of Diagnoses or Management Options Left hip pain:  
  
Amount and/or Complexity of Data Reviewed Tests in the radiology section of CPT®: ordered and reviewed Discuss the patient with other providers: yes (Dr. Kristyn Umana, ED attending) Patient Progress Patient progress: stable Procedures 81 y/o female with PMHx of HTN, HLD, TIA, migraines, hypothyroidism and arthritis, presenting with complaint of left hip pain. CT hip is negative for fractures. The patient is ambulatory with walker with steady gait. Safe for discharge home with tramadol and topical voltaren gel. Recommended prompt PCP follow up for further management of pain. Strict ED return precautions discussed and provided in writing at time of discharge. The patient and her family verbalized understanding and agreement with this plan. I personally saw and examined the patient. I have reviewed and agree with the MLP's findings, including all diagnostic interpretations, and plans as written. I was present during the key portions of separately billed procedures.    
Stu Rodríguez MD

## 2019-01-29 ENCOUNTER — PATIENT OUTREACH (OUTPATIENT)
Dept: FAMILY MEDICINE CLINIC | Age: 84
End: 2019-01-29

## 2019-01-29 NOTE — PROGRESS NOTES
ED Discharge Follow-Up Date/Time:     2019 10:04 AM 
 
Patient presented to [place] [address]  ED on 19 and was diagnosed with left hip pain. Top Challenges reviewed with the provider · Negative CT of hip Method of communication with provider :chart routing Nurse Navigator(NN) contacted the  patient  by telephone to perform post ED discharge assessment. Verified name and  with patient as identifiers. Provided introduction to self, and explanation of the Nurse Navigator role. Patient reported assessment: \"I am doing much better. What ever ya'll did, did the trick\". Medication(s):  
New Medications at Discharge: Tramadol, Voltaren gel Changed Medications at Discharge: none Discontinued Medications at Discharge: none There were no barriers to obtaining medications identified at this time. Reviewed discharge instructions and red flags with  patient who voiced understanding. Patient given an opportunity to ask questions and does not have any further questions or concerns at this time. The patient agrees to contact the PCP office for questions related to their healthcare. Patient reminded that there are physicians on call 24 hours a day / 7 days a week (M- 5pm to 8am and from Friday 5pm until Monday 8am for the weekend) should the patient have questions or concerns. NN provided contact information for future reference. Offered follow up appointment with PCP: already has [facility] follow up appointment(s):  
Future Appointments Date Time Provider Tiago De Oliveira 2019 11:10 AM Anca Evangelista MD [facility] follow up appointment(s): n/a [employer]: n/a 
 www. [employer] 9 AM- 9 PM.   Phone [phone] Goals None

## 2019-02-06 ENCOUNTER — OFFICE VISIT (OUTPATIENT)
Dept: FAMILY MEDICINE CLINIC | Age: 84
End: 2019-02-06

## 2019-02-06 VITALS
RESPIRATION RATE: 18 BRPM | TEMPERATURE: 98.2 F | WEIGHT: 111.2 LBS | SYSTOLIC BLOOD PRESSURE: 167 MMHG | OXYGEN SATURATION: 99 % | BODY MASS INDEX: 19.7 KG/M2 | DIASTOLIC BLOOD PRESSURE: 62 MMHG | HEIGHT: 63 IN | HEART RATE: 73 BPM

## 2019-02-06 DIAGNOSIS — I13.10 MALIGNANT HTN WITH HEART DISEASE, W/O CHF, WITH CHRONIC KIDNEY DISEASE: Primary | ICD-10-CM

## 2019-02-06 DIAGNOSIS — M19.90 ARTHRITIS: ICD-10-CM

## 2019-02-06 DIAGNOSIS — M25.559 ARTHRALGIA OF HIP, UNSPECIFIED LATERALITY: ICD-10-CM

## 2019-02-06 DIAGNOSIS — N18.4 CHRONIC RENAL IMPAIRMENT, STAGE 4 (SEVERE) (HCC): ICD-10-CM

## 2019-02-06 DIAGNOSIS — M25.552 LEFT HIP PAIN: ICD-10-CM

## 2019-02-06 DIAGNOSIS — R09.89 BILATERAL CAROTID BRUITS: ICD-10-CM

## 2019-02-06 DIAGNOSIS — E78.00 PURE HYPERCHOLESTEROLEMIA: ICD-10-CM

## 2019-02-06 RX ORDER — TRAMADOL HYDROCHLORIDE 50 MG/1
50 TABLET ORAL
Qty: 60 TAB | Refills: 2 | Status: SHIPPED | OUTPATIENT
Start: 2019-02-06

## 2019-02-06 NOTE — PROGRESS NOTES
[address] 
[phone]  
 
 
Progress Note Patient: Guillermina Zurita MRN: [id number]  SSN: [id number] YOB: 1923  Age: 80 y.o. Sex: female Chief Complaint Patient presents with  
 Hip Pain  
  left Subjective:  
 
Encounter Diagnoses Name Primary? 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 
BP Readings from Last 3 Encounters:  
02/06/19 167/62  
01/25/19 189/61  
01/25/19 (!) 211/76 The patient reports:  taking medications as instructed, no medication side effects noted, no TIA's, no chest pain on exertion, no dyspnea on exertion, no swelling of ankles. Key CAD CHF Meds   
    
  
 felodipine (PLENDIL SR) 5 mg 24 hr tablet  (Taking) Take one tablet by mouth daily. APPT REQUIRED PRIOR TO FURTHER REFILLS. PLEASE CALL TO SCHEDULE  
 hydrALAZINE (APRESOLINE) 50 mg tablet  (Taking) TAKE ONE TABLET BY MOUTH 4 TIMES DAILY FOR  HYPERTENSION  
 furosemide (LASIX) 40 mg tablet  (Taking) TAKE 1 TABLET BY MOUTH ONCE DAILY  
 simvastatin (ZOCOR) 40 mg tablet  (Taking) TAKE 1 TABLET BY MOUTH EACH NIGHT FOR MIXED HYPERLIPIDEMIA  
 losartan (COZAAR) 100 mg tablet  (Taking) Take 1 Tab by mouth daily. nitroglycerin (NITROBID) 2 % ointment  (Taking) Apply 0.5 Inches to affected area every four (4) hours. Apply 1/2 inch to the chest when systolic blood pressure is > 170.  
 omega-3 fatty acids-vitamin e (FISH OIL) 1,000 mg Cap  (Taking) Take 1 Cap by mouth. aspirin delayed-release 81 mg tablet  (Taking) Take 81 mg by mouth daily. simvastatin (ZOCOR) 40 mg tablet Take 1 Tab by mouth nightly for 90 days. Indications: mixed hyperlipidemia Lab Results Component Value Date/Time  Sodium 144 07/11/2018 09:36 AM  
 Potassium 4.4 07/11/2018 09:36 AM  
 Chloride 105 07/11/2018 09:36 AM  
 CO2 18 (L) 07/11/2018 09:36 AM  
 Anion gap 10 10/06/2010 10:34 AM  
 Glucose 80 07/11/2018 09:36 AM  
 BUN 57 (H) 07/11/2018 09:36 AM  
 Creatinine 2.17 (H) 07/11/2018 09:36 AM  
 BUN/Creatinine ratio 26 07/11/2018 09:36 AM  
 GFR est AA 22 (L) 07/11/2018 09:36 AM  
 GFR est non-AA 19 (L) 07/11/2018 09:36 AM  
 Calcium 10.8 (H) 07/11/2018 09:36 AM  
 Bilirubin, total 0.4 07/11/2018 09:36 AM  
 AST (SGOT) 16 07/11/2018 09:36 AM  
 Alk. phosphatase 79 07/11/2018 09:36 AM  
 Protein, total 7.4 07/11/2018 09:36 AM  
 Albumin 4.6 07/11/2018 09:36 AM  
 Globulin 3.2 10/06/2010 10:34 AM  
 A-G Ratio 1.6 07/11/2018 09:36 AM  
 ALT (SGPT) 6 07/11/2018 09:36 AM  
 
Low salt diet? yes Aerobic exercise? no 
Our goal is to normalize the blood pressure to decrease the risks of strokes and heart attacks. The patient is in agreement with the plan. Yes  Chronic renal impairment, stage 4 (severe) ([facility] [address]): Today Lab Results Component Value Date/Time GFR est AA 22 (L) 07/11/2018 09:36 AM  
 GFR est non-AA 19 (L) 07/11/2018 09:36 AM  
 Creatinine 2.17 (H) 07/11/2018 09:36 AM  
 BUN 57 (H) 07/11/2018 09:36 AM  
 Sodium 144 07/11/2018 09:36 AM  
 Potassium 4.4 07/11/2018 09:36 AM  
 Chloride 105 07/11/2018 09:36 AM  
 CO2 18 (L) 07/11/2018 09:36 AM  
 
 
   
 Pure hypercholesterolemia: 
Cardiovascular risks for her are: LDL goal is under 130. Key Antihyperlipidemia Meds   
    
  
 simvastatin (ZOCOR) 40 mg tablet  (Taking) TAKE 1 TABLET BY MOUTH EACH NIGHT FOR MIXED HYPERLIPIDEMIA  
 omega-3 fatty acids-vitamin e (FISH OIL) 1,000 mg Cap  (Taking) Take 1 Cap by mouth. simvastatin (ZOCOR) 40 mg tablet Take 1 Tab by mouth nightly for 90 days. Indications: mixed hyperlipidemia Lab Results Component Value Date/Time Cholesterol, total 188 07/11/2018 09:36 AM  
 HDL Cholesterol 75 07/11/2018 09:36 AM  
 LDL, calculated 92 07/11/2018 09:36 AM  
 Triglyceride 105 07/11/2018 09:36 AM  
 CHOL/HDL Ratio 3.3 10/06/2010 10:34 AM  
 
Lab Results Component Value Date/Time ALT (SGPT) 6 07/11/2018 09:36 AM  
 AST (SGOT) 16 07/11/2018 09:36 AM  
 Alk. phosphatase 79 07/11/2018 09:36 AM  
 Bilirubin, total 0.4 07/11/2018 09:36 AM  
 
 Myalgias: No 
 Fatigue: No 
 Other side effects: no Wt Readings from Last 3 Encounters:  
02/06/19 111 lb 3.2 oz (50.4 kg) 01/25/19 113 lb (51.3 kg) 07/11/18 113 lb (51.3 kg) The patient is aware of our goal to reduce or eliminate the long term problems (such as strokes and heart attacks) related to poorly controlled hyperlipidemia.  Arthritis: He had a spell of severe sudden excruciating left hip pain  earlier in the month. She had to be seen twice in the emergency room. Her CT scan did not show an occult fracture but it did show significant osteoarthritis in her left hip and also degenerative disc disease in her spine. Examination her point of maximum tenderness is over the sciatic notch area so this could have been neuropathic pain that just did not radiate. Regardless she is gotten better with tramadol and Voltaren gel. Today she is pain-free. She has been averaging 2 tramadol tablets daily.  Arthralgia of hip, unspecified laterality: See above. 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  Bilateral carotid bruits: It is been 2 years since she had a carotid arteries check for bruits. 2 years ago she had less than 49% blockage in the internal carotids. Today she has bilateral carotid bruits that are probably exaggerated due to her very high blood pressure. She needs retesting nonetheless. She has had no stroke symptoms. Current and past medical information: 
 
Current Medications after this visit[de-identified]    
Current Outpatient Medications Medication Sig  
 traMADol (ULTRAM) 50 mg tablet Take 1 Tab by mouth every six (6) hours as needed for Pain. Max Daily Amount: 200 mg.  
 diclofenac (VOLTAREN) 1 % gel Apply  to affected area four (4) times daily.  felodipine (PLENDIL SR) 5 mg 24 hr tablet Take one tablet by mouth daily. APPT REQUIRED PRIOR TO FURTHER REFILLS. PLEASE CALL TO SCHEDULE  
 hydrALAZINE (APRESOLINE) 50 mg tablet TAKE ONE TABLET BY MOUTH 4 TIMES DAILY FOR  HYPERTENSION  furosemide (LASIX) 40 mg tablet TAKE 1 TABLET BY MOUTH ONCE DAILY  simvastatin (ZOCOR) 40 mg tablet TAKE 1 TABLET BY MOUTH EACH NIGHT FOR MIXED HYPERLIPIDEMIA  levothyroxine (SYNTHROID) 25 mcg tablet TAKE ONE TABLET BY MOUTH ONCE DAILY BEFORE BREAKFAST  losartan (COZAAR) 100 mg tablet Take 1 Tab by mouth daily.  nitroglycerin (NITROBID) 2 % ointment Apply 0.5 Inches to affected area every four (4) hours. Apply 1/2 inch to the chest when systolic blood pressure is > 170.  albuterol (PROVENTIL HFA, VENTOLIN HFA, PROAIR HFA) 90 mcg/actuation inhaler Take 2 Puffs by inhalation every six (6) hours as needed for Wheezing (use her insurance's generic).  fexofenadine (ALLEGRA) 180 mg tablet Take 1 Tab by mouth daily. For allergies.  omega-3 fatty acids-vitamin e (FISH OIL) 1,000 mg Cap Take 1 Cap by mouth.  aspirin delayed-release 81 mg tablet Take 81 mg by mouth daily.  cholecalciferol, vitamin d3, (VITAMIN D) 1,000 unit tablet Take 1,000 Units by mouth daily.  cyanocobalamin (VITAMIN B-12) 1,000 mcg tablet Take 1,000 mcg by mouth daily.  simvastatin (ZOCOR) 40 mg tablet Take 1 Tab by mouth nightly for 90 days. Indications: mixed hyperlipidemia No current facility-administered medications for this visit. Patient Active Problem List  
 Diagnosis Date Noted  Hyperlipidemia 05/24/2010 Priority: 1 - One  Arthritis 05/24/2010 Priority: 1 - One  
 Hx-TIA (transient ischemic attack) 05/24/2010 Priority: 1 - One  Migraine 05/24/2010 Priority: 1 - One  
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  
Diagnosis Date  Arthritis 5/24/2010  
 Hx-TIA (transient ischemic attack) 5/24/2010  Hyperlipidemia 5/24/2010  Hypertension 5/24/2010  Migraine 5/24/2010 Allergies Allergen Reactions  Ace Inhibitors Other (comments) Cough. Also has unclear reaction to ARB's Past Surgical History:  
Procedure Laterality Date  HX GYN    
 hysterectomy,btl  HX GYN  9/6/11  
 complete hysterectomy  HX HEENT  10/10. bilateral cataract removal.  
 
 
Social History Socioeconomic History  Marital status:  Spouse name: Not on file  Number of children: Not on file  Years of education: Not on file  Highest education level: Not on file Tobacco Use  Smoking status: Never Smoker  Smokeless tobacco: Never Used Substance and Sexual Activity  Alcohol use: No  
 Drug use: No  
 
 
Review of Systems Constitutional: Negative. Negative for chills, fever, malaise/fatigue and weight loss. HENT: Negative. Negative for hearing loss. Eyes: Negative. Negative for blurred vision and double vision. Respiratory: Negative for cough, sputum production and shortness of breath. Cardiovascular: Negative. Negative for chest pain and palpitations. Gastrointestinal: Negative. Negative for abdominal pain, blood in stool, heartburn, nausea and vomiting. Genitourinary: Negative. Negative for dysuria, frequency and urgency. Musculoskeletal: Positive for joint pain. Negative for back pain, myalgias and neck pain. Left sciatic notch/hip pain. Skin: Negative. Negative for rash. Neurological: Negative. Negative for weakness. Endo/Heme/Allergies: Negative. Psychiatric/Behavioral: Negative. Negative for depression and suicidal ideas. She is alert and oriented. Objective:  
 
Vitals:  
 02/06/19 1109 02/06/19 1132 BP: 200/60 167/62 Pulse: 73 Resp: 18 Temp: 98.2 °F (36.8 °C) TempSrc: Oral   
SpO2: 99% Weight: 111 lb 3.2 oz (50.4 kg) Height: 5' 3\" (1.6 m) Body mass index is 19.7 kg/m². Physical Exam  
Constitutional: She is oriented to person, place, and time and well-developed, well-nourished, and in no distress. No distress. HENT:  
Head: Normocephalic and atraumatic. Mouth/Throat: Oropharynx is clear and moist.  
Eyes: Conjunctivae are normal.  
Neck: No thyromegaly present. Cardiovascular: Normal rate. Murmur heard. 3/6 systolic murmur primarily right precordium. Pulmonary/Chest: Effort normal and breath sounds normal. No respiratory distress. She has no wheezes. She has no rales. Abdominal: Soft. She exhibits no distension. There is no tenderness. There is no rebound. Musculoskeletal: She exhibits no edema. He points to the posterior hip as the source of her pain. It is at the sciatic notch. She has a pelvic tilt with the left side being higher than the right. This is indicative of significant arthritis in her lumbar spine as well. Neurological: She is alert and oriented to person, place, and time. Skin: Skin is warm. No rash noted. She is not diaphoretic. No erythema. Psychiatric: Mood and affect normal.  
Nursing note and vitals reviewed. Health Maintenance Due Topic Date Due  Shingrix Vaccine Age 50> (1 of 2) 07/17/1973  Influenza Age 5 to Adult  08/01/2018 Assessment and orders:  
 
Encounter Diagnoses ICD-10-CM ICD-9-CM 1. 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  
2. Chronic renal impairment, stage 4 (severe) (HCC) N18.4 585.4 3. Pure hypercholesterolemia E78.00 272.0 4. Arthritis M19.90 716.90  
5. Arthralgia of hip, unspecified laterality M25.559 719.45 6. Left hip pain M25.552 719.45  
7. Bilateral carotid bruits R09.89 785.9 Diagnoses and all orders for this visit: 
 
 1. 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 Repeat blood pressure is better than initial blood pressure. The blood pressure readings from home. -     T4, FREE 
-     LIPID PANEL 
-     RENAL FUNCTION PANEL 
-     PROT+CREATU (RANDOM) -     AMB POC DUPLEX SCAN EXTRACRANIAL,BILAT 2. Chronic renal impairment, stage 4 (severe) (HCC)-she is overdue for renal testing. 
-     RENAL FUNCTION PANEL 
-     PROT+CREATU (RANDOM) 
-     PTH INTACT 
-     VITAMIN D, 25 HYDROXY 
-     HEMOGLOBIN 3. Pure hypercholesterolemia-retest 
-     LIPID PANEL 
-     AST 
-     ALT 4. Arthritis-hip and lower spine. 5. Arthralgia of hip, unspecified laterality-left hip 6. Left hip pain-currently asymptomatic with tramadol twice daily and Voltaren gel. Recommended heated mattress cover for the cold damp nights. -     traMADol (ULTRAM) 50 mg tablet; Take 1 Tab by mouth every six (6) hours as needed for Pain. Max Daily Amount: 200 mg. 
 
7. Bilateral carotid bruits-retest 
-     AMB POC DUPLEX SCAN EXTRACRANIAL,BILAT Plan of care: 
Discussed diagnoses in detail with patient. Medication risks/benefits/side effects discussed with patient. All of the patient's questions were addressed. The patient understands and agrees with our plan of care. The patient knows to call back if they are unsure of or forget any changes we discussed today or if the symptoms change. The patient received an After-Visit Summary which contains VS, orders, medication list and allergy list. This can be used as a \"mini-medical record\" should they have to seek medical care while out of town. Patient Care Team: 
Eleanor Kebede MD as PCP - Carlotta Nation RN as Ambulatory Care Navigator Follow-up Disposition: 
Return in about 4 weeks (around 3/6/2019). No future appointments. Signed By: Arsalan Benz MD   
 February 6, 2019 This note was created using voice recognition software. Despite editing, there may be syntax errors.

## 2019-02-06 NOTE — PROGRESS NOTES
1. Have you been to the ER, urgent care clinic since your last visit? Hospitalized since your last visit? Yes When: [facility] 2. Have you seen or consulted any other health care providers outside of the [address] [place] [address] since your last visit? Include any pap smears or colon screening. No 
Reviewed record in preparation for visit and have necessary documentation Pt did not bring medication to office visit for review Information was given to pt on Advanced Directives, Living Will Information was given on Shingles Vaccine 
opportunity was given for questions Goals that were addressed and/or need to be completed during or after this appointment include Health Maintenance Due Topic Date Due  Shingrix Vaccine Age 50> (1 of 2) 07/17/1973  Influenza Age 5 to Adult  08/01/2018

## 2019-02-06 NOTE — LETTER
AGREEMENT for controlled medication treatment I, Patricia Kahn, have agreed to a course of treatment that includes taking controlled medication. For this treatment I designate _____________________________________ as the [facility] Provider.  The purpose of this agreement is to prevent misunderstandings about controlled medications I may be taking for treatment, and to comply with applicable laws. I understand this agreement is essential to the trust and confidence necessary in a provider-patient relationship and that the designated provider will treat me in accordance with the statements below. As part of my treatment I agree to the followin.  USE 
a. I will take the controlled medication as instructed by the designated provider and avoid improper use of controlled medications. b.   I will not share, sell or trade controlled medication with anyone as this is a criminal offense.  
c.   I will not use any illegal controlled substance, including marijuana, cocaine, etc. as this is a criminal offense. 2.  PROVIDERS 
a. I will only obtain controlled medication from the designated provider. b.   I will not attempt to obtain the same or similar controlled medications, such as opioid pain medication, stimulants, anti-anxiety or hypnotics from any other provider as this is a criminal offense. 
c.   I will inform the designated provider about all other licensed professionals providing medical care to me and authorize communication between all providers to coordinate care, particularly prescribing or dispensing of controlled medications. 3.  PHARMACY 
a.   I will only fill controlled medication prescriptions at the approved pharmacy as listed below. 4.  OFFICE VISITS 
a. I agree to attend scheduled office visit appointments. b.   I am aware my office visits may be monthly or as determined necessary by the designated provider. c.   I will communicate fully with the designated provider about the character and intensity of my symptoms, the effect of the symptoms on my daily life, and how well the controlled medication is helping to relieve the cause of my symptoms. 5.  REFILLS OR CALL-IN PRESCRIPTIONS OF CONTROLLED MEDICATIONS 
a. I agree that refills of my prescriptions for controlled medications will be made at the time of an office visit during regular office hours. No refills will be available during evenings or on weekends. Abusive or inappropriate behavior related to medication refills will not be tolerated. b.   I will not call the office repeatedly to inquire about my controlled medication. I understand that medications will be written on the due date and not before. Calling the office repeatedly will be considered harassment, and I may be discharged from the practice. c.   Controlled medications may not be called in for refill, but doing so is at the discretion of the designated provider. d.   I am aware that only I must  prescriptions for controlled medications at the office but the designated provider may allow a designee to  the prescription from the office under very specific circumstance that may develop. 6.  TOXICOLOGY SCREENING 
a. I agree to random urine toxicology screenings in order to comply with government and [address] [place] [address] regulations. I understand that I will be financially responsible for any charges incurred for the urine toxicology screening, which may not be covered by my insurance. Failure to do so will be considered non-compliance and I may be discharged. b. In some cases an oral swab or hair sample may be substituted for a urine screen. This is at the discretion of the designated provider.   I understand that I will be financially responsible for any charges incurred as well. 
c.   I understand that if the urine toxicology does not show my medications prescribed to me by the designated provider, or it shows any illegal substance or any other medications NOT prescribed by the designated providers, I may be discharged from this practice at the discretion of the designated provider and no further controlled medications will be prescribed or follow-up appointments scheduled. Also, if any illegal substances are detected on the urine toxicology, this information may be provided to local law enforcement. 7. PILL COUNTS 
a. I am aware I may be called at any time to come into the office for a count of all my remaining controlled medications in order to help the designated provider understand the rate at which I use my controlled medications and to more effectively adjust dosage. b. I agree that I will use my medication at a rate NO greater than the prescribed rate and that use of my medication at a greater rate will result in my being without medication for the period of time until next expected due date. c. I will bring in the containers with the medication prescribed by all providers, including the designated provider, to each office visit even if there is no medication remaining. All controlled medication will be in the original containers from the pharmacy for each medication. Failure to do so will be considered non-compliance and I may be discharged from the practice. 8.  LOSS OR THEFT OF MEDICATION 
a. I will safeguard my controlled medication from loss or theft. b.   Lost or stolen controlled medication may not be replaced. This includes a prescription that has not yet been filled at the pharmacy. c.   In the event my controlled medications are stolen or lost, I will notify the designated providers office immediately. If such event occurs during the night, weekend or holiday, I will leave a detailed message on the answering machine or answering service at the number listed above. d.   I will file and produce an official police report for any effort to replace controlled medications prescribed. 9.  AGENCY COLLABORATION I authorize the designated provider and the authorized pharmacy/pharmacist to cooperate fully with any city, state, or federal law enforcement agency in the investigation of any possible misuse, sale or other diversion of my controlled medication. 10.  TREATMENT I understand that if I violate any of the conditions, my controlled medication and/or treatment will be terminated. If the violation involves obtaining controlled substances and/or dangerous drugs from another source, the incident may be reported to other medical facilities and authorities, including law enforcement. In this case, the designated provider may taper off the medication over a period of several days, as necessary, to avoid withdrawal symptoms or will suggest alternate treatment facilities. Also, a drug dependence treatment program may be recommended. 11.  AGREEMENT I agree to follow these guidelines that have been fully explained to me. All of my questions and concerns regarding treatment have been adequately answered. A copy of this document has been given to me. I agree to use ______________________________ Authorized Pharmacy located at _________________________________________________________________ Telephone ________________________________for filling ALL controlled medication prescriptions. This agreement is entered into on 2/6/2019 Patient signature__________________________________________________________ Legal Guardian signature___________________________________________________ Provider signature_________________________________________________________ Witness signature_________________________________________________________

## 2019-02-06 NOTE — PATIENT INSTRUCTIONS
Hip Pain: Care Instructions Your Care Instructions Hip pain may be caused by many things, including overuse, a fall, or a twisting movement. Another cause of hip pain is arthritis. Your pain may increase when you stand up, walk, or squat. The pain may come and go or may be constant. Home treatment can help relieve hip pain, swelling, and stiffness. If your pain is ongoing, you may need more tests and treatment. Follow-up care is a key part of your treatment and safety. Be sure to make and go to all appointments, and call your doctor if you are having problems. It's also a good idea to know your test results and keep a list of the medicines you take. How can you care for yourself at home? · Take pain medicines exactly as directed. ? If the doctor gave you a prescription medicine for pain, take it as prescribed. ? If you are not taking a prescription pain medicine, ask your doctor if you can take an over-the-counter medicine. · Rest and protect your hip. Take a break from any activity, including standing or walking, that may cause pain. · Put ice or a cold pack against your hip for 10 to 20 minutes at a time. Try to do this every 1 to 2 hours for the next 3 days (when you are awake) or until the swelling goes down. Put a thin cloth between the ice and your skin. · Sleep on your healthy side with a pillow between your knees, or sleep on your back with pillows under your knees. · If there is no swelling, you can put moist heat, a heating pad, or a warm cloth on your hip. Do gentle stretching exercises to help keep your hip flexible. · Learn how to prevent falls. Have your vision and hearing checked regularly. Wear slippers or shoes with a nonskid sole. · Stay at a healthy weight. · Wear comfortable shoes. When should you call for help? Call 911 anytime you think you may need emergency care. For example, call if: 
  · You have sudden chest pain and shortness of breath, or you cough up blood.   · You are not able to stand or walk or bear weight.  
  · Your buttocks, legs, or feet feel numb or tingly.  
  · Your leg or foot is cool or pale or changes color.  
  · You have severe pain.  
 Call your doctor now or seek immediate medical care if: 
  · You have signs of infection, such as: 
? Increased pain, swelling, warmth, or redness in the hip area. ? Red streaks leading from the hip area. ? Pus draining from the hip area. ? A fever.  
  · You have signs of a blood clot, such as: 
? Pain in your calf, back of the knee, thigh, or groin. ? Redness and swelling in your leg or groin.  
  · You are not able to bend, straighten, or move your leg normally.  
  · You have trouble urinating or having bowel movements.  
 Watch closely for changes in your health, and be sure to contact your doctor if: 
  · You do not get better as expected. Where can you learn more? Go to http://rafi-alejandro.info/. Enter I008 in the search box to learn more about \"Hip Pain: Care Instructions. \" Current as of: September 23, 2018 Content Version: 11.9 © 1205-0115 [employer]. Care instructions adapted under license by [employer] (which disclaims liability or warranty for this information). If you have questions about a medical condition or this instruction, always ask your healthcare professional. [place] [address] any warranty or liability for your use of this information.

## 2019-02-07 LAB
25(OH)D3+25(OH)D2 SERPL-MCNC: 37.8 NG/ML (ref 30–100)
ALBUMIN SERPL-MCNC: 4.4 G/DL (ref 3.2–4.6)
ALT SERPL-CCNC: 10 IU/L (ref 0–32)
AST SERPL-CCNC: 12 IU/L (ref 0–40)
BUN SERPL-MCNC: 52 MG/DL (ref 10–36)
BUN/CREAT SERPL: 18 (ref 12–28)
CALCIUM SERPL-MCNC: 11.1 MG/DL (ref 8.7–10.3)
CHLORIDE SERPL-SCNC: 108 MMOL/L (ref 96–106)
CHOLEST SERPL-MCNC: 188 MG/DL (ref 100–199)
CO2 SERPL-SCNC: 19 MMOL/L (ref 20–29)
CREAT SERPL-MCNC: 2.82 MG/DL (ref 0.57–1)
CREAT UR-MCNC: 68.8 MG/DL
GLUCOSE SERPL-MCNC: 92 MG/DL (ref 65–99)
HDLC SERPL-MCNC: 63 MG/DL
HGB BLD-MCNC: 10.2 G/DL (ref 11.1–15.9)
INTERPRETATION: NORMAL
LDLC SERPL CALC-MCNC: 104 MG/DL (ref 0–99)
PHOSPHATE SERPL-MCNC: 4.3 MG/DL (ref 2.5–4.5)
POTASSIUM SERPL-SCNC: 4.2 MMOL/L (ref 3.5–5.2)
PROT UR-MCNC: 27.7 MG/DL
PROT/CREAT UR: 403 MG/G CREAT (ref 0–200)
PTH-INTACT SERPL-MCNC: 156 PG/ML (ref 15–65)
SODIUM SERPL-SCNC: 145 MMOL/L (ref 134–144)
T4 FREE SERPL-MCNC: 1.22 NG/DL (ref 0.82–1.77)
TRIGL SERPL-MCNC: 104 MG/DL (ref 0–149)
VLDLC SERPL CALC-MCNC: 21 MG/DL (ref 5–40)

## 2019-02-08 ENCOUNTER — TELEPHONE (OUTPATIENT)
Dept: FAMILY MEDICINE CLINIC | Age: 84
End: 2019-02-08

## 2019-02-08 DIAGNOSIS — N18.4 CKD (CHRONIC KIDNEY DISEASE) STAGE 4, GFR 15-29 ML/MIN (HCC): Primary | ICD-10-CM

## 2019-02-08 NOTE — TELEPHONE ENCOUNTER
Spoke with patient and read her the lab letter from Dr. Kerwin Sever:    \"With exception of a slight decrease in kidney function your labs are stable. With your permission I will send you to see a kidney specialist.  I want to make sure we are doing everything we can to preserve your kidney function. \"    She stated that her daughter Katie Clark was not home and that she would like for have her go over her results. Patient agreed to have her daughter call the office when she has a chance.

## 2019-02-08 NOTE — TELEPHONE ENCOUNTER
Patient's daughter, Elsa Delgado, called back. Read her what Dr. Dagmar Tripathi had written. She is ok with her Mom seeing the Nephrologist that goes to [facility].   Please put in referral.

## 2019-02-11 DIAGNOSIS — I65.23 BILATERAL CAROTID ARTERY STENOSIS: ICD-10-CM

## 2019-02-11 DIAGNOSIS — R09.89 BILATERAL CAROTID BRUITS: Primary | ICD-10-CM

## 2019-02-11 LAB
IRON SATN MFR SERPL: 22 % (ref 15–55)
IRON SERPL-MCNC: 58 UG/DL (ref 27–139)
SPECIMEN STATUS REPORT, ROLRST: NORMAL
TIBC SERPL-MCNC: 258 UG/DL (ref 250–450)
UIBC SERPL-MCNC: 200 UG/DL (ref 118–369)

## 2019-03-06 ENCOUNTER — HOSPITAL ENCOUNTER (OUTPATIENT)
Dept: VASCULAR SURGERY | Age: 84
Discharge: HOME | End: 2019-03-06
Attending: FAMILY MEDICINE
Payer: MEDICARE

## 2019-03-06 DIAGNOSIS — I65.23 BILATERAL CAROTID ARTERY STENOSIS: ICD-10-CM

## 2019-03-06 DIAGNOSIS — R09.89 BILATERAL CAROTID BRUITS: ICD-10-CM

## 2019-03-06 PROCEDURE — 93880 EXTRACRANIAL BILAT STUDY: CPT

## 2019-03-07 ENCOUNTER — TELEPHONE (OUTPATIENT)
Dept: FAMILY MEDICINE CLINIC | Age: 84
End: 2019-03-07

## 2019-03-07 LAB
LEFT CCA DIST DIAS: 13.6 CM/S
LEFT CCA DIST SYS: 82.6 CM/S
LEFT CCA PROX DIAS: 0 CM/S
LEFT CCA PROX SYS: 95 CM/S
LEFT ECA DIAS: 0 CM/S
LEFT ECA SYS: 70.9 CM/S
LEFT ICA DIST DIAS: 23.5 CM/S
LEFT ICA DIST SYS: 123.9 CM/S
LEFT ICA MID DIAS: 17.9 CM/S
LEFT ICA MID SYS: 76.5 CM/S
LEFT ICA PROX DIAS: 12.8 CM/S
LEFT ICA PROX SYS: 58 CM/S
LEFT ICA/CCA SYS: 1.5
LEFT SUBCLAVIAN DIAS: 0 CM/S
LEFT SUBCLAVIAN SYS: 225.9 CM/S
LEFT VERTEBRAL DIAS: 7.58 CM/S
LEFT VERTEBRAL SYS: 45 CM/S
RIGHT CCA DIST DIAS: 11.1 CM/S
RIGHT CCA DIST SYS: 74.1 CM/S
RIGHT CCA PROX DIAS: 16 CM/S
RIGHT CCA PROX SYS: 111.3 CM/S
RIGHT ECA DIAS: 0 CM/S
RIGHT ECA SYS: 90.5 CM/S
RIGHT ICA DIST DIAS: 20.8 CM/S
RIGHT ICA DIST SYS: 88.7 CM/S
RIGHT ICA MID DIAS: 19.2 CM/S
RIGHT ICA MID SYS: 79 CM/S
RIGHT ICA PROX DIAS: 11.4 CM/S
RIGHT ICA PROX SYS: 134.5 CM/S
RIGHT ICA/CCA SYS: 1.8
RIGHT SUBCLAVIAN DIAS: 14.38 CM/S
RIGHT SUBCLAVIAN SYS: 90.3 CM/S
RIGHT VERTEBRAL DIAS: 13.08 CM/S
RIGHT VERTEBRAL SYS: 63.6 CM/S

## 2019-03-07 NOTE — TELEPHONE ENCOUNTER
----- Message from Joshua Robb MD sent at 3/7/2019 10:55 AM EST -----  She has plaque(cholesterol deposits in carotids) but no significant blockage. No new treatment is needed.   Thank you,  Dr. Bridget Bowles

## 2019-03-07 NOTE — PROGRESS NOTES
She has plaque(cholesterol deposits in carotids) but no significant blockage. No new treatment is needed.   Thank you,  Dr. Ruchi Kingston

## 2019-03-07 NOTE — TELEPHONE ENCOUNTER
Called and spoke to patient. Advised per   Dr. Korin Da Silva:  *She has plaque(cholesterol deposits in carotids) but no significant blockage. No new treatment is needed.  *    She verbalized understanding

## 2019-03-12 ENCOUNTER — OFFICE VISIT (OUTPATIENT)
Dept: FAMILY MEDICINE CLINIC | Age: 84
End: 2019-03-12

## 2019-03-12 VITALS
SYSTOLIC BLOOD PRESSURE: 156 MMHG | TEMPERATURE: 97.9 F | HEIGHT: 63 IN | RESPIRATION RATE: 20 BRPM | WEIGHT: 110.6 LBS | DIASTOLIC BLOOD PRESSURE: 66 MMHG | BODY MASS INDEX: 19.6 KG/M2 | HEART RATE: 77 BPM | OXYGEN SATURATION: 99 %

## 2019-03-12 DIAGNOSIS — Z86.73 HX-TIA (TRANSIENT ISCHEMIC ATTACK): ICD-10-CM

## 2019-03-12 DIAGNOSIS — E78.00 PURE HYPERCHOLESTEROLEMIA: Chronic | ICD-10-CM

## 2019-03-12 DIAGNOSIS — I13.10 MALIGNANT HTN WITH HEART DISEASE, W/O CHF, WITH CHRONIC KIDNEY DISEASE: Primary | Chronic | ICD-10-CM

## 2019-03-12 DIAGNOSIS — D63.8 ANEMIA, CHRONIC DISEASE: ICD-10-CM

## 2019-03-12 DIAGNOSIS — E03.4 HYPOTHYROIDISM DUE TO ACQUIRED ATROPHY OF THYROID: Chronic | ICD-10-CM

## 2019-03-12 DIAGNOSIS — N18.4 CHRONIC RENAL IMPAIRMENT, STAGE 4 (SEVERE) (HCC): Chronic | ICD-10-CM

## 2019-03-12 NOTE — PATIENT INSTRUCTIONS
Billy Simpson with [facility]  [address] [place] [address], [facility] [address] [place] [address] [place] [address]  [phone]    Monitor blood pressure outside the office several times weekly at different times during the day and evening. Bring the record to me in 3 weeks for review.     Blood Pressure Record     Patient Name:  ______________________ :  ______________________    Date/Time BP Reading Pulse

## 2019-03-12 NOTE — PROGRESS NOTES
[facility], [address] [facility]  [phone]           Progress Note    Patient: Herbert Rahman MRN: [id number]  SSN: [id number]    YOB: 1923  Age: 80 y.o. Sex: female        Chief Complaint   Patient presents with    Hypertension         Subjective:     Encounter Diagnoses   Name Primary?  Malignant HTN with heart disease, w/o CHF, with chronic kidney disease: He has a significant systolic blood pressure drop when she stands up. Her blood pressures from home are much better than what it is here. We need her to record standing blood pressure from home. BP Readings from Last 3 Encounters:   03/12/19 156/66   02/06/19 167/62   01/25/19 189/61     The patient reports:  taking medications as instructed, no medication side effects noted, no TIA's, no chest pain on exertion, no dyspnea on exertion, no swelling of ankles. Key CAD CHF Meds             felodipine (PLENDIL SR) 5 mg 24 hr tablet  (Taking) Take one tablet by mouth daily. APPT REQUIRED PRIOR TO FURTHER REFILLS. PLEASE CALL TO SCHEDULE    hydrALAZINE (APRESOLINE) 50 mg tablet  (Taking) TAKE ONE TABLET BY MOUTH 4 TIMES DAILY FOR  HYPERTENSION    furosemide (LASIX) 40 mg tablet  (Taking) TAKE 1 TABLET BY MOUTH ONCE DAILY    simvastatin (ZOCOR) 40 mg tablet  (Taking) TAKE 1 TABLET BY MOUTH EACH NIGHT FOR MIXED HYPERLIPIDEMIA    losartan (COZAAR) 100 mg tablet  (Taking) Take 1 Tab by mouth daily. omega-3 fatty acids-vitamin e (FISH OIL) 1,000 mg Cap  (Taking) Take 1 Cap by mouth. aspirin delayed-release 81 mg tablet  (Taking) Take 81 mg by mouth daily. simvastatin (ZOCOR) 40 mg tablet Take 1 Tab by mouth nightly for 90 days. Indications: mixed hyperlipidemia    nitroglycerin (NITROBID) 2 % ointment Apply 0.5 Inches to affected area every four (4) hours. Apply 1/2 inch to the chest when systolic blood pressure is > 170.            Lab Results   Component Value Date/Time    Sodium 145 (H) 02/06/2019 12:35 PM    Potassium 4.2 02/06/2019 12:35 PM    Chloride 108 (H) 02/06/2019 12:35 PM    CO2 19 (L) 02/06/2019 12:35 PM    Anion gap 10 10/06/2010 10:34 AM    Glucose 92 02/06/2019 12:35 PM    BUN 52 (H) 02/06/2019 12:35 PM    Creatinine 2.82 (H) 02/06/2019 12:35 PM    BUN/Creatinine ratio 18 02/06/2019 12:35 PM    GFR est AA 16 (L) 02/06/2019 12:35 PM    GFR est non-AA 14 (L) 02/06/2019 12:35 PM    Calcium 11.1 (H) 02/06/2019 12:35 PM    Bilirubin, total 0.4 07/11/2018 09:36 AM    AST (SGOT) 12 02/06/2019 12:35 PM    Alk. phosphatase 79 07/11/2018 09:36 AM    Protein, total 7.4 07/11/2018 09:36 AM    Albumin 4.4 02/06/2019 12:35 PM    Globulin 3.2 10/06/2010 10:34 AM    A-G Ratio 1.6 07/11/2018 09:36 AM    ALT (SGPT) 10 02/06/2019 12:35 PM     Low salt diet? yes  Aerobic exercise? No  Our goal is to normalize the blood pressure to decrease the risks of strokes and heart attacks. The patient is in agreement with the plan. Yes    Hypothyroidism due to acquired atrophy of thyroid:  Lab Results   Component Value Date/Time    TSH 3.450 06/12/2018 10:10 AM    T4, Free 1.22 02/06/2019 12:35 PM      Denies fatigue, nervousness,weight changes, heat orcold intolerance, bowel changes,skin changes, cardiovascular symptoms, hair loss, feeling excessive energy, tremor, palpitations and weight loss. Thyroid medication has been unchanged since last medication check and labs.  Chronic renal impairment, stage 4 (severe) ([facility]):  Nephrologist:   Lab Results   Component Value Date/Time    GFR est AA 16 (L) 02/06/2019 12:35 PM    GFR est non-AA 14 (L) 02/06/2019 12:35 PM    Creatinine 2.82 (H) 02/06/2019 12:35 PM    BUN 52 (H) 02/06/2019 12:35 PM    Sodium 145 (H) 02/06/2019 12:35 PM    Potassium 4.2 02/06/2019 12:35 PM    Chloride 108 (H) 02/06/2019 12:35 PM    CO2 19 (L) 02/06/2019 12:35 PM            Anemia, chronic disease:  No sx.   Lab Results   Component Value Date/Time HGB 10.2 (L) 02/06/2019 12:35 PM     Lab Results   Component Value Date/Time    Iron 58 02/06/2019 12:35 PM    TIBC 258 02/06/2019 12:35 PM    Iron % saturation 22 02/06/2019 12:35 PM    Ferritin 346 (H) 05/15/2012 09:54 AM            Hx-TIA (transient ischemic attack): No recurrence.  Pure hypercholesterolemia:  Cardiovascular risks for her are: LDL goal is under 100  hypertension  hyperlipidemia. Key Antihyperlipidemia Meds             simvastatin (ZOCOR) 40 mg tablet  (Taking) TAKE 1 TABLET BY MOUTH EACH NIGHT FOR MIXED HYPERLIPIDEMIA    omega-3 fatty acids-vitamin e (FISH OIL) 1,000 mg Cap  (Taking) Take 1 Cap by mouth. simvastatin (ZOCOR) 40 mg tablet Take 1 Tab by mouth nightly for 90 days. Indications: mixed hyperlipidemia        Lab Results   Component Value Date/Time    Cholesterol, total 188 02/06/2019 12:35 PM    HDL Cholesterol 63 02/06/2019 12:35 PM    LDL, calculated 104 (H) 02/06/2019 12:35 PM    Triglyceride 104 02/06/2019 12:35 PM    CHOL/HDL Ratio 3.3 10/06/2010 10:34 AM     Lab Results   Component Value Date/Time    ALT (SGPT) 10 02/06/2019 12:35 PM    AST (SGOT) 12 02/06/2019 12:35 PM    Alk. phosphatase 79 07/11/2018 09:36 AM    Bilirubin, total 0.4 07/11/2018 09:36 AM      Myalgias: No   Fatigue: No   Other side effects: no  Wt Readings from Last 3 Encounters:   03/12/19 110 lb 9.6 oz (50.2 kg)   02/06/19 111 lb 3.2 oz (50.4 kg)   01/25/19 113 lb (51.3 kg)     The patient is aware of our goal to reduce or eliminate the long term problems (such as strokes and heart attacks) related to poorly controlled hyperlipidemia. Current and past medical information:    Current Medications after this visit[de-identified]     Current Outpatient Medications   Medication Sig    traMADol (ULTRAM) 50 mg tablet Take 1 Tab by mouth every six (6) hours as needed for Pain. Max Daily Amount: 200 mg.    felodipine (PLENDIL SR) 5 mg 24 hr tablet Take one tablet by mouth daily.  APPT REQUIRED PRIOR TO FURTHER REFILLS. PLEASE CALL TO SCHEDULE    hydrALAZINE (APRESOLINE) 50 mg tablet TAKE ONE TABLET BY MOUTH 4 TIMES DAILY FOR  HYPERTENSION    furosemide (LASIX) 40 mg tablet TAKE 1 TABLET BY MOUTH ONCE DAILY    simvastatin (ZOCOR) 40 mg tablet TAKE 1 TABLET BY MOUTH EACH NIGHT FOR MIXED HYPERLIPIDEMIA    levothyroxine (SYNTHROID) 25 mcg tablet TAKE ONE TABLET BY MOUTH ONCE DAILY BEFORE BREAKFAST    losartan (COZAAR) 100 mg tablet Take 1 Tab by mouth daily.  omega-3 fatty acids-vitamin e (FISH OIL) 1,000 mg Cap Take 1 Cap by mouth.  aspirin delayed-release 81 mg tablet Take 81 mg by mouth daily.  cholecalciferol, vitamin d3, (VITAMIN D) 1,000 unit tablet Take 1,000 Units by mouth daily.  cyanocobalamin (VITAMIN B-12) 1,000 mcg tablet Take 1,000 mcg by mouth daily.  diclofenac (VOLTAREN) 1 % gel Apply  to affected area four (4) times daily.  simvastatin (ZOCOR) 40 mg tablet Take 1 Tab by mouth nightly for 90 days. Indications: mixed hyperlipidemia    nitroglycerin (NITROBID) 2 % ointment Apply 0.5 Inches to affected area every four (4) hours. Apply 1/2 inch to the chest when systolic blood pressure is > 170.  albuterol (PROVENTIL HFA, VENTOLIN HFA, PROAIR HFA) 90 mcg/actuation inhaler Take 2 Puffs by inhalation every six (6) hours as needed for Wheezing (use her insurance's generic).  fexofenadine (ALLEGRA) 180 mg tablet Take 1 Tab by mouth daily. For allergies. No current facility-administered medications for this visit.         Patient Active Problem List    Diagnosis Date Noted    Anemia, chronic disease 03/12/2019     Priority: 1 - One    Malignant HTN with heart disease, w/o CHF, with chronic kidney disease 05/26/2017     Priority: 1 - One    Hypothyroidism 08/05/2013     Priority: 1 - One    CRI (chronic renal insufficiency) 02/08/2011     Priority: 1 - One    Hyperlipidemia 05/24/2010     Priority: 1 - One    Arthritis 05/24/2010     Priority: 1 - One    Hx-TIA (transient ischemic attack) 05/24/2010     Priority: 1 - One    Migraine 05/24/2010     Priority: 1 - One    Situational stress 06/12/2018     Priority: 6 - Six    S/P hysterectomy 10/10/2014     Priority: 6 - Six       Past Medical History:   Diagnosis Date    Arthritis 5/24/2010    Hx-TIA (transient ischemic attack) 5/24/2010    Hyperlipidemia 5/24/2010    Hypertension 5/24/2010    Migraine 5/24/2010       Allergies   Allergen Reactions    Ace Inhibitors Other (comments)     Cough. Also has unclear reaction to ARB's       Past Surgical History:   Procedure Laterality Date    HX GYN      hysterectomy,btl    HX GYN  9/6/11    complete hysterectomy    HX HEENT  10/10. bilateral cataract removal.       Social History     Socioeconomic History    Marital status:      Spouse name: Not on file    Number of children: Not on file    Years of education: Not on file    Highest education level: Not on file   Tobacco Use    Smoking status: Never Smoker    Smokeless tobacco: Never Used   Substance and Sexual Activity    Alcohol use: No    Drug use: No       Review of Systems   Constitutional: Negative. Negative for chills, fever, malaise/fatigue and weight loss. HENT: Negative. Negative for hearing loss. Eyes: Negative. Negative for blurred vision and double vision. Respiratory: Negative. Negative for cough, hemoptysis, sputum production and shortness of breath. Cardiovascular: Negative. Negative for chest pain, palpitations and orthopnea. Gastrointestinal: Negative. Negative for abdominal pain, blood in stool, heartburn, nausea and vomiting. Genitourinary: Negative. Negative for dysuria, frequency and urgency. Musculoskeletal: Negative. Negative for back pain, myalgias and neck pain. Skin: Negative. Negative for rash. Neurological: Negative. Negative for dizziness, tingling, tremors, weakness and headaches. Endo/Heme/Allergies: Negative.     Psychiatric/Behavioral: Negative. Negative for depression. Objective:     Vitals:    03/12/19 1125 03/12/19 1153 03/12/19 1154   BP: 199/44 186/63 156/66   Pulse: 61 72 77   Resp: 20     Temp: 97.9 °F (36.6 °C)     TempSrc: Oral     SpO2: 99%     Weight: 110 lb 9.6 oz (50.2 kg)     Height: 5' 3\" (1.6 m)        Body mass index is 19.59 kg/m². Physical Exam   Constitutional: She is oriented to person, place, and time and well-developed, well-nourished, and in no distress. No distress. HENT:   Head: Normocephalic and atraumatic. Mouth/Throat: Oropharynx is clear and moist.   Eyes: Conjunctivae are normal.   Neck: No thyromegaly present. Cardiovascular: Normal rate and regular rhythm. Murmur heard. 1-2/6 AN. RSB. Pulmonary/Chest: Effort normal and breath sounds normal. No respiratory distress. Abdominal: Soft. She exhibits no distension. There is no tenderness. There is no rebound. Musculoskeletal: She exhibits no edema. Neurological: She is alert and oriented to person, place, and time. Skin: Skin is warm. No rash noted. She is not diaphoretic. No erythema. Psychiatric: Mood and affect normal.   Nursing note and vitals reviewed. Health Maintenance Due   Topic Date Due    Shingrix Vaccine Age 49> (1 of 2) 07/17/1973    Influenza Age 5 to Adult  08/01/2018    MEDICARE YEARLY EXAM  04/11/2019         Assessment and orders:     Encounter Diagnoses     ICD-10-CM ICD-9-CM   1. 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 Hypothyroidism due to acquired atrophy of thyroid E03.4 244.8     246.8   3. Chronic renal impairment, stage 4 (severe) (HCC) N18.4 585.4   4. Anemia, chronic disease D63.8 285.29   5. Hx-TIA (transient ischemic attack) Z86.73 V12.54   6. Pure hypercholesterolemia E78.00 272.0     Diagnoses and all orders for this visit:    1.  Malignant HTN with heart disease, w/o CHF, with chronic kidney disease  -     RENAL FUNCTION PANEL  - HEMOGLOBIN    2. Hypothyroidism due to acquired atrophy of thyroid    3. Chronic renal impairment, stage 4 (severe) (HCC)  -     RENAL FUNCTION PANEL  -     HEMOGLOBIN    4. Anemia, chronic disease  -     HEMOGLOBIN    5. Hx-TIA (transient ischemic attack)    6. Pure hypercholesterolemia            Plan of care:  Discussed diagnoses in detail with patient. Medication risks/benefits/side effects discussed with patient. All of the patient's questions were addressed. The patient understands and agrees with our plan of care. The patient knows to call back if they are unsure of or forget any changes we discussed today or if the symptoms change. The patient received an After-Visit Summary which contains VS, orders, medication list and allergy list. This can be used as a \"mini-medical record\" should they have to seek medical care while out of town. Patient Care Team:  Bogdan De La Fuente MD as PCP - General    Follow-up Disposition:  Return in about 4 weeks (around 4/9/2019). Future Appointments   Date Time Provider Tiago Mooni   4/11/2019 11:00 AM Franki Card MD [facility]   4/15/2019  1:25 PM Bogdan De La Fuente MD [address]       Signed By: Josi Valenzuela MD     March 12, 2019        This note was created using voice recognition software. Despite editing, there may be syntax errors.

## 2019-03-12 NOTE — PROGRESS NOTES
Chief Complaint   Patient presents with    Hypertension     Body mass index is 19.59 kg/m². 1. Have you been to the ER, urgent care clinic since your last visit? Hospitalized since your last visit? No    2. Have you seen or consulted any other health care providers outside of the [address] [place] [address] since your last visit? Include any pap smears or colon screening.  No    Reviewed record in preparation for visit and have necessary documentation  Pt did not bring medication to office visit for review  Information was given to pt on Advanced Directives, Living Will  Information was given on Shingles Vaccine  Opportunity was given for questions  Goals that were addressed and/or need to be completed after this appointment include:   Health Maintenance Due   Topic Date Due    Shingrix Vaccine Age 50> (1 of 2) 07/17/1973    Influenza Age 5 to Adult  08/01/2018    MEDICARE YEARLY EXAM  04/11/2019

## 2019-03-13 ENCOUNTER — TELEPHONE (OUTPATIENT)
Dept: FAMILY MEDICINE CLINIC | Age: 84
End: 2019-03-13

## 2019-03-13 LAB
ALBUMIN SERPL-MCNC: 4.3 G/DL (ref 3.2–4.6)
BUN SERPL-MCNC: 46 MG/DL (ref 10–36)
BUN/CREAT SERPL: 15 (ref 12–28)
CALCIUM SERPL-MCNC: 10.7 MG/DL (ref 8.7–10.3)
CHLORIDE SERPL-SCNC: 105 MMOL/L (ref 96–106)
CO2 SERPL-SCNC: 20 MMOL/L (ref 20–29)
CREAT SERPL-MCNC: 3.03 MG/DL (ref 0.57–1)
GLUCOSE SERPL-MCNC: 87 MG/DL (ref 65–99)
HGB BLD-MCNC: 10.1 G/DL (ref 11.1–15.9)
INTERPRETATION: NORMAL
PHOSPHATE SERPL-MCNC: 4.1 MG/DL (ref 2.5–4.5)
POTASSIUM SERPL-SCNC: 4.3 MMOL/L (ref 3.5–5.2)
SODIUM SERPL-SCNC: 141 MMOL/L (ref 134–144)

## 2019-03-13 NOTE — TELEPHONE ENCOUNTER
Unable to reach patient or patients daughterEmigdio by telephone. Detailed lab letter mailed to patient informing her of the following:    \"With exception of a slight decrease in kidney function your labs are stable. I submitted a consult to nephrology in February. Please let me know if this is not been scheduled. \"    There is a note in the patient's Nephrology referral that states the following: \"Received notice that their office had not been able to get in touch with the patient. Letter sent to patient asking her to contact their office to schedule the appointment. \"

## 2019-03-13 NOTE — TELEPHONE ENCOUNTER
Called and left message asking for a return call reguarding from the daughter, Adriana Em, in reference to the patient being seen by Nephrology.

## 2019-03-14 NOTE — TELEPHONE ENCOUNTER
Patient's daughter came in today. Gave her the phone number to call and schedule an appointment as we had previously sent the information to Nephrology. Will fax most recent labs as well.

## 2019-03-28 RX ORDER — FUROSEMIDE 20 MG/1
TABLET ORAL
Qty: 60 TAB | Refills: 2 | Status: SHIPPED | OUTPATIENT
Start: 2019-03-28 | End: 2019-10-14 | Stop reason: SDUPTHER

## 2019-04-11 ENCOUNTER — OFFICE VISIT (OUTPATIENT)
Dept: FAMILY MEDICINE CLINIC | Age: 84
End: 2019-04-11

## 2019-04-11 VITALS
TEMPERATURE: 98 F | DIASTOLIC BLOOD PRESSURE: 56 MMHG | SYSTOLIC BLOOD PRESSURE: 182 MMHG | BODY MASS INDEX: 20.02 KG/M2 | WEIGHT: 113 LBS | OXYGEN SATURATION: 98 % | HEIGHT: 63 IN | HEART RATE: 55 BPM | RESPIRATION RATE: 16 BRPM

## 2019-04-11 DIAGNOSIS — E03.4 HYPOTHYROIDISM DUE TO ACQUIRED ATROPHY OF THYROID: Chronic | ICD-10-CM

## 2019-04-11 DIAGNOSIS — I13.10 MALIGNANT HTN WITH HEART DISEASE, W/O CHF, WITH CHRONIC KIDNEY DISEASE: Primary | ICD-10-CM

## 2019-04-11 RX ORDER — LOSARTAN POTASSIUM 100 MG/1
100 TABLET ORAL DAILY
Qty: 90 TAB | Refills: 1 | Status: SHIPPED | OUTPATIENT
Start: 2019-04-11 | End: 2019-05-09 | Stop reason: SDUPTHER

## 2019-04-11 RX ORDER — FELODIPINE 5 MG/1
TABLET, EXTENDED RELEASE ORAL
Qty: 90 TAB | Refills: 1 | Status: SHIPPED | OUTPATIENT
Start: 2019-04-11 | End: 2019-01-01 | Stop reason: SDUPTHER

## 2019-04-11 RX ORDER — LEVOTHYROXINE SODIUM 25 UG/1
TABLET ORAL
Qty: 90 TAB | Refills: 1 | Status: SHIPPED | OUTPATIENT
Start: 2019-04-11 | End: 2019-07-10 | Stop reason: SDUPTHER

## 2019-04-11 NOTE — PROGRESS NOTES
[address] [place] [address] Residency Program,    Yakelin Begum [address] with [facility] and Critical access hospital Note   Subjective:   Kaz Ball is a 80 y.o. female presents for evaluation of routine follow up and medication refill  CC:\"I am doing fine \"  History provided by patient     HPI:  Pt is requesting refill htn meds plendil and cozaar. She brought her blood pressure log with bp ranging 130s-180s/80-100s,with more 150s/80-90  Readings. Pt has been compliant with all medications with help of her daughter. Pt also needs a refill on synthroid 25mcg. She reports good appetite,energy level. Denies issues with constipation. Current Outpatient Medications on File Prior to Visit   Medication Sig Dispense Refill    furosemide (LASIX) 20 mg tablet TAKE 2 TABLETS BY MOUTH ONCE DAILY 60 Tab 2    traMADol (ULTRAM) 50 mg tablet Take 1 Tab by mouth every six (6) hours as needed for Pain. Max Daily Amount: 200 mg. 60 Tab 2    hydrALAZINE (APRESOLINE) 50 mg tablet TAKE ONE TABLET BY MOUTH 4 TIMES DAILY FOR  HYPERTENSION 120 Tab 5    simvastatin (ZOCOR) 40 mg tablet TAKE 1 TABLET BY MOUTH EACH NIGHT FOR MIXED HYPERLIPIDEMIA 90 Tab 2    omega-3 fatty acids-vitamin e (FISH OIL) 1,000 mg Cap Take 1 Cap by mouth.  aspirin delayed-release 81 mg tablet Take 81 mg by mouth daily.  cholecalciferol, vitamin d3, (VITAMIN D) 1,000 unit tablet Take 1,000 Units by mouth daily.  cyanocobalamin (VITAMIN B-12) 1,000 mcg tablet Take 1,000 mcg by mouth daily.  diclofenac (VOLTAREN) 1 % gel Apply  to affected area four (4) times daily. 100 g 0    simvastatin (ZOCOR) 40 mg tablet Take 1 Tab by mouth nightly for 90 days. Indications: mixed hyperlipidemia 90 Tab 2    nitroglycerin (NITROBID) 2 % ointment Apply 0.5 Inches to affected area every four (4) hours.  Apply 1/2 inch to the chest when systolic blood pressure is > 170. 60 g 0    albuterol (PROVENTIL HFA, VENTOLIN HFA, PROAIR HFA) 90 mcg/actuation inhaler Take 2 Puffs by inhalation every six (6) hours as needed for Wheezing (use her insurance's generic). 1 Inhaler 5    fexofenadine (ALLEGRA) 180 mg tablet Take 1 Tab by mouth daily. For allergies. 30 Tab 5     No current facility-administered medications on file prior to visit. Past Medical History:   Diagnosis Date    Arthritis 5/24/2010    Hx-TIA (transient ischemic attack) 5/24/2010    Hyperlipidemia 5/24/2010    Hypertension 5/24/2010    Migraine 5/24/2010       Social History     Socioeconomic History    Marital status:      Spouse name: Not on file    Number of children: Not on file    Years of education: Not on file    Highest education level: Not on file   Occupational History    Not on file   Social Needs    Financial resource strain: Not on file    Food insecurity:     Worry: Not on file     Inability: Not on file    Transportation needs:     Medical: Not on file     Non-medical: Not on file   Tobacco Use    Smoking status: Never Smoker    Smokeless tobacco: Never Used   Substance and Sexual Activity    Alcohol use: No    Drug use: No    Sexual activity: Not on file   Lifestyle    Physical activity:     Days per week: Not on file     Minutes per session: Not on file    Stress: Not on file   Relationships    Social connections:     Talks on phone: Not on file     Gets together: Not on file     Attends Church service: Not on file     Active member of club or organization: Not on file     Attends meetings of clubs or organizations: Not on file     Relationship status: Not on file    Intimate partner violence:     Fear of current or ex partner: Not on file     Emotionally abused: Not on file     Physically abused: Not on file     Forced sexual activity: Not on file   Other Topics Concern    Not on file   Social History Narrative    Not on file       Review of Systems   Constitutional: Negative for chills and fever.    HENT: Negative for congestion and sinus pain. Respiratory: Negative for cough, hemoptysis and shortness of breath. Cardiovascular: Negative for chest pain. Gastrointestinal: Negative for abdominal pain, constipation, diarrhea, heartburn, nausea and vomiting. Musculoskeletal: Negative for myalgias. Skin: Negative for itching and rash. Neurological: Negative for dizziness, tingling, sensory change, focal weakness and headaches. Objective:     Visit Vitals  /56 (BP 1 Location: Left arm, BP Patient Position: Sitting)   Pulse (!) 55   Temp 98 °F (36.7 °C) (Oral)   Resp 16   Ht 5' 3\" (1.6 m)   Wt 113 lb (51.3 kg)   SpO2 98%   BMI 20.02 kg/m²      Physical Exam:  Physical Exam   Constitutional: She is oriented to person, place, and time. She appears well-developed and well-nourished. HENT:   Head: Normocephalic and atraumatic. Eyes: Pupils are equal, round, and reactive to light. Conjunctivae are normal. Right eye exhibits no discharge. Left eye exhibits no discharge. No scleral icterus. Neck: Normal range of motion. Neck supple. Cardiovascular: Normal rate, regular rhythm, normal heart sounds and intact distal pulses. Exam reveals no gallop and no friction rub. No murmur heard. Pulmonary/Chest: Effort normal and breath sounds normal. No respiratory distress. She has no wheezes. She has no rales. She exhibits no tenderness. Abdominal: Soft. Bowel sounds are normal. There is no tenderness. Musculoskeletal: She exhibits no edema, tenderness or deformity. Neurological: She is alert and oriented to person, place, and time. No cranial nerve deficit. Skin: Skin is warm and dry. No rash noted. No erythema. Nursing note and vitals reviewed. Assessment and orders:       ICD-10-CM ICD-9-CM    1. 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  Hypothyroidism due to acquired atrophy of thyroid E03.4 244.8 levothyroxine (SYNTHROID) 25 mcg tablet     246.8      Diagnoses and all orders for this visit:    1. 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 -     losartan (COZAAR) 100 mg tablet; Take 1 Tab by mouth daily. 2. Hypothyroidism due to acquired atrophy of thyroid  -     levothyroxine (SYNTHROID) 25 mcg tablet; TAKE ONE TABLET BY MOUTH ONCE DAILY BEFORE BREAKFAST      Follow-up and Dispositions    · Return in about 1 month (around 5/9/2019) for blood pressure check . I have reviewed patient medical and social history and medications. I have reviewed pertinent labs results and other data. I have discussed the diagnosis with the patient and the intended plan as seen in the above orders. The patient has received an after-visit summary and questions were answered concerning future plans. I have discussed medication side effects and warnings with the patient as well.     Jolly Kehr, MD  Resident [facility]  04/15/19

## 2019-04-11 NOTE — PATIENT INSTRUCTIONS
High Blood Pressure: Care Instructions  Overview    It's normal for blood pressure to go up and down throughout the day. But if it stays up, you have high blood pressure. Another name for high blood pressure is hypertension. Despite what a lot of people think, high blood pressure usually doesn't cause headaches or make you feel dizzy or lightheaded. It usually has no symptoms. But it does increase your risk of stroke, heart attack, and other problems. You and your doctor will talk about your risks of these problems based on your blood pressure. Your doctor will give you a goal for your blood pressure. Your goal will be based on your health and your age. Lifestyle changes, such as eating healthy and being active, are always important to help lower blood pressure. You might also take medicine to reach your blood pressure goal.  Follow-up care is a key part of your treatment and safety. Be sure to make and go to all appointments, and call your doctor if you are having problems. It's also a good idea to know your test results and keep a list of the medicines you take. How can you care for yourself at home? Medical treatment  · If you stop taking your medicine, your blood pressure will go back up. You may take one or more types of medicine to lower your blood pressure. Be safe with medicines. Take your medicine exactly as prescribed. Call your doctor if you think you are having a problem with your medicine. · Talk to your doctor before you start taking aspirin every day. Aspirin can help certain people lower their risk of a heart attack or stroke. But taking aspirin isn't right for everyone, because it can cause serious bleeding. · See your doctor regularly. You may need to see the doctor more often at first or until your blood pressure comes down. · If you are taking blood pressure medicine, talk to your doctor before you take decongestants or anti-inflammatory medicine, such as ibuprofen.  Some of these medicines can raise blood pressure. · Learn how to check your blood pressure at home. Lifestyle changes  · Stay at a healthy weight. This is especially important if you put on weight around the waist. Losing even 10 pounds can help you lower your blood pressure. · If your doctor recommends it, get more exercise. Walking is a good choice. Bit by bit, increase the amount you walk every day. Try for at least 30 minutes on most days of the week. You also may want to swim, bike, or do other activities. · Avoid or limit alcohol. Talk to your doctor about whether you can drink any alcohol. · Try to limit how much sodium you eat to less than 2,300 milligrams (mg) a day. Your doctor may ask you to try to eat less than 1,500 mg a day. · Eat plenty of fruits (such as bananas and oranges), vegetables, legumes, whole grains, and low-fat dairy products. · Lower the amount of saturated fat in your diet. Saturated fat is found in animal products such as milk, cheese, and meat. Limiting these foods may help you lose weight and also lower your risk for heart disease. · Do not smoke. Smoking increases your risk for heart attack and stroke. If you need help quitting, talk to your doctor about stop-smoking programs and medicines. These can increase your chances of quitting for good. When should you call for help? Call 911 anytime you think you may need emergency care. This may mean having symptoms that suggest that your blood pressure is causing a serious heart or blood vessel problem. Your blood pressure may be over 180/120.   For example, call 911 if:    · You have symptoms of a heart attack. These may include:  ? Chest pain or pressure, or a strange feeling in the chest.  ? Sweating. ? Shortness of breath. ? Nausea or vomiting. ? Pain, pressure, or a strange feeling in the back, neck, jaw, or upper belly or in one or both shoulders or arms. ? Lightheadedness or sudden weakness.   ? A fast or irregular heartbeat.     · You have symptoms of a stroke. These may include:  ? Sudden numbness, tingling, weakness, or loss of movement in your face, arm, or leg, especially on only one side of your body. ? Sudden vision changes. ? Sudden trouble speaking. ? Sudden confusion or trouble understanding simple statements. ? Sudden problems with walking or balance. ? A sudden, severe headache that is different from past headaches.     · You have severe back or belly pain.    Do not wait until your blood pressure comes down on its own. Get help right away.   Call your doctor now or seek immediate care if:    · Your blood pressure is much higher than normal (such as 180/120 or higher), but you don't have symptoms.     · You think high blood pressure is causing symptoms, such as:  ? Severe headache.  ? Blurry vision.    Watch closely for changes in your health, and be sure to contact your doctor if:    · Your blood pressure measures higher than your doctor recommends at least 2 times. That means the top number is higher or the bottom number is higher, or both.     · You think you may be having side effects from your blood pressure medicine. Where can you learn more? Go to http://rafi-alejandro.info/. Enter G789 in the search box to learn more about \"High Blood Pressure: Care Instructions. \"  Current as of: July 22, 2018  Content Version: 11.9  © 8546-7065 [employer]. Care instructions adapted under license by [employer] (which disclaims liability or warranty for this information). If you have questions about a medical condition or this instruction, always ask your healthcare professional. [place] [address] any warranty or liability for your use of this information. Low Sodium Diet (2,000 Milligram): Care Instructions  Your Care Instructions    Too much sodium causes your body to hold on to extra water.  This can raise your blood pressure and force your heart and kidneys to work harder. In very serious cases, this could cause you to be put in the hospital. It might even be life-threatening. By limiting sodium, you will feel better and lower your risk of serious problems. The most common source of sodium is salt. People get most of the salt in their diet from canned, prepared, and packaged foods. Fast food and restaurant meals also are very high in sodium. Your doctor will probably limit your sodium to less than 2,000 milligrams (mg) a day. This limit counts all the sodium in prepared and packaged foods and any salt you add to your food. Follow-up care is a key part of your treatment and safety. Be sure to make and go to all appointments, and call your doctor if you are having problems. It's also a good idea to know your test results and keep a list of the medicines you take. How can you care for yourself at home? Read food labels  · Read labels on cans and food packages. The labels tell you how much sodium is in each serving. Make sure that you look at the serving size. If you eat more than the serving size, you have eaten more sodium. · Food labels also tell you the Percent Daily Value for sodium. Choose products with low Percent Daily Values for sodium. · Be aware that sodium can come in forms other than salt, including monosodium glutamate (MSG), sodium citrate, and sodium bicarbonate (baking soda). MSG is often added to Asian food. When you eat out, you can sometimes ask for food without MSG or added salt. Buy low-sodium foods  · Buy foods that are labeled \"unsalted\" (no salt added), \"sodium-free\" (less than 5 mg of sodium per serving), or \"low-sodium\" (less than 140 mg of sodium per serving). Foods labeled \"reduced-sodium\" and \"light sodium\" may still have too much sodium. Be sure to read the label to see how much sodium you are getting. · Buy fresh vegetables, or frozen vegetables without added sauces.  Buy low-sodium versions of canned vegetables, soups, and other canned goods.  Prepare low-sodium meals  · Cut back on the amount of salt you use in cooking. This will help you adjust to the taste. Do not add salt after cooking. One teaspoon of salt has about 2,300 mg of sodium. · Take the salt shaker off the table. · Flavor your food with garlic, lemon juice, onion, vinegar, herbs, and spices. Do not use soy sauce, lite soy sauce, steak sauce, onion salt, garlic salt, celery salt, mustard, or ketchup on your food. · Use low-sodium salad dressings, sauces, and ketchup. Or make your own salad dressings and sauces without adding salt. · Use less salt (or none) when recipes call for it. You can often use half the salt a recipe calls for without losing flavor. Other foods such as rice, pasta, and grains do not need added salt. · Rinse canned vegetables, and cook them in fresh water. This removes some--but not all--of the salt. · Avoid water that is naturally high in sodium or that has been treated with water softeners, which add sodium. Call your local water company to find out the sodium content of your water supply. If you buy bottled water, read the label and choose a sodium-free brand. Avoid high-sodium foods  · Avoid eating:  ? Smoked, cured, salted, and canned meat, fish, and poultry. ? Ham, aggarwal, hot dogs, and luncheon meats. ? Regular, hard, and processed cheese and regular peanut butter. ? Crackers with salted tops, and other salted snack foods such as pretzels, chips, and salted popcorn. ? Frozen prepared meals, unless labeled low-sodium. ? Canned and dried soups, broths, and bouillon, unless labeled sodium-free or low-sodium. ? Canned vegetables, unless labeled sodium-free or low-sodium. ? [place] fries, pizza, tacos, and other fast foods. ? Pickles, olives, ketchup, and other condiments, especially soy sauce, unless labeled sodium-free or low-sodium. Where can you learn more? Go to http://rafi-alejandro.info/.   Enter Q045 in the search box to learn more about \"Low Sodium Diet (2,000 Milligram): Care Instructions. \"  Current as of: March 28, 2018  Content Version: 11.9  © 4407-7924 [employer]. Care instructions adapted under license by [employer] (which disclaims liability or warranty for this information). If you have questions about a medical condition or this instruction, always ask your healthcare professional. [place] [address] any warranty or liability for your use of this information.

## 2019-04-11 NOTE — PROGRESS NOTES
1. Have you been to the ER, urgent care clinic since your last visit? Hospitalized since your last visit? No    2. Have you seen or consulted any other health care providers outside of the [address] [place] [address] since your last visit? Include any pap smears or colon screening.  No  Reviewed record in preparation for visit and have necessary documentation      Goals that were addressed and/or need to be completed during or after this appointment include     Health Maintenance Due   Topic Date Due    Shingrix Vaccine Age 49> (1 of 2) 07/17/1973    Pneumococcal 65+ years (2 of 2 - PPSV23) 12/10/2016    MEDICARE YEARLY EXAM  04/11/2019

## 2019-04-15 ENCOUNTER — OFFICE VISIT (OUTPATIENT)
Dept: FAMILY MEDICINE CLINIC | Age: 84
End: 2019-04-15

## 2019-04-15 VITALS
WEIGHT: 111.4 LBS | HEART RATE: 58 BPM | SYSTOLIC BLOOD PRESSURE: 152 MMHG | OXYGEN SATURATION: 100 % | RESPIRATION RATE: 18 BRPM | BODY MASS INDEX: 19.74 KG/M2 | DIASTOLIC BLOOD PRESSURE: 55 MMHG | HEIGHT: 63 IN | TEMPERATURE: 97.5 F

## 2019-04-15 DIAGNOSIS — Z00.00 MEDICARE ANNUAL WELLNESS VISIT, SUBSEQUENT: Primary | ICD-10-CM

## 2019-04-15 DIAGNOSIS — N18.4 CKD (CHRONIC KIDNEY DISEASE) STAGE 4, GFR 15-29 ML/MIN (HCC): ICD-10-CM

## 2019-04-15 DIAGNOSIS — N18.4 CKD (CHRONIC KIDNEY DISEASE) STAGE 4, GFR 15-29 ML/MIN (HCC): Primary | ICD-10-CM

## 2019-04-15 DIAGNOSIS — E03.4 HYPOTHYROIDISM DUE TO ACQUIRED ATROPHY OF THYROID: Chronic | ICD-10-CM

## 2019-04-15 DIAGNOSIS — N18.4 CHRONIC RENAL IMPAIRMENT, STAGE 4 (SEVERE) (HCC): Chronic | ICD-10-CM

## 2019-04-15 NOTE — PROGRESS NOTES
Chief Complaint   Patient presents with    Annual Wellness Visit     Body mass index is 19.73 kg/m². 1. Have you been to the ER, urgent care clinic since your last visit? Hospitalized since your last visit? No    2. Have you seen or consulted any other health care providers outside of the [address] [place] [address] since your last visit? Include any pap smears or colon screening.  No    Reviewed record in preparation for visit and have necessary documentation  Pt did not bring medication to office visit for review  Information was given to pt on Advanced Directives, Living Will  Information was given on Shingles Vaccine  Opportunity was given for questions  Goals that were addressed and/or need to be completed after this appointment include:     Health Maintenance Due   Topic Date Due    Shingrix Vaccine Age 50> (1 of 2) 07/17/1973    Pneumococcal 65+ years (2 of 2 - PPSV23) 12/10/2016    MEDICARE YEARLY EXAM  04/11/2019

## 2019-04-15 NOTE — PROGRESS NOTES
[address] [facility]  [address] [facility], [address] [facility]             Date of visit: 4/15/2019       This is a Subsequent Medicare Annual Wellness Visit (AWV), (Performed more than 12 months after effective date of Medicare Part B enrollment and 12 months after last wellness visit)    I have reviewed the patient's medical history in detail and updated the computerized patient record. Julia Aden is a 80 y.o. female   History obtained from: child and the patient. 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 bilateral cataract removal.     Allergies   Allergen Reactions    Ace Inhibitors Other (comments)     Cough. Also has unclear reaction to ARB's     Current Outpatient Medications   Medication Sig Dispense Refill    felodipine (PLENDIL SR) 5 mg 24 hr tablet Take one tablet by mouth daily.  90 Tab 1    levothyroxine (SYNTHROID) 25 mcg tablet TAKE ONE TABLET BY MOUTH ONCE DAILY BEFORE BREAKFAST 90 Tab 1    losartan (COZAAR) 100 mg tablet Take 1 Tab by mouth daily. 90 Tab 1    furosemide (LASIX) 20 mg tablet TAKE 2 TABLETS BY MOUTH ONCE DAILY 60 Tab 2    traMADol (ULTRAM) 50 mg tablet Take 1 Tab by mouth every six (6) hours as needed for Pain. Max Daily Amount: 200 mg. 60 Tab 2    hydrALAZINE (APRESOLINE) 50 mg tablet TAKE ONE TABLET BY MOUTH 4 TIMES DAILY FOR  HYPERTENSION 120 Tab 5    simvastatin (ZOCOR) 40 mg tablet TAKE 1 TABLET BY MOUTH EACH NIGHT FOR MIXED HYPERLIPIDEMIA 90 Tab 2    omega-3 fatty acids-vitamin e (FISH OIL) 1,000 mg Cap Take 1 Cap by mouth.  aspirin delayed-release 81 mg tablet Take 81 mg by mouth daily.  cholecalciferol, vitamin d3, (VITAMIN D) 1,000 unit tablet Take 1,000 Units by mouth daily.  cyanocobalamin (VITAMIN B-12) 1,000 mcg tablet Take 1,000 mcg by mouth daily.  diclofenac (VOLTAREN) 1 % gel Apply  to affected area four (4) times daily. 100 g 0    simvastatin (ZOCOR) 40 mg tablet Take 1 Tab by mouth nightly for 90 days. Indications: mixed hyperlipidemia 90 Tab 2    nitroglycerin (NITROBID) 2 % ointment Apply 0.5 Inches to affected area every four (4) hours. Apply 1/2 inch to the chest when systolic blood pressure is > 170. 60 g 0    albuterol (PROVENTIL HFA, VENTOLIN HFA, PROAIR HFA) 90 mcg/actuation inhaler Take 2 Puffs by inhalation every six (6) hours as needed for Wheezing (use her insurance's generic). 1 Inhaler 5    fexofenadine (ALLEGRA) 180 mg tablet Take 1 Tab by mouth daily. For allergies. 27 Tab 5     Family History   Problem Relation Age of Onset    Heart Disease Sister      Social History     Tobacco Use    Smoking status: Never Smoker    Smokeless tobacco: Never Used   Substance Use Topics    Alcohol use: No       Specialists/Care Team   Patricia Kahn has established care with the following healthcare providers:  Patient Care Team:  Kimberly Tillman MD as PCP - [place]      Demographics   female  80 y.o.     General Health Questions   -During the past 4 weeks:   -how would you rate your health in general? Good   -how often have you been bothered by feeling dizzy when standing up? occasionally   -how much have you been bothered by bodily pain? not   -Have you noticed any hearing difficulties? no   -has your physical and emotional health limited your social activities with family or friends? no    Emotional Health Questions   -Do you have a history of depression, anxiety, or emotional problems? no  -Over the past 2 weeks, have you felt down, depressed or hopeless? no  -Over the past 2 weeks, have you felt little interest or pleasure in doing things? no    Health Habits   Please describe your diet habits: Self grade B-  Do you get 5 servings of fruits or vegetables daily? yes  Do you exercise regularly? yes    Alcohol Risk Factor Screening: On any occasion during the past 3 months, have you had more than 4 drinks containing alcohol? No   Do you average more than 14 drinks per week? No     Activities of Daily Living and Functional Status   -Do you need help with eating, walking, dressing, bathing, toileting, the phone, transportation, shopping, preparing meals, housework, laundry, medications or managing money? no  -In the past four weeks, was someone available to help you if you needed and wanted help with anything? yes  -Are you confident are you that you can control and manage most of your health problems? yes  -Have you been given information to help you keep track of your medications? yes  -How often do you have trouble taking your medications as prescribed? never    Fall Risk and Home Safety   Have you fallen 2 or more times in the past year? no  Does your home have rugs in the hallway, lack grab bars in the bathroom, lack handrails on the stairs or have poor lighting? no  Do you have smoke detectors and check them regularly?  yes  Do you have difficulties driving a car? no  Do you always fasten your seat belt when you are in a car? yes    Review of Systems (if indicated for problems addressed today)   Review of Systems   Constitutional: Negative. Negative for chills, fever, malaise/fatigue and weight loss. HENT: Positive for hearing loss. Eyes: Negative. Negative for blurred vision and double vision. Respiratory: Negative. Negative for cough, hemoptysis, sputum production and shortness of breath. Cardiovascular: Negative. Negative for chest pain, palpitations and orthopnea. Gastrointestinal: Negative. Negative for abdominal pain, blood in stool, heartburn, nausea and vomiting. Genitourinary: Negative. Negative for dysuria, frequency and urgency. Musculoskeletal: Negative. Negative for back pain, falls, joint pain, myalgias and neck pain. Skin: Negative. Negative for rash. Neurological: Negative. Negative for dizziness, tingling, tremors, weakness and headaches. Endo/Heme/Allergies: Negative. Psychiatric/Behavioral: Negative. Negative for depression. Physical Examination     Wt Readings from Last 3 Encounters:   04/15/19 111 lb 6.4 oz (50.5 kg)   04/11/19 113 lb (51.3 kg)   03/12/19 110 lb 9.6 oz (50.2 kg)     Temp Readings from Last 3 Encounters:   04/15/19 97.5 °F (36.4 °C) (Oral)   04/11/19 98 °F (36.7 °C) (Oral)   03/12/19 97.9 °F (36.6 °C) (Oral)     BP Readings from Last 3 Encounters:   04/15/19 169/65   04/11/19 182/56   03/12/19 156/66     Pulse Readings from Last 3 Encounters:   04/15/19 (!) 58   04/11/19 (!) 55   03/12/19 77       Body mass index is 19.73 kg/m². No exam data present  Was the patient's timed Up & Go test unsteady or longer than 10 seconds? no    Evaluation of Cognitive Function   Mood/affect:  happy  Orientation: Person, Place, Time and Situation  Appearance: age appropriate and casually dressed  Family member/caregiver input: daughter. Additional exam if indicated for problems addressed today:  Physical Exam   Constitutional: She is oriented to person, place, and time.  She appears well-developed and well-nourished. No distress. HENT:   Head: Normocephalic and atraumatic. Mouth/Throat: Oropharynx is clear and moist.   Eyes: Conjunctivae are normal. No scleral icterus. Neck: No thyromegaly present. No carotid bruit. Cardiovascular: Normal rate, regular rhythm and normal heart sounds. Exam reveals no gallop and no friction rub. No murmur heard. Pulmonary/Chest: Effort normal and breath sounds normal. No respiratory distress. She has no wheezes. She has no rales. Abdominal: Soft. Bowel sounds are normal. She exhibits no distension. Musculoskeletal: She exhibits no edema. Lymphadenopathy:     She has no cervical adenopathy. Neurological: She is alert and oriented to person, place, and time. Skin: Skin is warm and dry. No rash noted. She is not diaphoretic. Psychiatric: She has a normal mood and affect. Her behavior is normal. Thought content normal.   Nursing note and vitals reviewed. Advice/Referrals/Counseling (as indicated)     Advance Care Planning (ACP) Provider Note -ON FILE          Preventive Services     (Preventive care checklist to be included in patient instructions)  Discussed today Done Previously Not Needed     x  Pneumococcal vaccines    x  Flu vaccine     x Hepatitis B vaccine (if at risk)   x   Shingles vaccine    x  TDAP vaccine    x  Mammogram    x  Colorectal cancer screening     x Low-dose CT for lung cancer screening     x Bone density test     x Glaucoma screening    x  Cholesterol test    x  Diabetes screening test     x  Diabetes self-management class    x  Nutritionist referral for diabetes or renal disease     Discussion of Advance Directive   Discussed with Patricia Kahn her ability to prepare and advance directive in the case that an injury or illness causes her to be unable to make health care decisions. Assessment/Plan   Z00.00    Diagnoses and all orders for this visit:    1. Medicare annual wellness visit, subsequent    2. CKD (chronic kidney disease) stage 4, GFR 15-29 ml/min (HCC)    3. Chronic renal impairment, stage 4 (severe) (HCC)    4. Hypothyroidism due to acquired atrophy of thyroid        No orders of the defined types were placed in this encounter. Patient Care Team:  Jac Urbina MD as PCP - General    Follow-up and Dispositions    · Return in about 2 months (around 6/15/2019).          Future Appointments   Date Time Provider Tiago De Oliveira   5/9/2019 10:40 AM MD Cecilia Hernandez MD

## 2019-04-15 NOTE — PROGRESS NOTES
Addendum: Her average blood pressure from home is 132/56. She obviously has an element of whitecoat syndrome. She is optimally treated. She has not seen the nephrologist yet.

## 2019-04-15 NOTE — PATIENT INSTRUCTIONS
Billy Simpson with [facility]  [address] [place] [address], [facility] [address] [facility], [address] [place] [address]  [phone]    Monitor blood pressure outside the office several times weekly at different times during the day and evening. Bring the record to me in 3 weeks for review.     Blood Pressure Record     Patient Name:  ______________________ :  ______________________    Date/Time BP Reading Pulse

## 2019-04-16 LAB
ALBUMIN SERPL-MCNC: 4.2 G/DL (ref 3.2–4.6)
BUN SERPL-MCNC: 53 MG/DL (ref 10–36)
BUN/CREAT SERPL: 16 (ref 12–28)
CALCIUM SERPL-MCNC: 10.2 MG/DL (ref 8.7–10.3)
CHLORIDE SERPL-SCNC: 108 MMOL/L (ref 96–106)
CO2 SERPL-SCNC: 17 MMOL/L (ref 20–29)
CREAT SERPL-MCNC: 3.41 MG/DL (ref 0.57–1)
GLUCOSE SERPL-MCNC: 87 MG/DL (ref 65–99)
INTERPRETATION: NORMAL
PHOSPHATE SERPL-MCNC: 4.3 MG/DL (ref 2.5–4.5)
POTASSIUM SERPL-SCNC: 4.7 MMOL/L (ref 3.5–5.2)
SODIUM SERPL-SCNC: 144 MMOL/L (ref 134–144)

## 2019-04-17 NOTE — PROGRESS NOTES
I saw and evaluated the patient, performing the key elements of the service. I discussed the findings, assessment and plan with the resident and agree with the resident's findings and plan as documented in the resident's note. Pt follows closely with PCP for BP and would prefer any medication changes be made by him. Advised close follow-up.

## 2019-05-09 ENCOUNTER — OFFICE VISIT (OUTPATIENT)
Dept: FAMILY MEDICINE CLINIC | Age: 84
End: 2019-05-09

## 2019-05-09 VITALS
SYSTOLIC BLOOD PRESSURE: 186 MMHG | RESPIRATION RATE: 20 BRPM | DIASTOLIC BLOOD PRESSURE: 76 MMHG | HEART RATE: 59 BPM | HEIGHT: 63 IN | WEIGHT: 111 LBS | OXYGEN SATURATION: 98 % | BODY MASS INDEX: 19.67 KG/M2 | TEMPERATURE: 97.8 F

## 2019-05-09 DIAGNOSIS — I10 MALIGNANT HYPERTENSION: Primary | ICD-10-CM

## 2019-05-09 RX ORDER — LOSARTAN POTASSIUM 50 MG/1
100 TABLET ORAL DAILY
Qty: 90 TAB | Refills: 3 | Status: SHIPPED | OUTPATIENT
Start: 2019-05-09 | End: 2019-07-29

## 2019-05-09 NOTE — LETTER
NOTIFICATION RETURN TO WORK / SCHOOL 
 
5/9/2019 11:49 AM 
 
Ms. Blayne Morales [address] [place] [address] To Whom It May Concern: 
Jarod Potts the daughter of Blayne Morales is currently under the care of Collin Lowe. She will return to work on: 5/09 /2019 If there are questions or concerns please have the patient contact our office.  
 
 
 
Sincerely, 
 
 
Rosalie Loredo MD

## 2019-05-09 NOTE — PROGRESS NOTES
I discussed the findings, assessment and plan in detail with the resident and agree with the resident's findings and plan as documented in the resident's note. On higher dose of Plendil. Still spiking. BP's at home are less than 160 on average. Dr. Kayla Thomas will send a note to Dr. Savanna White. Nilda Carson M.D.

## 2019-05-09 NOTE — PROGRESS NOTES
[id number] [address], [address] [place] [address] Affiliated with [facility] and [facility] Note Subjective:  
Adwoa Worley is a 80 y.o. female presents for evaluation of malignant hypertension CC:\" I am doing just fine \" History provided by patient and patient's daughter HPI: 
 
Labile malignant hypertensive with white coat HTN Pt is now taking [id number] ,Hydralazine 50mg, Plendil 5mg every day,Lasix 20mg. Home blood pressures per log are consistently 150s-160s/80-90s . Current Outpatient Medications on File Prior to Visit Medication Sig Dispense Refill  felodipine (PLENDIL SR) 5 mg 24 hr tablet Take one tablet by mouth daily. 90 Tab 1  
 levothyroxine (SYNTHROID) 25 mcg tablet TAKE ONE TABLET BY MOUTH ONCE DAILY BEFORE BREAKFAST 90 Tab 1  
 furosemide (LASIX) 20 mg tablet TAKE 2 TABLETS BY MOUTH ONCE DAILY 60 Tab 2  
 traMADol (ULTRAM) 50 mg tablet Take 1 Tab by mouth every six (6) hours as needed for Pain. Max Daily Amount: 200 mg. 60 Tab 2  
 hydrALAZINE (APRESOLINE) 50 mg tablet TAKE ONE TABLET BY MOUTH 4 TIMES DAILY FOR  HYPERTENSION 120 Tab 5  
 simvastatin (ZOCOR) 40 mg tablet TAKE 1 TABLET BY MOUTH EACH NIGHT FOR MIXED HYPERLIPIDEMIA 90 Tab 2  
 omega-3 fatty acids-vitamin e (FISH OIL) 1,000 mg Cap Take 1 Cap by mouth.  aspirin delayed-release 81 mg tablet Take 81 mg by mouth daily.  cholecalciferol, vitamin d3, (VITAMIN D) 1,000 unit tablet Take 1,000 Units by mouth daily.  cyanocobalamin (VITAMIN B-12) 1,000 mcg tablet Take 1,000 mcg by mouth daily.  simvastatin (ZOCOR) 40 mg tablet Take 1 Tab by mouth nightly for 90 days. Indications: mixed hyperlipidemia 90 Tab 2  
 nitroglycerin (NITROBID) 2 % ointment Apply 0.5 Inches to affected area every four (4) hours.  Apply 1/2 inch to the chest when systolic blood pressure is > 170. 60 g 0  
 albuterol (PROVENTIL HFA, VENTOLIN HFA, PROAIR HFA) 90 mcg/actuation inhaler Take 2 Puffs by inhalation every six (6) hours as needed for Wheezing (use her insurance's generic). 1 Inhaler 5  
 fexofenadine (ALLEGRA) 180 mg tablet Take 1 Tab by mouth daily. For allergies. 30 Tab 5 No current facility-administered medications on file prior to visit. Past Medical History:  
Diagnosis Date  Arthritis 5/24/2010  
 Hx-TIA (transient ischemic attack) 5/24/2010  Hyperlipidemia 5/24/2010  Hypertension 5/24/2010  Migraine 5/24/2010 Social History Socioeconomic History  Marital status:  Spouse name: Not on file  Number of children: Not on file  Years of education: Not on file  Highest education level: Not on file Occupational History  Not on file Social Needs  Financial resource strain: Not on file  Food insecurity:  
  Worry: Not on file Inability: Not on file  Transportation needs:  
  Medical: Not on file Non-medical: Not on file Tobacco Use  Smoking status: Never Smoker  Smokeless tobacco: Never Used Substance and Sexual Activity  Alcohol use: No  
 Drug use: No  
 Sexual activity: Not on file Lifestyle  Physical activity:  
  Days per week: Not on file Minutes per session: Not on file  Stress: Not on file Relationships  Social connections:  
  Talks on phone: Not on file Gets together: Not on file Attends [employer]ism service: Not on file Active member of club or organization: Not on file Attends meetings of clubs or organizations: Not on file Relationship status: Not on file  Intimate partner violence:  
  Fear of current or ex partner: Not on file Emotionally abused: Not on file Physically abused: Not on file Forced sexual activity: Not on file Other Topics Concern  Not on file Social History Narrative  Not on file Review of Systems Constitutional: Negative for chills and fever. Respiratory: Negative for cough and hemoptysis. Cardiovascular: Negative for chest pain. Gastrointestinal: Negative for abdominal pain, nausea and vomiting. Genitourinary: Negative for dysuria. Musculoskeletal: Negative for myalgias. Skin: Negative for itching and rash. Neurological: Negative for dizziness, tingling, sensory change, focal weakness and headaches. Objective:  
 
Visit Vitals /76 Pulse (!) 59 Temp 97.8 °F (36.6 °C) (Oral) Resp 20 Ht 5' 3\" (1.6 m) Wt 111 lb (50.3 kg) SpO2 98% BMI 19.66 kg/m² Physical Exam: 
Physical Exam  
Constitutional: She is oriented to person, place, and time. She appears well-developed and well-nourished. HENT:  
Head: Normocephalic and atraumatic. Eyes: Pupils are equal, round, and reactive to light. Conjunctivae are normal. Right eye exhibits no discharge. Left eye exhibits no discharge. No scleral icterus. Neck: Normal range of motion. Neck supple. Cardiovascular: Normal rate, regular rhythm, normal heart sounds and intact distal pulses. Exam reveals no gallop and no friction rub. No murmur heard. Pulmonary/Chest: Effort normal and breath sounds normal. No respiratory distress. She has no wheezes. She has no rales. She exhibits no tenderness. Musculoskeletal: She exhibits no edema, tenderness or deformity. Neurological: She is alert and oriented to person, place, and time. Skin: Skin is warm and dry. No rash noted. No erythema. Nursing note and vitals reviewed. Assessment and orders: ICD-10-CM ICD-9-CM 1. Malignant hypertension I10 401.0 losartan (COZAAR) 50 mg tablet Diagnoses and all orders for this visit: 
 
1. Malignant hypertension 
-     losartan (COZAAR) 50 mg tablet; Take 2 Tabs by mouth daily. Will continue all  home medications. Cozaar script changed but dose remained the same. Follow-up and Dispositions · Return in about 5 weeks (around 6/14/2019) for routine follow up. I have reviewed patient medical and social history and medications. I have reviewed pertinent labs results and other data. I have discussed the diagnosis with the patient and the intended plan as seen in the above orders. The patient has received an after-visit summary and questions were answered concerning future plans. I have discussed medication side effects and warnings with the patient as well. Melody Elizalde MD 
Resident ELISABETH HERNADEZ & SHANNAN NEWBERRY [facility] 05/14/19

## 2019-05-09 NOTE — PROGRESS NOTES
Chief Complaint Patient presents with  Blood Pressure Check Body mass index is 19.66 kg/m². 1. Have you been to the ER, urgent care clinic since your last visit? Hospitalized since your last visit? No 
 
2. Have you seen or consulted any other health care providers outside of the [address] [place] [address] since your last visit? Include any pap smears or colon screening. No 
 
Reviewed record in preparation for visit and have necessary documentation Pt did not bring medication to office visit for review Information was given to pt on Advanced Directives, Living Will Information was given on Shingles Vaccine Opportunity was given for questions Goals that were addressed and/or need to be completed after this appointment include:  
 
Health Maintenance Due Topic Date Due  Shingrix Vaccine Age 50> (1 of 2) 07/17/1973  Pneumococcal 65+ years (2 of 2 - PPSV23) 12/10/2016

## 2019-05-28 DIAGNOSIS — E78.2 MIXED HYPERLIPIDEMIA: ICD-10-CM

## 2019-05-29 RX ORDER — SIMVASTATIN 40 MG/1
TABLET, FILM COATED ORAL
Qty: 90 TAB | Refills: 2 | Status: SHIPPED | OUTPATIENT
Start: 2019-05-29 | End: 2019-07-10 | Stop reason: SDUPTHER

## 2019-07-10 DIAGNOSIS — E78.2 MIXED HYPERLIPIDEMIA: ICD-10-CM

## 2019-07-10 DIAGNOSIS — E03.4 HYPOTHYROIDISM DUE TO ACQUIRED ATROPHY OF THYROID: Chronic | ICD-10-CM

## 2019-07-11 RX ORDER — LEVOTHYROXINE SODIUM 25 UG/1
TABLET ORAL
Qty: 90 TAB | Refills: 1 | Status: SHIPPED | OUTPATIENT
Start: 2019-07-11 | End: 2019-01-01 | Stop reason: SDUPTHER

## 2019-07-11 RX ORDER — SIMVASTATIN 40 MG/1
40 TABLET, FILM COATED ORAL
Qty: 90 TAB | Refills: 2 | Status: SHIPPED | OUTPATIENT
Start: 2019-07-11

## 2019-07-15 ENCOUNTER — OFFICE VISIT (OUTPATIENT)
Dept: FAMILY MEDICINE CLINIC | Age: 84
End: 2019-07-15

## 2019-07-15 VITALS
WEIGHT: 109.8 LBS | SYSTOLIC BLOOD PRESSURE: 165 MMHG | HEART RATE: 53 BPM | RESPIRATION RATE: 18 BRPM | HEIGHT: 63 IN | BODY MASS INDEX: 19.45 KG/M2 | TEMPERATURE: 97.7 F | DIASTOLIC BLOOD PRESSURE: 67 MMHG | OXYGEN SATURATION: 100 %

## 2019-07-15 DIAGNOSIS — I13.10 MALIGNANT HTN WITH HEART DISEASE, W/O CHF, WITH CHRONIC KIDNEY DISEASE: Chronic | ICD-10-CM

## 2019-07-15 DIAGNOSIS — Z91.14 HISTORY OF MEDICATION NONCOMPLIANCE: ICD-10-CM

## 2019-07-15 DIAGNOSIS — M19.90 ARTHRITIS: Chronic | ICD-10-CM

## 2019-07-15 DIAGNOSIS — N18.4 CHRONIC RENAL IMPAIRMENT, STAGE 4 (SEVERE) (HCC): Chronic | ICD-10-CM

## 2019-07-15 DIAGNOSIS — E03.4 HYPOTHYROIDISM DUE TO ACQUIRED ATROPHY OF THYROID: Chronic | ICD-10-CM

## 2019-07-15 DIAGNOSIS — E78.00 PURE HYPERCHOLESTEROLEMIA: Primary | Chronic | ICD-10-CM

## 2019-07-15 NOTE — PATIENT INSTRUCTIONS
[address] Affiliated with [address] [place] [address],  [address] Gerardo Appiah, [address] [facility] 
[phone] Monitor blood pressure outside the office several times weekly at different times during the day and evening. Bring the record to me in 3 weeks for review. Blood Pressure Record Patient Name:  ______________________ :  ______________________ Date/Time BP Reading Pulse

## 2019-07-15 NOTE — PROGRESS NOTES
1. Have you been to the ER, urgent care clinic since your last visit? Hospitalized since your last visit? No 
 
2. Have you seen or consulted any other health care providers outside of the [address] [place] [address] since your last visit? Include any pap smears or colon screening. No 
Reviewed record in preparation for visit and have necessary documentation Pt did not bring medication to office visit for review Information was given to pt on Advanced Directives, Living Will Information was given on Shingles Vaccine 
opportunity was given for questions Goals that were addressed and/or need to be completed during or after this appointment include Health Maintenance Due Topic Date Due  Shingrix Vaccine Age 50> (1 of 2) 07/17/1973  Pneumococcal 65+ years (2 of 2 - PPSV23) 12/10/2016

## 2019-07-15 NOTE — PROGRESS NOTES
[address], [address] [place] [address] 
[phone] Progress Note Patient: Richard Hart MRN: [id number]  SSN: [id number] YOB: 1923  Age: 80 y.o. Sex: female Chief Complaint Patient presents with  Medication Evaluation  Follow Up Chronic Condition Subjective:  
 
Encounter Diagnoses Name Primary?  Pure hypercholesterolemia: LDL is at goal. 
Cardiovascular risks for her are: LDL goal is under 100 
hypertension 
hyperlipidemia. Key Antihyperlipidemia Meds   
    
  
 simvastatin (ZOCOR) 40 mg tablet (Taking) Take 1 Tab by mouth nightly. omega-3 fatty acids-vitamin e (FISH OIL) 1,000 mg Cap (Taking) Take 1 Cap by mouth. simvastatin (ZOCOR) 40 mg tablet Take 1 Tab by mouth nightly for 90 days. Indications: mixed hyperlipidemia Lab Results Component Value Date/Time Cholesterol, total 188 02/06/2019 12:35 PM  
 HDL Cholesterol 63 02/06/2019 12:35 PM  
 LDL, calculated 104 (H) 02/06/2019 12:35 PM  
 Triglyceride 104 02/06/2019 12:35 PM  
 CHOL/HDL Ratio 3.3 10/06/2010 10:34 AM  
 
Lab Results Component Value Date/Time ALT (SGPT) 10 02/06/2019 12:35 PM  
 AST (SGOT) 12 02/06/2019 12:35 PM  
 Alk. phosphatase 79 07/11/2018 09:36 AM  
 Bilirubin, total 0.4 07/11/2018 09:36 AM  
 
 Myalgias: No 
 Fatigue: No 
 Other side effects: no Wt Readings from Last 3 Encounters:  
07/15/19 109 lb 12.8 oz (49.8 kg) 05/09/19 111 lb (50.3 kg) 04/15/19 111 lb 6.4 oz (50.5 kg) The patient is aware of our goal to reduce or eliminate the long term problems (such as strokes and heart attacks) related to poorly controlled hyperlipidemia. Yes  Chronic renal impairment, stage 4 (severe) ([facility] [address]): Did not see any medication changes in Dr. Shantal Valencia note. From looking at her pillbox that she obviously has not been taking her medication properly. She does not even have hydralazine in her bag. Nephrologist: Dr. Pat Naylor Lab Results Component Value Date/Time GFR est AA 13 (L) 04/15/2019 04:46 PM  
 GFR est non-AA 11 (L) 04/15/2019 04:46 PM  
 Creatinine 3.41 (H) 04/15/2019 04:46 PM  
 BUN 53 (H) 04/15/2019 04:46 PM  
 Sodium 144 04/15/2019 04:46 PM  
 Potassium 4.7 04/15/2019 04:46 PM  
 Chloride 108 (H) 04/15/2019 04:46 PM  
 CO2 17 (L) 04/15/2019 04:46 PM  
 
Lab Results Component Value Date/Time WBC 5.1 04/13/2017 04:36 PM  
 HGB 10.1 (L) 03/12/2019 03:18 PM  
 HCT 34.1 04/13/2017 04:36 PM  
 PLATELET 331 18/64/8714 04:36 PM  
 MCV 92 04/13/2017 04:36 PM  
 
Lab Results Component Value Date/Time Vitamin D 25-Hydroxy 36.6 10/14/2011 10:50 AM  
 VITAMIN D, 25-HYDROXY 37.8 02/06/2019 12:35 PM  
   
Lab Results Component Value Date/Time Calcium 10.2 04/15/2019 04:46 PM  
 Phosphorus 4.3 04/15/2019 04:46 PM  
 PTH, Intact 156 (H) 02/06/2019 12:35 PM  
 
 
 
   
 Hypothyroidism due to acquired atrophy of thyroid: 
Lab Results Component Value Date/Time TSH 3.450 06/12/2018 10:10 AM  
 T4, Free 1.22 02/06/2019 12:35 PM  
 
 Denies fatigue, nervousness,weight changes, heat orcold intolerance, bowel changes,skin changes, cardiovascular symptoms, hair loss, feeling excessive energy, tremor, palpitations and weight loss. Thyroid medication has been unchanged since last medication check and labs. 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 
BP Readings from Last 3 Encounters:  
07/15/19 169/74  
05/09/19 186/76  
04/15/19 152/55 The patient reports:  taking medications as instructed, no medication side effects noted, no TIA's, no chest pain on exertion, noting swelling of ankles. Key CAD CHF Meds   
    
  
 simvastatin (ZOCOR) 40 mg tablet (Taking) Take 1 Tab by mouth nightly. losartan (COZAAR) 50 mg tablet (Taking) Take 2 Tabs by mouth daily. felodipine (PLENDIL SR) 5 mg 24 hr tablet (Taking) Take one tablet by mouth daily. furosemide (LASIX) 20 mg tablet (Taking) TAKE 2 TABLETS BY MOUTH ONCE DAILY  
 hydrALAZINE (APRESOLINE) 50 mg tablet (Taking) TAKE ONE TABLET BY MOUTH 4 TIMES DAILY FOR  HYPERTENSION  
 omega-3 fatty acids-vitamin e (FISH OIL) 1,000 mg Cap (Taking) Take 1 Cap by mouth. aspirin delayed-release 81 mg tablet (Taking) Take 81 mg by mouth daily. simvastatin (ZOCOR) 40 mg tablet Take 1 Tab by mouth nightly for 90 days. Indications: mixed hyperlipidemia  
 nitroglycerin (NITROBID) 2 % ointment Apply 0.5 Inches to affected area every four (4) hours. Apply 1/2 inch to the chest when systolic blood pressure is > 170. Lab Results Component Value Date/Time Sodium 144 04/15/2019 04:46 PM  
 Potassium 4.7 04/15/2019 04:46 PM  
 Chloride 108 (H) 04/15/2019 04:46 PM  
 CO2 17 (L) 04/15/2019 04:46 PM  
 Anion gap 10 10/06/2010 10:34 AM  
 Glucose 87 04/15/2019 04:46 PM  
 BUN 53 (H) 04/15/2019 04:46 PM  
 Creatinine 3.41 (H) 04/15/2019 04:46 PM  
 BUN/Creatinine ratio 16 04/15/2019 04:46 PM  
 GFR est AA 13 (L) 04/15/2019 04:46 PM  
 GFR est non-AA 11 (L) 04/15/2019 04:46 PM  
 Calcium 10.2 04/15/2019 04:46 PM  
 Bilirubin, total 0.4 07/11/2018 09:36 AM  
 AST (SGOT) 12 02/06/2019 12:35 PM  
 Alk. phosphatase 79 07/11/2018 09:36 AM  
 Protein, total 7.4 07/11/2018 09:36 AM  
 Albumin 4.2 04/15/2019 04:46 PM  
 Globulin 3.2 10/06/2010 10:34 AM  
 A-G Ratio 1.6 07/11/2018 09:36 AM  
 ALT (SGPT) 10 02/06/2019 12:35 PM  
 
Low salt diet? yes Aerobic exercise? no 
Our goal is to normalize the blood pressure to decrease the risks of strokes and heart attacks. The patient is in agreement with the plan.  Arthritis: Diffuse osteoarthritis. No change. Current and past medical information: 
 
Current Medications after this visit[de-identified]    
Current Outpatient Medications Medication Sig  levothyroxine (SYNTHROID) 25 mcg tablet TAKE ONE TABLET BY MOUTH ONCE DAILY BEFORE BREAKFAST  simvastatin (ZOCOR) 40 mg tablet Take 1 Tab by mouth nightly.  losartan (COZAAR) 50 mg tablet Take 2 Tabs by mouth daily.  felodipine (PLENDIL SR) 5 mg 24 hr tablet Take one tablet by mouth daily.  furosemide (LASIX) 20 mg tablet TAKE 2 TABLETS BY MOUTH ONCE DAILY  traMADol (ULTRAM) 50 mg tablet Take 1 Tab by mouth every six (6) hours as needed for Pain. Max Daily Amount: 200 mg.  hydrALAZINE (APRESOLINE) 50 mg tablet TAKE ONE TABLET BY MOUTH 4 TIMES DAILY FOR  HYPERTENSION  
 omega-3 fatty acids-vitamin e (FISH OIL) 1,000 mg Cap Take 1 Cap by mouth.  aspirin delayed-release 81 mg tablet Take 81 mg by mouth daily.  cholecalciferol, vitamin d3, (VITAMIN D) 1,000 unit tablet Take 1,000 Units by mouth daily.  cyanocobalamin (VITAMIN B-12) 1,000 mcg tablet Take 1,000 mcg by mouth daily.  simvastatin (ZOCOR) 40 mg tablet Take 1 Tab by mouth nightly for 90 days. Indications: mixed hyperlipidemia  nitroglycerin (NITROBID) 2 % ointment Apply 0.5 Inches to affected area every four (4) hours. Apply 1/2 inch to the chest when systolic blood pressure is > 170.  albuterol (PROVENTIL HFA, VENTOLIN HFA, PROAIR HFA) 90 mcg/actuation inhaler Take 2 Puffs by inhalation every six (6) hours as needed for Wheezing (use her insurance's generic).  fexofenadine (ALLEGRA) 180 mg tablet Take 1 Tab by mouth daily. For allergies. No current facility-administered medications for this visit. Patient Active Problem List  
 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  
 Hypothyroidism 08/05/2013 Priority: 1 - One  
 CRI (chronic renal insufficiency) 02/08/2011 Priority: 1 - One  
 Hyperlipidemia 05/24/2010 Priority: 1 - One  Arthritis 05/24/2010 Priority: 1 - One  
 Hx-TIA (transient ischemic attack) 05/24/2010 Priority: 1 - One  Migraine 05/24/2010 Priority: 1 - One  
 Situational stress 06/12/2018 Priority: 6 - Six  S/P hysterectomy 10/10/2014 Priority: 6 - Six  CKD (chronic kidney disease) stage 4, GFR 15-29 ml/min ([facility]) 04/15/2019 Past Medical History:  
Diagnosis Date  Arthritis 5/24/2010  
 Hx-TIA (transient ischemic attack) 5/24/2010  Hyperlipidemia 5/24/2010  Hypertension 5/24/2010  Migraine 5/24/2010 Allergies Allergen Reactions  Ace Inhibitors Other (comments) Cough. Also has unclear reaction to ARB's Past Surgical History:  
Procedure Laterality Date  HX GYN    
 hysterectomy,btl  HX GYN  9/6/11  
 complete hysterectomy  HX HEENT  10/10. bilateral cataract removal.  
 
 
Social History Socioeconomic History  Marital status:  Spouse name: Not on file  Number of children: Not on file  Years of education: Not on file  Highest education level: Not on file Tobacco Use  Smoking status: Never Smoker  Smokeless tobacco: Never Used Substance and Sexual Activity  Alcohol use: No  
 Drug use: No  
 
 
Review of Systems Constitutional: Negative. Negative for chills, fever, malaise/fatigue and weight loss. HENT: Negative. Negative for hearing loss. Eyes: Negative. Negative for blurred vision and double vision. Respiratory: Negative. Negative for cough, sputum production and shortness of breath. Cardiovascular: Positive for leg swelling. Negative for chest pain and palpitations. Gastrointestinal: Negative. Negative for abdominal pain, blood in stool, heartburn, nausea and vomiting. Genitourinary: Negative. Negative for dysuria, frequency and urgency. Musculoskeletal: Negative. Negative for back pain, falls, myalgias and neck pain. Skin: Negative. Negative for rash. Neurological: Negative. Negative for dizziness, tingling, tremors, weakness and headaches. Endo/Heme/Allergies: Negative. Psychiatric/Behavioral: Negative. Negative for depression. Objective:  
 
Vitals:  
 07/15/19 1548 BP: 169/74 Pulse: (!) 53 Resp: 18 Temp: 97.7 °F (36.5 °C) TempSrc: Oral  
SpO2: 100% Weight: 109 lb 12.8 oz (49.8 kg) Height: 5' 3\" (1.6 m) Body mass index is 19.45 kg/m². Physical Exam  
Constitutional: She is oriented to person, place, and time and well-developed, well-nourished, and in no distress. No distress. HENT:  
Head: Normocephalic and atraumatic. Mouth/Throat: Oropharynx is clear and moist.  
Eyes: Conjunctivae are normal.  
Neck: No thyromegaly present. No carotid bruit. Cardiovascular: Normal rate, regular rhythm and normal heart sounds. No murmur heard. Pulmonary/Chest: Effort normal and breath sounds normal. No respiratory distress. Abdominal: Soft. She exhibits no distension and no mass. There is no tenderness. There is no rebound and no guarding. Musculoskeletal: She exhibits edema. She exhibits no tenderness. 3+ edema to mid shin. Neurological: She is alert and oriented to person, place, and time. Skin: Skin is warm. No rash noted. She is not diaphoretic. No erythema. No pallor. Psychiatric: Mood and affect normal.  
Nursing note and vitals reviewed. Health Maintenance Due Topic Date Due  Shingrix Vaccine Age 50> (1 of 2) 07/17/1973  Pneumococcal 65+ years (2 of 2 - PPSV23) 12/10/2016 Assessment and orders:  
 
Encounter Diagnoses ICD-10-CM ICD-9-CM 1. Pure hypercholesterolemia E78.00 272.0 2. Chronic renal impairment, stage 4 (severe) (HCC) N18.4 585.4 3. Hypothyroidism due to acquired atrophy of thyroid E03.4 244.8  
  246.8 4. 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  
5. Arthritis M19.90 716.90 Diagnoses and all orders for this visit: 1. Pure hypercholesterolemia 2. Chronic renal impairment, stage 4 (severe) (HCC) 3. Hypothyroidism due to acquired atrophy of thyroid 4. Malignant HTN with heart disease, w/o CHF, with chronic kidney disease 5. Arthritis Plan of care: 
Discussed diagnoses in detail with patient. Medication risks/benefits/side effects discussed with patient. All of the patient's questions were addressed. The patient understands and agrees with our plan of care. The patient knows to call back if they are unsure of or forget any changes we discussed today or if the symptoms change. The patient received an After-Visit Summary which contains VS, orders, medication list and allergy list. This can be used as a \"mini-medical record\" should they have to seek medical care while out of town. Patient Care Team: 
Ana M Vega MD as PCP - Mark Gutierrez MD (Nephrology) No future appointments. Signed By: Raymond Barrow MD   
 July 15, 2019 ATTENTION:  
This medical record was transcribed using an electronic medical records/speech recognition system. Although proofread, it may and can contain electronic, spelling and other errors. Corrections may be executed at a later time. Please feel free to contact me for any clarifications as needed.

## 2019-07-16 DIAGNOSIS — I13.10 MALIGNANT HTN WITH HEART DISEASE, W/O CHF, WITH CHRONIC KIDNEY DISEASE: ICD-10-CM

## 2019-07-16 RX ORDER — HYDRALAZINE HYDROCHLORIDE 50 MG/1
TABLET, FILM COATED ORAL
Qty: 120 TAB | Refills: 5 | Status: SHIPPED | OUTPATIENT
Start: 2019-07-16 | End: 2020-01-01

## 2019-07-16 NOTE — TELEPHONE ENCOUNTER
Patients daughter was told to let us know if patient has Hydralazine at home. Kathy Pedersen states patient does not have this medication therefore likely has not been taking it for unsure length of time.

## 2019-07-29 ENCOUNTER — OFFICE VISIT (OUTPATIENT)
Dept: FAMILY MEDICINE CLINIC | Age: 84
End: 2019-07-29

## 2019-07-29 VITALS
HEIGHT: 63 IN | TEMPERATURE: 97.4 F | RESPIRATION RATE: 20 BRPM | WEIGHT: 105 LBS | HEART RATE: 78 BPM | SYSTOLIC BLOOD PRESSURE: 127 MMHG | BODY MASS INDEX: 18.61 KG/M2 | OXYGEN SATURATION: 99 % | DIASTOLIC BLOOD PRESSURE: 65 MMHG

## 2019-07-29 DIAGNOSIS — I95.1 ORTHOSTASIS: Chronic | ICD-10-CM

## 2019-07-29 DIAGNOSIS — E03.4 HYPOTHYROIDISM DUE TO ACQUIRED ATROPHY OF THYROID: Chronic | ICD-10-CM

## 2019-07-29 DIAGNOSIS — N18.4 CHRONIC RENAL IMPAIRMENT, STAGE 4 (SEVERE) (HCC): Chronic | ICD-10-CM

## 2019-07-29 DIAGNOSIS — I10 ESSENTIAL HYPERTENSION: Primary | Chronic | ICD-10-CM

## 2019-07-29 DIAGNOSIS — D63.8 ANEMIA, CHRONIC DISEASE: ICD-10-CM

## 2019-07-29 RX ORDER — LOSARTAN POTASSIUM 100 MG/1
100 TABLET ORAL DAILY
Qty: 90 TAB | Refills: 2 | Status: SHIPPED | OUTPATIENT
Start: 2019-07-29 | End: 2020-01-01

## 2019-07-29 NOTE — PROGRESS NOTES
[address]  [phone]           Progress Note    Patient: Gabriel Brar MRN: [id number]  SSN: [id number]    YOB: 1923  Age: 80 y.o. Sex: female        Chief Complaint   Patient presents with    Blood Pressure Check         Subjective:     Encounter Diagnoses   Name Primary?  Essential hypertension: She still has a 20 point drop when she stands. Blood pressure average from home is 393 systolic. Normal diastolic. Reviewed Dr. Navjot Lua detailed note. BP Readings from Last 3 Encounters:   07/29/19 127/65   07/15/19 165/67   05/09/19 186/76     The patient reports:  taking medications as instructed, no medication side effects noted, no TIA's, no chest pain on exertion, no dyspnea on exertion, no swelling of ankles. Key CAD CHF Meds             losartan (COZAAR) 100 mg tablet (Taking) Take 1 Tab by mouth daily. hydrALAZINE (APRESOLINE) 50 mg tablet (Taking) TAKE ONE TABLET BY MOUTH 4 TIMES DAILY FOR  HYPERTENSION    simvastatin (ZOCOR) 40 mg tablet (Taking) Take 1 Tab by mouth nightly. felodipine (PLENDIL SR) 5 mg 24 hr tablet (Taking) Take one tablet by mouth daily. furosemide (LASIX) 20 mg tablet (Taking) TAKE 2 TABLETS BY MOUTH ONCE DAILY    omega-3 fatty acids-vitamin e (FISH OIL) 1,000 mg Cap (Taking) Take 1 Cap by mouth. aspirin delayed-release 81 mg tablet (Taking) Take 81 mg by mouth daily. simvastatin (ZOCOR) 40 mg tablet Take 1 Tab by mouth nightly for 90 days. Indications: mixed hyperlipidemia    nitroglycerin (NITROBID) 2 % ointment Apply 0.5 Inches to affected area every four (4) hours. Apply 1/2 inch to the chest when systolic blood pressure is > 170.            Lab Results   Component Value Date/Time    Sodium 144 04/15/2019 04:46 PM    Potassium 4.7 04/15/2019 04:46 PM    Chloride 108 (H) 04/15/2019 04:46 PM    CO2 17 (L) 04/15/2019 04:46 PM    Anion gap 10 10/06/2010 10:34 AM Glucose 87 04/15/2019 04:46 PM    BUN 53 (H) 04/15/2019 04:46 PM    Creatinine 3.41 (H) 04/15/2019 04:46 PM    BUN/Creatinine ratio 16 04/15/2019 04:46 PM    GFR est AA 13 (L) 04/15/2019 04:46 PM    GFR est non-AA 11 (L) 04/15/2019 04:46 PM    Calcium 10.2 04/15/2019 04:46 PM    Bilirubin, total 0.4 07/11/2018 09:36 AM    AST (SGOT) 12 02/06/2019 12:35 PM    Alk. phosphatase 79 07/11/2018 09:36 AM    Protein, total 7.4 07/11/2018 09:36 AM    Albumin 4.2 04/15/2019 04:46 PM    Globulin 3.2 10/06/2010 10:34 AM    A-G Ratio 1.6 07/11/2018 09:36 AM    ALT (SGPT) 10 02/06/2019 12:35 PM     Low salt diet? yes  Aerobic exercise? no  Our goal is to normalize the blood pressure to decrease the risks of strokes and heart attacks. The patient is in agreement with the plan. Yes    Chronic renal impairment, stage 4 (severe) (HCC)     Hypothyroidism due to acquired atrophy of thyroid     Anemia, chronic disease     Orthostasis              Current and past medical information:    Current Medications after this visit[de-identified]     Current Outpatient Medications   Medication Sig    losartan (COZAAR) 100 mg tablet Take 1 Tab by mouth daily.  hydrALAZINE (APRESOLINE) 50 mg tablet TAKE ONE TABLET BY MOUTH 4 TIMES DAILY FOR  HYPERTENSION    levothyroxine (SYNTHROID) 25 mcg tablet TAKE ONE TABLET BY MOUTH ONCE DAILY BEFORE BREAKFAST    simvastatin (ZOCOR) 40 mg tablet Take 1 Tab by mouth nightly.  felodipine (PLENDIL SR) 5 mg 24 hr tablet Take one tablet by mouth daily.  furosemide (LASIX) 20 mg tablet TAKE 2 TABLETS BY MOUTH ONCE DAILY    traMADol (ULTRAM) 50 mg tablet Take 1 Tab by mouth every six (6) hours as needed for Pain. Max Daily Amount: 200 mg.    omega-3 fatty acids-vitamin e (FISH OIL) 1,000 mg Cap Take 1 Cap by mouth.  aspirin delayed-release 81 mg tablet Take 81 mg by mouth daily.  cholecalciferol, vitamin d3, (VITAMIN D) 1,000 unit tablet Take 1,000 Units by mouth daily.     cyanocobalamin (VITAMIN B-12) 1,000 mcg tablet Take 1,000 mcg by mouth daily.  simvastatin (ZOCOR) 40 mg tablet Take 1 Tab by mouth nightly for 90 days. Indications: mixed hyperlipidemia    nitroglycerin (NITROBID) 2 % ointment Apply 0.5 Inches to affected area every four (4) hours. Apply 1/2 inch to the chest when systolic blood pressure is > 170.  albuterol (PROVENTIL HFA, VENTOLIN HFA, PROAIR HFA) 90 mcg/actuation inhaler Take 2 Puffs by inhalation every six (6) hours as needed for Wheezing (use her insurance's generic).  fexofenadine (ALLEGRA) 180 mg tablet Take 1 Tab by mouth daily. For allergies. No current facility-administered medications for this visit. Patient Active Problem List    Diagnosis Date Noted    Orthostasis 07/29/2019     Priority: 1 - One    CKD (chronic kidney disease) stage 4, GFR 15-29 ml/min ([facility]) 04/15/2019     Priority: 1 - One    Anemia, chronic disease 03/12/2019     Priority: 1 - One    Malignant HTN with heart disease, w/o CHF, with chronic kidney disease 05/26/2017     Priority: 1 - One    Hypothyroidism 08/05/2013     Priority: 1 - One    CRI (chronic renal insufficiency) 02/08/2011     Priority: 1 - One    Hyperlipidemia 05/24/2010     Priority: 1 - One    Arthritis 05/24/2010     Priority: 1 - One    Hx-TIA (transient ischemic attack) 05/24/2010     Priority: 1 - One    Migraine 05/24/2010     Priority: 1 - One    Situational stress 06/12/2018     Priority: 6 - Six    S/P hysterectomy 10/10/2014     Priority: 6 - Six       Past Medical History:   Diagnosis Date    Arthritis 5/24/2010    Hx-TIA (transient ischemic attack) 5/24/2010    Hyperlipidemia 5/24/2010    Hypertension 5/24/2010    Migraine 5/24/2010       Allergies   Allergen Reactions    Ace Inhibitors Other (comments)     Cough.  Also has unclear reaction to ARB's       Past Surgical History:   Procedure Laterality Date    HX GYN      hysterectomy,btl    HX GYN  9/6/11    complete hysterectomy    HX HEENT 10/10.    bilateral cataract removal.       Social History     Socioeconomic History    Marital status:      Spouse name: Not on file    Number of children: Not on file    Years of education: Not on file    Highest education level: Not on file   Tobacco Use    Smoking status: Never Smoker    Smokeless tobacco: Never Used   Substance and Sexual Activity    Alcohol use: No    Drug use: No       Review of Systems   Constitutional: Negative. Negative for chills, fever, malaise/fatigue and weight loss. HENT: Negative. Negative for hearing loss. Eyes: Negative. Negative for blurred vision and double vision. Respiratory: Negative. Negative for cough, sputum production and shortness of breath. Cardiovascular: Negative. Negative for chest pain and palpitations. Gastrointestinal: Negative. Negative for abdominal pain, blood in stool, heartburn, nausea and vomiting. Genitourinary: Negative. Negative for dysuria, frequency and urgency. Musculoskeletal: Negative. Negative for back pain, falls, myalgias and neck pain. Skin: Negative. Negative for rash. Neurological: Negative. Negative for dizziness, tingling, tremors, weakness and headaches. Endo/Heme/Allergies: Negative. Psychiatric/Behavioral: Negative. Negative for depression. Objective:     Vitals:    07/29/19 1134 07/29/19 1204   BP: 157/66 127/65   Pulse: 78    Resp: 20    Temp: 97.4 °F (36.3 °C)    TempSrc: Oral    SpO2: 99%    Weight: 105 lb (47.6 kg)    Height: 5' 3\" (1.6 m)       Body mass index is 18.6 kg/m². Physical Exam   Constitutional: She is oriented to person, place, and time and well-developed, well-nourished, and in no distress. No distress. HENT:   Head: Normocephalic and atraumatic. Mouth/Throat: Oropharynx is clear and moist.   Eyes: Conjunctivae are normal.   Neck: No thyromegaly present. No carotid bruit. Cardiovascular: Normal rate and regular rhythm. Murmur heard.   Pulmonary/Chest: Effort normal and breath sounds normal. No respiratory distress. Abdominal: Soft. She exhibits no distension. Neurological: She is alert and oriented to person, place, and time. Skin: Skin is warm. No rash noted. She is not diaphoretic. No erythema. Psychiatric: Mood and affect normal.   Nursing note and vitals reviewed. There are no preventive care reminders to display for this patient. Assessment and orders:     Encounter Diagnoses     ICD-10-CM ICD-9-CM   1. Essential hypertension I10 401.9   2. Chronic renal impairment, stage 4 (severe) ([facility]) N18.4 585.4   3. Hypothyroidism due to acquired atrophy of thyroid E03.4 244.8     246.8   4. Anemia, chronic disease D63.8 285.29   5. Orthostasis I95.1 458.0     Diagnoses and all orders for this visit:    1. Essential hypertension-finally controlled with 's help. -     losartan (COZAAR) 100 mg tablet; Take 1 Tab by mouth daily. 2. Chronic renal impairment, stage 4 (severe) ([facility])-she sees Dr. Bethany Gregory again on August 14. She will need labs prior to then. Her daughter will check to see if she has labs from Dr. Bethany Gregory at home -if not I will put them in the system. 3. Hypothyroidism due to acquired atrophy of thyroid -no signs of hypothyroidism    4. Anemia, chronic disease   Lab Results   Component Value Date/Time    HGB 10.1 (L) 03/12/2019 03:18 PM       5. Orthostasis-20 point drop today. Complicates blood pressure control. No symptoms            Plan of care:  Discussed diagnoses in detail with patient. Medication risks/benefits/side effects discussed with patient. All of the patient's questions were addressed. The patient understands and agrees with our plan of care. The patient knows to call back if they are unsure of or forget any changes we discussed today or if the symptoms change.      The patient received an After-Visit Summary which contains VS, orders, medication list and allergy list. This can be used as a \"mini-medical record\" should they have to seek medical care while out of town. Patient Care Team:  Manuel Rivera MD as PCP - General  Tori Butterfield MD (Nephrology)    Follow-up and Dispositions    · Return in about 3 months (around 10/29/2019). Future Appointments   Date Time Provider Tiago De Oliveira   10/30/2019 11:10 AM Manuel Rivera MD [facility] SCHED       Signed By: Anders Mac MD     July 29, 2019      ATTENTION:   This medical record was transcribed using an electronic medical records/speech recognition system. Although proofread, it may and can contain electronic, spelling and other errors. Corrections may be executed at a later time. Please feel free to contact me for any clarifications as needed.

## 2019-07-29 NOTE — PATIENT INSTRUCTIONS
Billy Simpson with [facility]  [address] [place] [address],  [address] [facility], [address] [place] [address]  [phone]    Monitor blood pressure outside the office several times weekly at different times during the day and evening. Bring the record to me in 3 weeks for review.     Blood Pressure Record     Patient Name:  ______________________ :  ______________________    Date/Time BP Reading Pulse

## 2019-07-29 NOTE — PROGRESS NOTES
1. Have you been to the ER, urgent care clinic since your last visit? Hospitalized since your last visit? No    2. Have you seen or consulted any other health care providers outside of the [address] [place] [address] since your last visit? Include any pap smears or colon screening.  No  Reviewed record in preparation for visit and have necessary documentation  Pt did not bring medication to office visit for review  Information was given to pt on Advanced Directives, Living Will  Information was given on Shingles Vaccine  opportunity was given for questions  Goals that were addressed and/or need to be completed during or after this appointment include     Health Maintenance Due   Topic Date Due    Shingrix Vaccine Age 49> (1 of 2) 07/17/1973    Pneumococcal 65+ years (2 of 2 - PPSV23) 12/10/2016

## 2019-08-13 ENCOUNTER — TELEPHONE (OUTPATIENT)
Dept: FAMILY MEDICINE CLINIC | Age: 84
End: 2019-08-13

## 2019-08-16 ENCOUNTER — OFFICE VISIT (OUTPATIENT)
Dept: FAMILY MEDICINE CLINIC | Age: 84
End: 2019-08-16

## 2019-08-16 VITALS
RESPIRATION RATE: 20 BRPM | DIASTOLIC BLOOD PRESSURE: 62 MMHG | HEIGHT: 63 IN | TEMPERATURE: 98.8 F | SYSTOLIC BLOOD PRESSURE: 150 MMHG | HEART RATE: 73 BPM | WEIGHT: 109.2 LBS | OXYGEN SATURATION: 100 % | BODY MASS INDEX: 19.35 KG/M2

## 2019-08-16 DIAGNOSIS — N18.4 CKD (CHRONIC KIDNEY DISEASE) STAGE 4, GFR 15-29 ML/MIN (HCC): ICD-10-CM

## 2019-08-16 DIAGNOSIS — I13.10 MALIGNANT HTN WITH HEART DISEASE, W/O CHF, WITH CHRONIC KIDNEY DISEASE: ICD-10-CM

## 2019-08-16 DIAGNOSIS — D63.8 ANEMIA, CHRONIC DISEASE: ICD-10-CM

## 2019-08-16 DIAGNOSIS — F41.1 GAD (GENERALIZED ANXIETY DISORDER): Primary | ICD-10-CM

## 2019-08-16 DIAGNOSIS — F43.9 SITUATIONAL STRESS: ICD-10-CM

## 2019-08-16 RX ORDER — CARVEDILOL 6.25 MG/1
TABLET ORAL
Refills: 2 | COMMUNITY
Start: 2019-08-14

## 2019-08-16 RX ORDER — ESCITALOPRAM OXALATE 5 MG/1
2.5 TABLET ORAL DAILY
Qty: 15 TAB | Refills: 1 | Status: SHIPPED | OUTPATIENT
Start: 2019-08-16

## 2019-08-16 NOTE — PATIENT INSTRUCTIONS
Learning About Generalized Anxiety Disorder  What is generalized anxiety disorder? We all worry. It's a normal part of life. But when you have generalized anxiety disorder, you worry about lots of things and have a hard time stopping your worry. This worry or anxiety interferes with your relationships, work, and life. What causes it? The cause is not known. But it may be passed down through families. What are the symptoms? You may feel anxious or worry most days about things like work, relationships, health, or money. You may worry about things that are unlikely to happen. You find it hard to stop or control the worry. Because you worry a lot and try hard to stop worrying, you may feel restless, tired, tense, or cranky. You may also find it hard to think or sleep. And you may have headaches or an upset stomach. How is it diagnosed? Your doctor will ask about your health and how often you worry or feel anxious. He or she may ask about other symptoms, like whether you:  · Feel restless. · Feel tired. · Have a hard time thinking or feel that your mind goes blank. · Feel cranky. · Have tense muscles. · Have sleep problems. A physical exam and tests can help make sure that your symptoms aren't caused by a different condition, such as a thyroid problem. How is it treated? Counseling and medicine can both work to treat anxiety. The two are often used along with lifestyle changes. Cognitive-behavioral therapy (CBT) is a type of counseling that's used to help treat anxiety. In CBT, you learn how to notice and replace thoughts that make you feel worried. It also can help you learn how to relax when you worry. Medicines can help. These medicines are often also used for depression. Selective serotonin reuptake inhibitors (SSRIs) are often tried first. But there are other medicines that your doctor may use. You may need to try a few medicines to find one that works well.   Many people feel better by getting regular exercise, eating healthy meals, and getting good sleep. Mindfulness--focusing on things that happen in the present moment--also can help reduce your anxiety. What can you expect when you have it? Having anxiety can be upsetting. Some people might feel less worried and stressed after a couple of months of treatment. But for other people, it might take longer to feel better. Reaching out to people for help is important. Try not to isolate yourself. Let your family and friends help you. Find someone you can trust and confide in. Talk to that person. When you know what anxiety is--and how you can get help for it--you can start to learn new ways of thinking. This can help you cope and work through your anxiety. Follow-up care is a key part of your treatment and safety. Be sure to make and go to all appointments, and call your doctor if you are having problems. It's also a good idea to know your test results and keep a list of the medicines you take. Where can you learn more? Go to http://rafi-alejandro.info/. Enter G110 in the search box to learn more about \"Learning About Generalized Anxiety Disorder. \"  Current as of: September 11, 2018  Content Version: 12.1  © 1839-0501 [employer]. Care instructions adapted under license by [employer] (which disclaims liability or warranty for this information). If you have questions about a medical condition or this instruction, always ask your healthcare professional. [address] any warranty or liability for your use of this information.

## 2019-08-16 NOTE — PROGRESS NOTES
1. Have you been to the ER, urgent care clinic since your last visit? Hospitalized since your last visit? No    2. Have you seen or consulted any other health care providers outside of the [address] [place] [address] since your last visit? Include any pap smears or colon screening.  No  Reviewed record in preparation for visit and have necessary documentation  Pt did not bring medication to office visit for review  Information was given to pt on Advanced Directives, Living Will  Information was given on Shingles Vaccine  opportunity was given for questions  Goals that were addressed and/or need to be completed during or after this appointment include     Health Maintenance Due   Topic Date Due    Influenza Age 5 to Adult  08/01/2019

## 2019-08-16 NOTE — PROGRESS NOTES
[address] [place] [address]  [phone]           Progress Note    Patient: Derek Lee MRN: [id number]  SSN: [id number]    YOB: 1923  Age: 80 y.o. Sex: female        Chief Complaint   Patient presents with    Anxiety         Subjective:     Encounter Diagnoses   Name Primary?  ONEL (generalized anxiety disorder): she has started worrying about everything since she has been told she has stage 4 CKD. No depression just worries. Any med at her age carries risk of SE. Will have to start low and go slow, no benzodiazepines. Yes    Situational stress: son has had health problems. Reuben De Souza takes care of her.  CKD (chronic kidney disease) stage 4, GFR 15-29 ml/min ([address]):  Nephrologist: Dr. Fanny Tom  Lab Results   Component Value Date/Time    GFR est AA 13 (L) 04/15/2019 04:46 PM    GFR est non-AA 11 (L) 04/15/2019 04:46 PM    Creatinine 3.41 (H) 04/15/2019 04:46 PM    BUN 53 (H) 04/15/2019 04:46 PM    Sodium 144 04/15/2019 04:46 PM    Potassium 4.7 04/15/2019 04:46 PM    Chloride 108 (H) 04/15/2019 04:46 PM    CO2 17 (L) 04/15/2019 04:46 PM     Lab Results   Component Value Date/Time    WBC 5.1 04/13/2017 04:36 PM    HGB 10.1 (L) 03/12/2019 03:18 PM    HCT 34.1 04/13/2017 04:36 PM    PLATELET 034 06/23/8765 04:36 PM    MCV 92 04/13/2017 04:36 PM     Lab Results   Component Value Date/Time    Vitamin D 25-Hydroxy 36.6 10/14/2011 10:50 AM    VITAMIN D, 25-HYDROXY 37.8 02/06/2019 12:35 PM       Lab Results   Component Value Date/Time    Calcium 10.2 04/15/2019 04:46 PM    Phosphorus 4.3 04/15/2019 04:46 PM    PTH, Intact 156 (H) 02/06/2019 12:35 PM              Malignant HTN with heart disease, 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 Fanny Tom added coreg 6.25 mg bid. They wanted to clear this with me.     BP Readings from Last 3 Encounters:   08/16/19 150/62   07/29/19 127/65   07/15/19 165/67     The patient reports:  taking medications as instructed, no medication side effects noted, no TIA's, no chest pain on exertion, no dyspnea on exertion, no swelling of ankles. Key CAD CHF Meds             carvedilol (COREG) 6.25 mg tablet (Taking) TAKE 1 TABLET BY MOUTH TWICE DAILY    losartan (COZAAR) 100 mg tablet (Taking) Take 1 Tab by mouth daily. hydrALAZINE (APRESOLINE) 50 mg tablet (Taking) TAKE ONE TABLET BY MOUTH 4 TIMES DAILY FOR  HYPERTENSION    simvastatin (ZOCOR) 40 mg tablet (Taking) Take 1 Tab by mouth nightly. felodipine (PLENDIL SR) 5 mg 24 hr tablet (Taking) Take one tablet by mouth daily. furosemide (LASIX) 20 mg tablet (Taking) TAKE 2 TABLETS BY MOUTH ONCE DAILY    omega-3 fatty acids-vitamin e (FISH OIL) 1,000 mg Cap (Taking) Take 1 Cap by mouth. aspirin delayed-release 81 mg tablet (Taking) Take 81 mg by mouth daily. simvastatin (ZOCOR) 40 mg tablet Take 1 Tab by mouth nightly for 90 days. Indications: mixed hyperlipidemia    nitroglycerin (NITROBID) 2 % ointment Apply 0.5 Inches to affected area every four (4) hours. Apply 1/2 inch to the chest when systolic blood pressure is > 170. Lab Results   Component Value Date/Time    Sodium 144 04/15/2019 04:46 PM    Potassium 4.7 04/15/2019 04:46 PM    Chloride 108 (H) 04/15/2019 04:46 PM    CO2 17 (L) 04/15/2019 04:46 PM    Anion gap 10 10/06/2010 10:34 AM    Glucose 87 04/15/2019 04:46 PM    BUN 53 (H) 04/15/2019 04:46 PM    Creatinine 3.41 (H) 04/15/2019 04:46 PM    BUN/Creatinine ratio 16 04/15/2019 04:46 PM    GFR est AA 13 (L) 04/15/2019 04:46 PM    GFR est non-AA 11 (L) 04/15/2019 04:46 PM    Calcium 10.2 04/15/2019 04:46 PM    Bilirubin, total 0.4 07/11/2018 09:36 AM    AST (SGOT) 12 02/06/2019 12:35 PM    Alk.  phosphatase 79 07/11/2018 09:36 AM    Protein, total 7.4 07/11/2018 09:36 AM    Albumin 4.2 04/15/2019 04:46 PM    Globulin 3.2 10/06/2010 10:34 AM    A-G Ratio 1.6 07/11/2018 09:36 AM    ALT (SGPT) 10 02/06/2019 12:35 PM     Low salt diet?yes  Aerobic exercise? no  Our goal is to normalize the blood pressure to decrease the risks of strokes and heart attacks. The patient is in agreement with the plan.  Anemia, chronic disease:  No sx. Lab Results   Component Value Date/Time    HGB 10.1 (L) 03/12/2019 03:18 PM     Lab Results   Component Value Date/Time    Iron 58 02/06/2019 12:35 PM    TIBC 258 02/06/2019 12:35 PM    Iron % saturation 22 02/06/2019 12:35 PM    Ferritin 346 (H) 05/15/2012 09:54 AM                   Current and past medical information:    Current Medications after this visit[de-identified]     Current Outpatient Medications   Medication Sig    carvedilol (COREG) 6.25 mg tablet TAKE 1 TABLET BY MOUTH TWICE DAILY    escitalopram oxalate (LEXAPRO) 5 mg tablet Take 0.5 Tabs by mouth daily. Indications: Repeated Episodes of Anxiety    losartan (COZAAR) 100 mg tablet Take 1 Tab by mouth daily.  hydrALAZINE (APRESOLINE) 50 mg tablet TAKE ONE TABLET BY MOUTH 4 TIMES DAILY FOR  HYPERTENSION    levothyroxine (SYNTHROID) 25 mcg tablet TAKE ONE TABLET BY MOUTH ONCE DAILY BEFORE BREAKFAST    simvastatin (ZOCOR) 40 mg tablet Take 1 Tab by mouth nightly.  felodipine (PLENDIL SR) 5 mg 24 hr tablet Take one tablet by mouth daily.  furosemide (LASIX) 20 mg tablet TAKE 2 TABLETS BY MOUTH ONCE DAILY    traMADol (ULTRAM) 50 mg tablet Take 1 Tab by mouth every six (6) hours as needed for Pain. Max Daily Amount: 200 mg.    fexofenadine (ALLEGRA) 180 mg tablet Take 1 Tab by mouth daily. For allergies.  omega-3 fatty acids-vitamin e (FISH OIL) 1,000 mg Cap Take 1 Cap by mouth.  aspirin delayed-release 81 mg tablet Take 81 mg by mouth daily.  cholecalciferol, vitamin d3, (VITAMIN D) 1,000 unit tablet Take 1,000 Units by mouth daily.  cyanocobalamin (VITAMIN B-12) 1,000 mcg tablet Take 1,000 mcg by mouth daily.  simvastatin (ZOCOR) 40 mg tablet Take 1 Tab by mouth nightly for 90 days.  Indications: mixed hyperlipidemia    nitroglycerin (NITROBID) 2 % ointment Apply 0.5 Inches to affected area every four (4) hours. Apply 1/2 inch to the chest when systolic blood pressure is > 170.  albuterol (PROVENTIL HFA, VENTOLIN HFA, PROAIR HFA) 90 mcg/actuation inhaler Take 2 Puffs by inhalation every six (6) hours as needed for Wheezing (use her insurance's generic). No current facility-administered medications for this visit. 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, with chronic kidney disease 05/26/2017     Priority: 1 - One    Hypothyroidism 08/05/2013     Priority: 1 - One    CRI (chronic renal insufficiency) 02/08/2011     Priority: 1 - One    Hyperlipidemia 05/24/2010     Priority: 1 - One    Arthritis 05/24/2010     Priority: 1 - One    Hx-TIA (transient ischemic attack) 05/24/2010     Priority: 1 - One    Migraine 05/24/2010     Priority: 1 - One    Situational stress 06/12/2018     Priority: 6 - Six    S/P hysterectomy 10/10/2014     Priority: 6 - Six       Past Medical History:   Diagnosis Date    Arthritis 5/24/2010    Hx-TIA (transient ischemic attack) 5/24/2010    Hyperlipidemia 5/24/2010    Hypertension 5/24/2010    Migraine 5/24/2010       Allergies   Allergen Reactions    Ace Inhibitors Other (comments)     Cough. Also has unclear reaction to ARB's       Past Surgical History:   Procedure Laterality Date    HX GYN      hysterectomy,btl    HX GYN  9/6/11    complete hysterectomy    HX HEENT  10/10.     bilateral cataract removal.       Social History     Socioeconomic History    Marital status:      Spouse name: Not on file    Number of children: Not on file    Years of education: Not on file    Highest education level: Not on file   Tobacco Use    Smoking status: Never Smoker    Smokeless tobacco: Never Used   Substance and Sexual Activity    Alcohol use: No    Drug use: No       Review of Systems   Constitutional: Negative. Negative for chills, fever, malaise/fatigue and weight loss. HENT: Negative. Negative for hearing loss. Eyes: Negative. Negative for blurred vision and double vision. Respiratory: Negative. Negative for cough, sputum production and shortness of breath. Cardiovascular: Negative. Negative for chest pain and palpitations. Gastrointestinal: Negative. Negative for abdominal pain, blood in stool, heartburn, nausea and vomiting. Genitourinary: Negative. Negative for dysuria, frequency and urgency. Musculoskeletal: Negative. Negative for back pain, falls, myalgias and neck pain. Skin: Negative. Negative for rash. Neurological: Negative. Negative for dizziness, tingling, tremors, weakness and headaches. Endo/Heme/Allergies: Negative. Psychiatric/Behavioral: Negative for depression and memory loss. The patient is nervous/anxious. Alert, calm and cooperative. A pelra lady. Objective:     Vitals:    08/16/19 1536 08/16/19 1615   BP: (!) 170/95 150/62   Pulse: 73    Resp: 20    Temp: 98.8 °F (37.1 °C)    TempSrc: Oral    SpO2: 100%    Weight: 109 lb 3.2 oz (49.5 kg)    Height: 5' 3\" (1.6 m)       Body mass index is 19.34 kg/m². Physical Exam   Constitutional: She is oriented to person, place, and time and well-developed, well-nourished, and in no distress. No distress. HENT:   Head: Normocephalic and atraumatic. Mouth/Throat: Oropharynx is clear and moist.   Eyes: Conjunctivae are normal.   Neck: No thyromegaly present. No carotid bruit. Cardiovascular: Normal rate, regular rhythm and normal heart sounds. No murmur heard. Pulmonary/Chest: Effort normal and breath sounds normal. No respiratory distress. She has no wheezes. She has no rales. Abdominal: Soft.  She exhibits no distension. There is no tenderness. Musculoskeletal: She exhibits no edema. Neurological: She is alert and oriented to person, place, and time. Skin: Skin is warm. No rash noted. She is not diaphoretic. No erythema. Psychiatric: Mood and affect normal.   Nursing note and vitals reviewed. Health Maintenance Due   Topic Date Due    Influenza Age 5 to Adult  08/01/2019         Assessment and orders:     Encounter Diagnoses     ICD-10-CM ICD-9-CM   1. ONEL (generalized anxiety disorder) F41.1 300.02   2. Situational stress F43.9 V62.89   3. CKD (chronic kidney disease) stage 4, GFR 15-29 ml/min (HCC) N18.4 585.4   4. Malignant HTN with heart disease, w/o CHF, with chronic kidney disease I13.10 404.00   5. Anemia, chronic disease D63.8 285.29     Diagnoses and all orders for this visit:    1. ONEL (generalized anxiety disorder)- multiple risks. -     escitalopram oxalate (LEXAPRO) 5 mg tablet; Take 0.5 Tabs by mouth daily. Indications: Repeated Episodes of Anxiety- call with SE.    2. Situational stress- her health and son's health. 3. CKD (chronic kidney disease) stage 4, GFR 15-29 ml/min (HCC)    4. Malignant HTN with heart disease, w/o CHF, with chronic kidney disease    5. Anemia, chronic disease- stable      Plan of care:  Discussed diagnoses in detail with patient. Medication risks/benefits/side effects discussed with patient. All of the patient's questions were addressed. The patient understands and agrees with our plan of care. The patient knows to call back if they are unsure of or forget any changes we discussed today or if the symptoms change. The patient received an After-Visit Summary which contains VS, orders, medication list and allergy list. This can be used as a \"mini-medical record\" should they have to seek medical care while out of town.     Patient Care Team:  Gerhard Bernal MD as PCP - General Luz Elena Garcia MD (Nephrology)    Follow-up and Dispositions    · Return in about 1 month (around 9/13/2019). Future Appointments   Date Time Provider Tiago De Oliveira   9/16/2019  3:35 PM Karma Sanches MD [facility] SCHED   10/30/2019 11:10 AM Karma Sanches MD [facility] SCHED       Signed By: Radha Huffman MD     August 16, 2019      ATTENTION:   This medical record was transcribed using an electronic medical records/speech recognition system. Although proofread, it may and can contain electronic, spelling and other errors. Corrections may be executed at a later time. Please feel free to contact me for any clarifications as needed.

## 2019-08-26 ENCOUNTER — TELEPHONE (OUTPATIENT)
Dept: FAMILY MEDICINE CLINIC | Age: 84
End: 2019-08-26

## 2019-08-26 NOTE — TELEPHONE ENCOUNTER
Fatimah Luna with [facility] called with an update on the patient. She states that the patients heart rate has been between 49-52. Her BP was 137/75 and has been complaining of feeling weak. Dr. Nurys Reese started her on Carvedilol 6.25 mg -- take one tablet twice a day. Informed Dr. Donnell Ruiz of above. Per Dr. Donnell Ruiz: She should take half a tablet of of the Carvedilol twice a day starting tomorrow if she has already had both doses today. Fatimah Luna verbalized understanding and stated that she would cut the pills and put them in the pill box.

## 2019-08-29 NOTE — TELEPHONE ENCOUNTER
Called made to Han Nevarez 81Gregorio with [facility].  Han Thurman41 states pt is asymptomatic and is doing much better but have to inform the physician when heartrate is below 60

## 2019-08-29 NOTE — TELEPHONE ENCOUNTER
James Hernandez from [facility] called. The patient's pulse was was 56 today. BP was 146/60. [facility] can be reached at [phone] with any questions.

## 2019-09-19 ENCOUNTER — TELEPHONE (OUTPATIENT)
Dept: FAMILY MEDICINE CLINIC | Age: 84
End: 2019-09-19

## 2019-09-30 ENCOUNTER — PATIENT OUTREACH (OUTPATIENT)
Dept: FAMILY MEDICINE CLINIC | Age: 84
End: 2019-09-30

## 2019-09-30 NOTE — PROGRESS NOTES
NN unable to reach patient or family by phone to complete LAKEISHA. Voicemail left requesting return call. Patient has PCP appt tomorrow, 10/1/19.

## 2019-10-01 ENCOUNTER — OFFICE VISIT (OUTPATIENT)
Dept: FAMILY MEDICINE CLINIC | Age: 84
End: 2019-10-01

## 2019-10-01 VITALS
TEMPERATURE: 98.4 F | DIASTOLIC BLOOD PRESSURE: 56 MMHG | SYSTOLIC BLOOD PRESSURE: 132 MMHG | OXYGEN SATURATION: 98 % | WEIGHT: 107 LBS | RESPIRATION RATE: 16 BRPM | HEART RATE: 77 BPM | BODY MASS INDEX: 18.96 KG/M2 | HEIGHT: 63 IN

## 2019-10-01 DIAGNOSIS — N18.4 CKD (CHRONIC KIDNEY DISEASE) STAGE 4, GFR 15-29 ML/MIN (HCC): ICD-10-CM

## 2019-10-01 DIAGNOSIS — N39.0 URINARY TRACT INFECTION WITHOUT HEMATURIA, SITE UNSPECIFIED: ICD-10-CM

## 2019-10-01 DIAGNOSIS — N30.01 ACUTE CYSTITIS WITH HEMATURIA: ICD-10-CM

## 2019-10-01 DIAGNOSIS — Z09 HOSPITAL DISCHARGE FOLLOW-UP: Primary | ICD-10-CM

## 2019-10-01 DIAGNOSIS — D63.8 ANEMIA, CHRONIC DISEASE: ICD-10-CM

## 2019-10-01 LAB
BILIRUB UR QL STRIP: NEGATIVE
GLUCOSE UR-MCNC: NEGATIVE MG/DL
KETONES P FAST UR STRIP-MCNC: NEGATIVE MG/DL
PH UR STRIP: 5.5 [PH] (ref 4.6–8)
PROT UR QL STRIP: NEGATIVE
SP GR UR STRIP: 1.01 (ref 1–1.03)
UA UROBILINOGEN AMB POC: NORMAL (ref 0.2–1)
URINALYSIS CLARITY POC: CLEAR
URINALYSIS COLOR POC: YELLOW
URINE BLOOD POC: NEGATIVE
URINE LEUKOCYTES POC: NEGATIVE
URINE NITRITES POC: NEGATIVE

## 2019-10-01 RX ORDER — CEFPODOXIME PROXETIL 200 MG/1
TABLET, FILM COATED ORAL
Refills: 0 | COMMUNITY
Start: 2019-09-27 | End: 2019-01-01 | Stop reason: ALTCHOICE

## 2019-10-01 NOTE — PROGRESS NOTES
.[facility]  [address]  [phone]        Transition of Care Visit    Patient: Coby Mendieta MRN: [id number]  SSN: [id number]    YOB: 1923  Age: 80 y.o. Sex: female      Rach Palacios.  Dates of admission:9/23/19  Discharge diagnoses:UTI, CKD 4, Anemia  State of health at discharge:improved  Surgical or invasive procedures done:none    Amount and/or Complexity of Data Reviewed:   Clinical lab tests: Reviewed or ordered   Tests in the radiology section: reviewed or ordered  Discussion of test results with the patient-yes  Obtain previous medical records or obtain history from someone other than the patient: No  Obtain history from someone other than the patient: no  Review and address past medical records: yes  Discuss the patient with another provider: no  Independant visualization of image, tracing, or specimen: no    Risk of Significant Complications, Morbidity, and/or Mortality:   Presenting problems: High   Diagnostic procedures: Moderate   Management options: Moderate     Transition of Care time spent:   Total time providing care and documentation: 40-60 minutes   Progress at discharge:   Stable on Discharge      Progress Note    Patient: Coby Mendieta MRN: [id number]  SSN: [id number]    YOB: 1923  Age: 80 y.o. Sex: female        Chief Complaint   Patient presents with   [facility] Follow Up: improved, discharged on vantin           Subjective:     Encounter Diagnoses   Name Primary?  Urinary tract infection without hematuria, site unspecified:   Duration of sx: 2 days of symptoms before going to the hospital emergency room.    Dysuria:yes, suprapubic   Frequency: yes   Urgency: yes   Lower back pain: No   CVA pain: no   Fever: no   Nausea, diarrhea or vomiting : no   Hematuria: no   incomplete emptying: no   Hx kidney stone: no   Last UTI: Years ago       Yes    CKD (chronic kidney disease) stage 4, GFR 15-29 ml/min [facility]):  Nephrologist: Dr. Dariel Coles  Lab Results   Component Value Date/Time    GFR est AA 13 (L) 04/15/2019 04:46 PM    GFR est non-AA 11 (L) 04/15/2019 04:46 PM    Creatinine 3.41 (H) 04/15/2019 04:46 PM    BUN 53 (H) 04/15/2019 04:46 PM    Sodium 144 04/15/2019 04:46 PM    Potassium 4.7 04/15/2019 04:46 PM    Chloride 108 (H) 04/15/2019 04:46 PM    CO2 17 (L) 04/15/2019 04:46 PM     Lab Results   Component Value Date/Time    WBC 5.1 04/13/2017 04:36 PM    HGB 10.1 (L) 03/12/2019 03:18 PM    HCT 34.1 04/13/2017 04:36 PM    PLATELET 623 78/84/6576 04:36 PM    MCV 92 04/13/2017 04:36 PM     Lab Results   Component Value Date/Time    Vitamin D 25-Hydroxy 36.6 10/14/2011 10:50 AM    VITAMIN D, 25-HYDROXY 37.8 02/06/2019 12:35 PM       Lab Results   Component Value Date/Time    Calcium 10.2 04/15/2019 04:46 PM    Phosphorus 4.3 04/15/2019 04:46 PM    PTH, Intact 156 (H) 02/06/2019 12:35 PM              Anemia, chronic disease: Her last hemoglobin in the hospital is 9.1- has been running 10 here. No sx. Lab Results   Component Value Date/Time    HGB 10.1 (L) 03/12/2019 03:18 PM     Lab Results   Component Value Date/Time    Iron 58 02/06/2019 12:35 PM    TIBC 258 02/06/2019 12:35 PM    Iron % saturation 22 02/06/2019 12:35 PM    Ferritin 346 (H) 05/15/2012 09:54 AM          Acute cystitis with hematuria: See above. She did see blood in her urine. Current and past medical information:    Current Medications after this visit[de-identified]     Current Outpatient Medications   Medication Sig    cefpodoxime (VANTIN) 200 mg tablet TAKE 1 TABLET BY MOUTH ONCE DAILY    losartan (COZAAR) 100 mg tablet Take 1 Tab by mouth daily.  hydrALAZINE (APRESOLINE) 50 mg tablet TAKE ONE TABLET BY MOUTH 4 TIMES DAILY FOR  HYPERTENSION    levothyroxine (SYNTHROID) 25 mcg tablet TAKE ONE TABLET BY MOUTH ONCE DAILY BEFORE BREAKFAST    simvastatin (ZOCOR) 40 mg tablet Take 1 Tab by mouth nightly.     felodipine (PLENDIL SR) 5 mg 24 hr tablet Take one tablet by mouth daily.  furosemide (LASIX) 20 mg tablet TAKE 2 TABLETS BY MOUTH ONCE DAILY    traMADol (ULTRAM) 50 mg tablet Take 1 Tab by mouth every six (6) hours as needed for Pain. Max Daily Amount: 200 mg.    omega-3 fatty acids-vitamin e (FISH OIL) 1,000 mg Cap Take 1 Cap by mouth.  aspirin delayed-release 81 mg tablet Take 81 mg by mouth daily.  cholecalciferol, vitamin d3, (VITAMIN D) 1,000 unit tablet Take 1,000 Units by mouth daily.  cyanocobalamin (VITAMIN B-12) 1,000 mcg tablet Take 1,000 mcg by mouth daily.  carvedilol (COREG) 6.25 mg tablet TAKE 1 TABLET BY MOUTH TWICE DAILY    escitalopram oxalate (LEXAPRO) 5 mg tablet Take 0.5 Tabs by mouth daily. Indications: Repeated Episodes of Anxiety    simvastatin (ZOCOR) 40 mg tablet Take 1 Tab by mouth nightly for 90 days. Indications: mixed hyperlipidemia    nitroglycerin (NITROBID) 2 % ointment Apply 0.5 Inches to affected area every four (4) hours. Apply 1/2 inch to the chest when systolic blood pressure is > 170.  albuterol (PROVENTIL HFA, VENTOLIN HFA, PROAIR HFA) 90 mcg/actuation inhaler Take 2 Puffs by inhalation every six (6) hours as needed for Wheezing (use her insurance's generic).  fexofenadine (ALLEGRA) 180 mg tablet Take 1 Tab by mouth daily. For allergies. No current facility-administered medications for this visit.         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, with chronic kidney disease 05/26/2017     Priority: 1 - One    Hypothyroidism 08/05/2013     Priority: 1 - One    CRI (chronic renal insufficiency) 02/08/2011     Priority: 1 - One    Hyperlipidemia 05/24/2010     Priority: 1 - One    Arthritis 05/24/2010 Priority: 1 - One    Hx-TIA (transient ischemic attack) 05/24/2010     Priority: 1 - One    Migraine 05/24/2010     Priority: 1 - One    Situational stress 06/12/2018     Priority: 6 - Six    S/P hysterectomy 10/10/2014     Priority: 6 - Six       Past Medical History:   Diagnosis Date    Arthritis 5/24/2010    Hx-TIA (transient ischemic attack) 5/24/2010    Hyperlipidemia 5/24/2010    Hypertension 5/24/2010    Migraine 5/24/2010       Allergies   Allergen Reactions    Ace Inhibitors Other (comments)     Cough. Also has unclear reaction to ARB's       Past Surgical History:   Procedure Laterality Date    HX GYN      hysterectomy,btl    HX GYN  9/6/11    complete hysterectomy    HX HEENT  10/10. bilateral cataract removal.       Social History     Socioeconomic History    Marital status:      Spouse name: Not on file    Number of children: Not on file    Years of education: Not on file    Highest education level: Not on file   Tobacco Use    Smoking status: Never Smoker    Smokeless tobacco: Never Used   Substance and Sexual Activity    Alcohol use: No    Drug use: No       Review of Systems   Constitutional: Negative. Negative for chills, fever, malaise/fatigue and weight loss. HENT: Negative. Negative for hearing loss. Eyes: Negative. Negative for blurred vision and double vision. Respiratory: Negative. Negative for cough, sputum production and shortness of breath. Cardiovascular: Negative. Negative for chest pain and palpitations. Gastrointestinal: Negative. Negative for abdominal pain, blood in stool, heartburn, nausea and vomiting. Genitourinary: Negative. Negative for dysuria, frequency and urgency.  symptoms have completely resolved today. Musculoskeletal: Negative. Negative for back pain, falls, myalgias and neck pain. Skin: Negative. Negative for rash. Neurological: Negative.   Negative for dizziness, tingling, tremors, weakness and headaches. Endo/Heme/Allergies: Negative. Psychiatric/Behavioral: Negative. Negative for depression. Objective:     Vitals:    10/01/19 1136   BP: 132/56   Pulse: 77   Resp: 16   Temp: 98.4 °F (36.9 °C)   TempSrc: Oral   SpO2: 98%   Weight: 107 lb (48.5 kg)   Height: 5' 3\" (1.6 m)      Body mass index is 18.95 kg/m². Physical Exam   Constitutional: She is oriented to person, place, and time. She appears well-developed and well-nourished. HENT:   Head: Normocephalic and atraumatic. Mouth/Throat: Oropharynx is clear and moist.   Eyes: Conjunctivae are normal. No scleral icterus. Neck: No thyromegaly present. Cardiovascular: Normal rate, regular rhythm and normal heart sounds. Exam reveals no gallop and no friction rub. No murmur heard. Pulmonary/Chest: Breath sounds normal. No respiratory distress. She has no wheezes. She has no rales. Abdominal: Soft. She exhibits no distension. There is no tenderness. There is no rebound and no guarding. No suprapubic tenderness. Musculoskeletal: She exhibits no deformity. Lymphadenopathy:     She has no cervical adenopathy. Neurological: She is alert and oriented to person, place, and time. Skin: Skin is warm and dry. No rash noted. Psychiatric: She has a normal mood and affect. Judgment normal.   Nursing note and vitals reviewed. Health Maintenance Due   Topic Date Due    Influenza Age 5 to Adult  08/01/2019       Assessment and orders:       ICD-10-CM ICD-9-CM    1. Urinary tract infection without hematuria, site unspecified N39.0 599.0 AMB POC URINALYSIS DIP STICK AUTO W/O MICRO      CULTURE, URINE      CBC WITH AUTOMATED DIFF   2. CKD (chronic kidney disease) stage 4, GFR 15-29 ml/min ([facility]) N18.4 585.4 RENAL FUNCTION PANEL      CBC WITH AUTOMATED DIFF   3. Anemia, chronic disease D63.8 285.29 CBC WITH AUTOMATED DIFF   4.  Acute cystitis with hematuria N30.01 595.0 CBC WITH AUTOMATED DIFF       Diagnoses and all orders for this visit:    Hospital discharge follow-up    1. Urinary tract infection without hematuria, site unspecified-symptoms resolved. No leukocytes on urine dipstick. Reculture to ensure clearing of infection due to advanced age. -     AMB POC URINALYSIS DIP STICK AUTO W/O MICRO  -     CULTURE, URINE  -     CBC WITH AUTOMATED DIFF    2. CKD (chronic kidney disease) stage 4, GFR 15-29 ml/min ([facility])-retest.  You to see Dr. Flakito Pete soon.  -     RENAL FUNCTION PANEL  -     CBC WITH AUTOMATED DIFF    3. Anemia, chronic disease-retest  -     CBC WITH AUTOMATED DIFF    4. Acute cystitis with hematuria  -     CBC WITH AUTOMATED DIFF          Patient Care Team:  Mahsa Salcedo MD as PCP - Fadi Hui MD (Nephrology)  Kiki Graham, RN as Transitional Nurse Navigator       Plan of care:  Discussed diagnoses in detail with patient. Medication risks/benefits/side effects discussed with patient. All of the patient's questions were addressed. The patient understands and agrees with our plan of care. The patient knows to call back if they are unsure of or forget any changes we discussed today or if the symptoms change. The patient received an After-Visit Summary which contains VS, orders, medication list and allergy list. This can be used as a \"mini-medical record\" should they have to seek medical care while out of town. Follow-up and Dispositions    · Return in about 4 weeks (around 10/29/2019).          Future Appointments   Date Time Provider Tiago De Oliveira   10/30/2019 11:10 AM Mahsa Salcedo MD [facility] SCHED       Signed By: Lestine Sever, MD     October 1, 2019

## 2019-10-01 NOTE — PROGRESS NOTES
1. Have you been to the ER, urgent care clinic since your last visit? Hospitalized since your last visit? Yes When: [facility], [facility], 09/23/2019    2. Have you seen or consulted any other health care providers outside of the [address] [place] [address] since your last visit? Include any pap smears or colon screening.  No  Reviewed record in preparation for visit and have necessary documentation  Pt did not bring medication to office visit for review    Goals that were addressed and/or need to be completed during or after this appointment include     Health Maintenance Due   Topic Date Due    Influenza Age 5 to Adult  08/01/2019

## 2019-10-02 LAB
ALBUMIN SERPL-MCNC: 4.2 G/DL (ref 3.2–4.6)
BASOPHILS # BLD AUTO: 0 X10E3/UL (ref 0–0.2)
BASOPHILS NFR BLD AUTO: 1 %
BUN SERPL-MCNC: 79 MG/DL (ref 10–36)
BUN/CREAT SERPL: 23 (ref 12–28)
CALCIUM SERPL-MCNC: 10.9 MG/DL (ref 8.7–10.3)
CHLORIDE SERPL-SCNC: 105 MMOL/L (ref 96–106)
CO2 SERPL-SCNC: 19 MMOL/L (ref 20–29)
CREAT SERPL-MCNC: 3.46 MG/DL (ref 0.57–1)
EOSINOPHIL # BLD AUTO: 0.3 X10E3/UL (ref 0–0.4)
EOSINOPHIL NFR BLD AUTO: 5 %
ERYTHROCYTE [DISTWIDTH] IN BLOOD BY AUTOMATED COUNT: 13.2 % (ref 12.3–15.4)
GLUCOSE SERPL-MCNC: 89 MG/DL (ref 65–99)
HCT VFR BLD AUTO: 27.4 % (ref 34–46.6)
HGB BLD-MCNC: 8.8 G/DL (ref 11.1–15.9)
IMM GRANULOCYTES # BLD AUTO: 0 X10E3/UL (ref 0–0.1)
IMM GRANULOCYTES NFR BLD AUTO: 0 %
INTERPRETATION: NORMAL
LYMPHOCYTES # BLD AUTO: 1.3 X10E3/UL (ref 0.7–3.1)
LYMPHOCYTES NFR BLD AUTO: 24 %
MCH RBC QN AUTO: 29.9 PG (ref 26.6–33)
MCHC RBC AUTO-ENTMCNC: 32.1 G/DL (ref 31.5–35.7)
MCV RBC AUTO: 93 FL (ref 79–97)
MONOCYTES # BLD AUTO: 0.6 X10E3/UL (ref 0.1–0.9)
MONOCYTES NFR BLD AUTO: 12 %
NEUTROPHILS # BLD AUTO: 3 X10E3/UL (ref 1.4–7)
NEUTROPHILS NFR BLD AUTO: 58 %
PHOSPHATE SERPL-MCNC: 4.4 MG/DL (ref 2.5–4.5)
PLATELET # BLD AUTO: 154 X10E3/UL (ref 150–450)
POTASSIUM SERPL-SCNC: 4.7 MMOL/L (ref 3.5–5.2)
RBC # BLD AUTO: 2.94 X10E6/UL (ref 3.77–5.28)
SODIUM SERPL-SCNC: 139 MMOL/L (ref 134–144)
WBC # BLD AUTO: 5.2 X10E3/UL (ref 3.4–10.8)

## 2019-10-03 LAB — BACTERIA UR CULT: NORMAL

## 2019-10-03 NOTE — PROGRESS NOTES
Please call patient's daughter. Lab work is stable with exception of one test this year she should be drinking more water to stay well-hydrated.   Thank you,  Dr. Gramajo Fears

## 2019-10-04 ENCOUNTER — TELEPHONE (OUTPATIENT)
Dept: FAMILY MEDICINE CLINIC | Age: 84
End: 2019-10-04

## 2019-10-04 LAB
IRON SATN MFR SERPL: 19 % (ref 15–55)
IRON SERPL-MCNC: 52 UG/DL (ref 27–139)
SPECIMEN STATUS REPORT, ROLRST: NORMAL
TIBC SERPL-MCNC: 273 UG/DL (ref 250–450)
UIBC SERPL-MCNC: 221 UG/DL (ref 118–369)

## 2019-10-04 NOTE — TELEPHONE ENCOUNTER
Attempted to call daughter. No answer. Message left. All labs stable. Patient needs to drink more water to stay hydrated.

## 2019-10-14 ENCOUNTER — TELEPHONE (OUTPATIENT)
Dept: FAMILY MEDICINE CLINIC | Age: 84
End: 2019-10-14

## 2019-10-14 NOTE — TELEPHONE ENCOUNTER
Called and spoke to [place]. Advised per Dr. Camille Guzman:  *Decrease dose of 1/2 pill coreg to just am. *    Also, called Alec Solomon, no answer. Voicemail left. Need to advise her also of these new directions.

## 2019-10-14 NOTE — TELEPHONE ENCOUNTER
Caller's first/last name:  Elvis Jose    Reason for call:  BP/Pulse    Does caller want a return call?  yes    Best contact number:  [phone]    Further clarification of call:      Kati Posadas from [facility] called. Patients pulse has been under 55 for the last five days. Patient was instructed to take half a tablet of of the Carvedilol twice a day and report back if the pulse went under 55. BP today was 130/48. Please advise Kati Posadas at [phone].

## 2019-10-14 NOTE — TELEPHONE ENCOUNTER
Olya Cooper, daughter, called back. Advised per Dr. Joseph Hernández to decrease dose of Coreg to 1/2 pill just in the am.  Olya Cooper voiced understanding.

## 2019-10-30 NOTE — PATIENT INSTRUCTIONS

## 2019-10-30 NOTE — PROGRESS NOTES
1. Have you been to the ER, urgent care clinic since your last visit? Hospitalized since your last visit? No    2. Have you seen or consulted any other health care providers outside of the [address] [place] [address] since your last visit? Include any pap smears or colon screening.  No  Reviewed record in preparation for visit and have necessary documentation  Pt did not bring medication to office visit for review    Goals that were addressed and/or need to be completed during or after this appointment include     Health Maintenance Due   Topic Date Due    Pneumococcal 65+ years (2 of 2 - PPSV23) 12/10/2016    Influenza Age 5 to Adult  08/01/2019

## 2019-10-30 NOTE — PROGRESS NOTES
[address]  [phone]           Progress Note    Patient: Tommy Conn MRN: [id number]  SSN: [id number]    YOB: 1923  Age: 80 y.o. Sex: female        Chief Complaint   Patient presents with    Follow-up         Subjective:     Encounter Diagnoses   Name Primary?  Malignant HTN with heart disease, w/o CHF, with chronic kidney disease: Well-controlled without symptoms. BP Readings from Last 3 Encounters:   10/30/19 110/58   10/01/19 132/56   08/16/19 150/62     The patient reports:  taking medications as instructed, no medication side effects noted, no TIA's, no chest pain on exertion, no dyspnea on exertion, no swelling of ankles. Key CAD CHF Meds             felodipine (PLENDIL SR) 5 mg 24 hr tablet (Taking) Take one tablet by mouth daily. furosemide (LASIX) 20 mg tablet (Taking) TAKE 2 TABLETS BY MOUTH ONCE DAILY    carvedilol (COREG) 6.25 mg tablet (Taking) TAKE 1 TABLET BY MOUTH TWICE DAILY    losartan (COZAAR) 100 mg tablet (Taking) Take 1 Tab by mouth daily. hydrALAZINE (APRESOLINE) 50 mg tablet (Taking) TAKE ONE TABLET BY MOUTH 4 TIMES DAILY FOR  HYPERTENSION    simvastatin (ZOCOR) 40 mg tablet (Taking) Take 1 Tab by mouth nightly. omega-3 fatty acids-vitamin e (FISH OIL) 1,000 mg Cap (Taking) Take 1 Cap by mouth. aspirin delayed-release 81 mg tablet (Taking) Take 81 mg by mouth daily. simvastatin (ZOCOR) 40 mg tablet Take 1 Tab by mouth nightly for 90 days. Indications: mixed hyperlipidemia    nitroglycerin (NITROBID) 2 % ointment Apply 0.5 Inches to affected area every four (4) hours. Apply 1/2 inch to the chest when systolic blood pressure is > 170.            Lab Results   Component Value Date/Time    Sodium 139 10/01/2019 01:11 PM    Potassium 4.7 10/01/2019 01:11 PM    Chloride 105 10/01/2019 01:11 PM    CO2 19 (L) 10/01/2019 01:11 PM    Anion gap 10 10/06/2010 10:34 AM Glucose 89 10/01/2019 01:11 PM    BUN 79 (HH) 10/01/2019 01:11 PM    Creatinine 3.46 (H) 10/01/2019 01:11 PM    BUN/Creatinine ratio 23 10/01/2019 01:11 PM    GFR est AA 12 (L) 10/01/2019 01:11 PM    GFR est non-AA 11 (L) 10/01/2019 01:11 PM    Calcium 10.9 (H) 10/01/2019 01:11 PM    Bilirubin, total 0.4 07/11/2018 09:36 AM    AST (SGOT) 12 02/06/2019 12:35 PM    Alk. phosphatase 79 07/11/2018 09:36 AM    Protein, total 7.4 07/11/2018 09:36 AM    Albumin 4.2 10/01/2019 01:11 PM    Globulin 3.2 10/06/2010 10:34 AM    A-G Ratio 1.6 07/11/2018 09:36 AM    ALT (SGPT) 10 02/06/2019 12:35 PM     Low salt diet? yes  Aerobic exercise? no  Our goal is to normalize the blood pressure to decrease the risks of strokes and heart attacks. The patient is in agreement with the plan.        Yes    CKD (chronic kidney disease) stage 4, GFR 15-29 ml/min [facility]):  Nephrologist:   Lab Results   Component Value Date/Time    GFR est AA 12 (L) 10/01/2019 01:11 PM    GFR est non-AA 11 (L) 10/01/2019 01:11 PM    Creatinine 3.46 (H) 10/01/2019 01:11 PM    BUN 79 (HH) 10/01/2019 01:11 PM    Sodium 139 10/01/2019 01:11 PM    Potassium 4.7 10/01/2019 01:11 PM    Chloride 105 10/01/2019 01:11 PM    CO2 19 (L) 10/01/2019 01:11 PM     Lab Results   Component Value Date/Time    WBC 5.2 10/01/2019 01:11 PM    HGB 8.8 (L) 10/01/2019 01:11 PM    HCT 27.4 (L) 10/01/2019 01:11 PM    PLATELET 337 42/85/2990 01:11 PM    MCV 93 10/01/2019 01:11 PM     Lab Results   Component Value Date/Time    Vitamin D 25-Hydroxy 36.6 10/14/2011 10:50 AM    VITAMIN D, 25-HYDROXY 37.8 02/06/2019 12:35 PM       Lab Results   Component Value Date/Time    Calcium 10.9 (H) 10/01/2019 01:11 PM    Phosphorus 4.4 10/01/2019 01:11 PM    PTH, Intact 156 (H) 02/06/2019 12:35 PM              Hypothyroidism due to acquired atrophy of thyroid:  Lab Results   Component Value Date/Time    TSH 3.450 06/12/2018 10:10 AM    T4, Free 1.22 02/06/2019 12:35 PM      Denies fatigue, nervousness,weight changes, heat orcold intolerance, bowel changes,skin changes, cardiovascular symptoms, hair loss, feeling excessive energy, tremor, palpitations and weight loss. Thyroid medication has been unchanged since last medication check and labs.  Anemia, chronic disease:  No sx. Lab Results   Component Value Date/Time    HGB 8.8 (L) 10/01/2019 01:11 PM     Lab Results   Component Value Date/Time    Iron 52 10/01/2019 01:11 PM    TIBC 273 10/01/2019 01:11 PM    Iron % saturation 19 10/01/2019 01:11 PM    Ferritin 346 (H) 05/15/2012 09:54 AM          Encounter for immunization: Flu shot given today. Current and past medical information:    Current Medications after this visit[de-identified]     Current Outpatient Medications   Medication Sig    felodipine (PLENDIL SR) 5 mg 24 hr tablet Take one tablet by mouth daily.  levothyroxine (SYNTHROID) 25 mcg tablet TAKE ONE TABLET BY MOUTH ONCE DAILY BEFORE BREAKFAST    furosemide (LASIX) 20 mg tablet TAKE 2 TABLETS BY MOUTH ONCE DAILY    carvedilol (COREG) 6.25 mg tablet TAKE 1 TABLET BY MOUTH TWICE DAILY    losartan (COZAAR) 100 mg tablet Take 1 Tab by mouth daily.  hydrALAZINE (APRESOLINE) 50 mg tablet TAKE ONE TABLET BY MOUTH 4 TIMES DAILY FOR  HYPERTENSION    simvastatin (ZOCOR) 40 mg tablet Take 1 Tab by mouth nightly.  omega-3 fatty acids-vitamin e (FISH OIL) 1,000 mg Cap Take 1 Cap by mouth.  aspirin delayed-release 81 mg tablet Take 81 mg by mouth daily.  cholecalciferol, vitamin d3, (VITAMIN D) 1,000 unit tablet Take 1,000 Units by mouth daily.  cyanocobalamin (VITAMIN B-12) 1,000 mcg tablet Take 1,000 mcg by mouth daily.  escitalopram oxalate (LEXAPRO) 5 mg tablet Take 0.5 Tabs by mouth daily. Indications: Repeated Episodes of Anxiety    traMADol (ULTRAM) 50 mg tablet Take 1 Tab by mouth every six (6) hours as needed for Pain. Max Daily Amount: 200 mg.     simvastatin (ZOCOR) 40 mg tablet Take 1 Tab by mouth nightly for 90 days. Indications: mixed hyperlipidemia    nitroglycerin (NITROBID) 2 % ointment Apply 0.5 Inches to affected area every four (4) hours. Apply 1/2 inch to the chest when systolic blood pressure is > 170.  albuterol (PROVENTIL HFA, VENTOLIN HFA, PROAIR HFA) 90 mcg/actuation inhaler Take 2 Puffs by inhalation every six (6) hours as needed for Wheezing (use her insurance's generic).  fexofenadine (ALLEGRA) 180 mg tablet Take 1 Tab by mouth daily. For allergies. No current facility-administered medications for this visit. Patient Active Problem List    Diagnosis Date Noted    Orthostasis 07/29/2019     Priority: 1 - One    CKD (chronic kidney disease) stage 4, GFR 15-29 ml/min ([facility]) 04/15/2019     Priority: 1 - One    Anemia, chronic disease 03/12/2019     Priority: 1 - One    Malignant HTN with heart disease, w/o CHF, with chronic kidney disease 05/26/2017     Priority: 1 - One    Hypothyroidism 08/05/2013     Priority: 1 - One    CRI (chronic renal insufficiency) 02/08/2011     Priority: 1 - One    Hyperlipidemia 05/24/2010     Priority: 1 - One    Arthritis 05/24/2010     Priority: 1 - One    Hx-TIA (transient ischemic attack) 05/24/2010     Priority: 1 - One    Migraine 05/24/2010     Priority: 1 - One    Situational stress 06/12/2018     Priority: 6 - Six    S/P hysterectomy 10/10/2014     Priority: 6 - Six       Past Medical History:   Diagnosis Date    Arthritis 5/24/2010    Hx-TIA (transient ischemic attack) 5/24/2010    Hyperlipidemia 5/24/2010    Hypertension 5/24/2010    Migraine 5/24/2010       Allergies   Allergen Reactions    Ace Inhibitors Other (comments)     Cough. Also has unclear reaction to ARB's       Past Surgical History:   Procedure Laterality Date    HX GYN      hysterectomy,btl    HX GYN  9/6/11    complete hysterectomy    HX HEENT  10/10.     bilateral cataract removal.       Social History     Socioeconomic History    Marital status:  Spouse name: Not on file    Number of children: Not on file    Years of education: Not on file    Highest education level: Not on file   Tobacco Use    Smoking status: Never Smoker    Smokeless tobacco: Never Used   Substance and Sexual Activity    Alcohol use: No    Drug use: No       Review of Systems   Constitutional: Negative. Negative for chills, fever, malaise/fatigue and weight loss. HENT: Negative. Negative for hearing loss. Eyes: Negative. Negative for blurred vision and double vision. Respiratory: Negative. Negative for cough, sputum production and shortness of breath. Cardiovascular: Negative. Negative for chest pain and palpitations. Gastrointestinal: Negative. Negative for abdominal pain, blood in stool, heartburn, nausea and vomiting. Genitourinary: Negative. Negative for dysuria, frequency and urgency. Musculoskeletal: Negative. Negative for back pain, falls, myalgias and neck pain. Skin: Negative. Negative for rash. Neurological: Negative. Negative for dizziness, tingling, tremors, weakness and headaches. Endo/Heme/Allergies: Negative. Psychiatric/Behavioral: Negative. Negative for depression. Objective:     Vitals:    10/30/19 1120   BP: 110/58   Pulse: 69   Resp: 16   Temp: 97.7 °F (36.5 °C)   TempSrc: Oral   SpO2: 100%   Weight: 105 lb (47.6 kg)   Height: 5' 3\" (1.6 m)      Body mass index is 18.6 kg/m². Physical Exam   Constitutional: She is oriented to person, place, and time and well-developed, well-nourished, and in no distress. No distress. HENT:   Head: Normocephalic and atraumatic. Mouth/Throat: Oropharynx is clear and moist.   Eyes: Conjunctivae are normal.   Neck: No thyromegaly present. No carotid bruit. Cardiovascular: Normal rate, regular rhythm and normal heart sounds. No murmur heard. Pulmonary/Chest: Effort normal and breath sounds normal. No respiratory distress. She has no wheezes. She has no rales. Abdominal: Soft. She exhibits no distension. There is no tenderness. There is no rebound and no guarding. Musculoskeletal: She exhibits no edema. Neurological: She is alert and oriented to person, place, and time. Skin: Skin is warm. No rash noted. She is not diaphoretic. No erythema. Psychiatric: Mood and affect normal.   Nursing note and vitals reviewed. Health Maintenance Due   Topic Date Due    Pneumococcal 65+ years (2 of 2 - PPSV23) 12/10/2016    Influenza Age 5 to Adult  08/01/2019         Assessment and orders:     Encounter Diagnoses     ICD-10-CM ICD-9-CM   1. 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 CKD (chronic kidney disease) stage 4, GFR 15-29 ml/min (HCC) N18.4 585.4   3. Hypothyroidism due to acquired atrophy of thyroid E03.4 244.8     246.8   4. Anemia, chronic disease D63.8 285.29   5. Encounter for immunization Z23 V03.89     Diagnoses and all orders for this visit:    1. Malignant HTN with heart disease, w/o CHF, with chronic kidney disease-well-controlled  -     felodipine (PLENDIL SR) 5 mg 24 hr tablet; Take one tablet by mouth daily.  -     METABOLIC PANEL, COMPREHENSIVE  -     HEMOGLOBIN  -     T4, FREE    2. CKD (chronic kidney disease) stage 4, GFR 15-29 ml/min (HCC)-retest.  She is lost 4 pounds in the last month. I asked her to start using 2 supplements such as Ensure or boost daily if needed to keep her weight stable and preserve her muscle mass. She has a very attentive daughter who takes care of her.  -     METABOLIC PANEL, COMPREHENSIVE    3. Hypothyroidism due to acquired atrophy of thyroid-renewed medication  -     levothyroxine (SYNTHROID) 25 mcg tablet; TAKE ONE TABLET BY MOUTH ONCE DAILY BEFORE BREAKFAST  -     T4, FREE    4. Anemia, chronic disease-retest  -     HEMOGLOBIN    5.  Encounter for immunization  -     ADMIN INFLUENZA VIRUS VAC  -     INFLUENZA VACCINE INACTIVATED (IIV), SUBUNIT, ADJUVANTED, IM            Plan of care:  Discussed diagnoses in detail with patient. Medication risks/benefits/side effects discussed with patient. All of the patient's questions were addressed. The patient understands and agrees with our plan of care. The patient knows to call back if they are unsure of or forget any changes we discussed today or if the symptoms change. The patient received an After-Visit Summary which contains VS, orders, medication list and allergy list. This can be used as a \"mini-medical record\" should they have to seek medical care while out of town. Patient Care Team:  Marcie Moore MD as PCP - Brenden Jarrett MD as PCP - [facility] Empaneled Provider  Deitra Favre, MD (Nephrology)  Shannan Silva RN as Transitional Nurse Lesa Braden RN as Ambulatory Care Navigator    Follow-up and Dispositions    · Return in about 2 months (around 12/30/2019). No future appointments. Signed By: Ivelisse Chahal MD     October 30, 2019      ATTENTION:   This medical record was transcribed using an electronic medical records/speech recognition system. Although proofread, it may and can contain electronic, spelling and other errors. Corrections may be executed at a later time. Please feel free to contact me for any clarifications as needed.

## 2019-10-31 NOTE — TELEPHONE ENCOUNTER
Attempted to call. No answer. Message left. Need to advise patient per Dr. Florian Gosselin:  With exception of a slight decrease in kidney function your labs are stable. Stay well hydrated by drinking more water and we will repeat this next month so schedule the appointment.   I will contact the nephrology office to see about getting you a timely appointment scheduled.

## 2019-11-06 NOTE — PROGRESS NOTES
Ambulatory Care Manager outreached to patient today to offer care management services. Introduction to self and role of care manager provided. Patient accepted care management services at this time. Follow up call scheduled at this time. Patient has Ambulatory Care Manager's contact number for for any questions or concerns.

## 2019-11-22 NOTE — TELEPHONE ENCOUNTER
Caller's first/last name:  Ray Bonilla    Reason for call:  Hospice    Does caller want a return call?  yes    Best contact number:  [phone]    Further clarification of call:      Ray Chad from Hospice called. Trevin referred the patient to them. Ray Bonilla is there with the patient now. She needs to know will Dr. Jessica Goldstein continue to follow her or does he want their medical director to follow. Patient will keep Monday's appointment.   Please call her back at [phone]

## 2019-11-22 NOTE — PROGRESS NOTES
Ambulatory Care Management Note 
 
 
Date/Time:  11/22/2019 1:04 PM 
 
This patient was received as a referral from [employer] [facility] Top Challenges reviewed with the provider Ambulatory  contacted patient for discussion and case managements of malignant hypertension, CKD, chronic anemia Summary of patients top problems: 1. Malignant hypertension 2. CKD 3. Chronic anemia Patient's challenges to self management identified:   none identified at this time Medication Management:  good adherence, good understanding Advance Care Planning:  
Does patient have an Advance Directive:  reviewed and current [employer], Referrals, and Durable Medical Equipment: n/a 
 
PCP/Specialist follow up:  
Future Appointments Date Time Provider Tiago De Oliveira 11/25/2019  2:45 PM Janina Dsouza MD [facility]  Attends follow-up appointments as directed. 11/22/19: 
Patient reminded on appt date and time 11/25/19 2:45  Supportive resources in place to maintain patient in the community (ie. Home Health, DME equipment, refer to, medication assistant plan, etc.)   
  11/22/19: 
Patient has PCA one day a week. Family member lives with her and assists with medications. (will bring all current meds to appt on 11/25/19. Patient unable to perform med rec on phone).  Understands red flags post discharge. 11/22/19: 
Patient follows low sodium diet. Reports she uses ms dash vs salt. Patient verbalized understanding of all information discussed. Patient has this Nurse Navigators contact information for any further questions, concerns, or needs.

## 2019-11-25 NOTE — TELEPHONE ENCOUNTER
Called and spoke to [facility]. Advised her per Dr. Dorie Grissom that the medical director need to follow patients orders due to narcotic regulations. She verbalized understanding.  She request that we fax them patient labs drawn today to [phone]

## 2019-11-25 NOTE — PROGRESS NOTES
[address], [address] [place] [address] 
[phone] Progress Note Patient: Anders Ruby MRN: [id number]  SSN: [id number] YOB: 1923  Age: 80 y.o. Sex: female Chief Complaint Patient presents with  Chronic Kidney Disease Subjective:  
 
Encounter Diagnoses Name Primary?  Malignant HTN with heart disease, w/o CHF, with chronic kidney disease: Blood pressure is well controlled BP Readings from Last 3 Encounters:  
11/25/19 110/63  
10/30/19 110/58  
10/01/19 132/56 The patient reports:  taking medications as instructed, no medication side effects noted, no TIA's, no chest pain on exertion, no dyspnea on exertion, no swelling of ankles. Key CAD CHF Meds   
    
  
 felodipine (PLENDIL SR) 5 mg 24 hr tablet (Taking) Take one tablet by mouth daily. furosemide (LASIX) 20 mg tablet (Taking) TAKE 2 TABLETS BY MOUTH ONCE DAILY  
 carvedilol (COREG) 6.25 mg tablet (Taking) TAKE 1 TABLET BY MOUTH TWICE DAILY losartan (COZAAR) 100 mg tablet (Taking) Take 1 Tab by mouth daily. hydrALAZINE (APRESOLINE) 50 mg tablet (Taking) TAKE ONE TABLET BY MOUTH 4 TIMES DAILY FOR  HYPERTENSION  
 simvastatin (ZOCOR) 40 mg tablet (Taking) Take 1 Tab by mouth nightly. nitroglycerin (NITROBID) 2 % ointment (Taking) Apply 0.5 Inches to affected area every four (4) hours. Apply 1/2 inch to the chest when systolic blood pressure is > 170.  
 omega-3 fatty acids-vitamin e (FISH OIL) 1,000 mg Cap (Taking) Take 1 Cap by mouth. aspirin delayed-release 81 mg tablet (Taking) Take 81 mg by mouth daily. simvastatin (ZOCOR) 40 mg tablet Take 1 Tab by mouth nightly for 90 days. Indications: mixed hyperlipidemia Lab Results Component Value Date/Time  Sodium 142 10/30/2019 02:01 PM  
 Potassium 3.8 10/30/2019 02:01 PM  
 Chloride 99 10/30/2019 02:01 PM  
 CO2 20 10/30/2019 02:01 PM  
 Anion gap 10 10/06/2010 10:34 AM  
 Glucose 106 (H) 10/30/2019 02:01 PM  
  (HH) 10/30/2019 02:01 PM  
 Creatinine 3.92 (H) 10/30/2019 02:01 PM  
 BUN/Creatinine ratio 26 10/30/2019 02:01 PM  
 GFR est AA 11 (L) 10/30/2019 02:01 PM  
 GFR est non-AA 9 (L) 10/30/2019 02:01 PM  
 Calcium 11.1 (H) 10/30/2019 02:01 PM  
 Bilirubin, total 0.3 10/30/2019 02:01 PM  
 AST (SGOT) 13 10/30/2019 02:01 PM  
 Alk. phosphatase 56 10/30/2019 02:01 PM  
 Protein, total 6.6 10/30/2019 02:01 PM  
 Albumin 4.4 10/30/2019 02:01 PM  
 Globulin 3.2 10/06/2010 10:34 AM  
 A-G Ratio 2.0 10/30/2019 02:01 PM  
 ALT (SGPT) 7 10/30/2019 02:01 PM  
 
Low salt diet? yes Aerobic exercise? no 
Our goal is to normalize the blood pressure to decrease the risks of strokes and heart attacks. The patient is in agreement with the plan. Yes  Pure hypercholesterolemia: 
Cardiovascular risks for her are: LDL goal is under 130 
hypertension 
hyperlipidemia. Key Antihyperlipidemia Meds   
    
  
 simvastatin (ZOCOR) 40 mg tablet (Taking) Take 1 Tab by mouth nightly. omega-3 fatty acids-vitamin e (FISH OIL) 1,000 mg Cap (Taking) Take 1 Cap by mouth. simvastatin (ZOCOR) 40 mg tablet Take 1 Tab by mouth nightly for 90 days. Indications: mixed hyperlipidemia Lab Results Component Value Date/Time Cholesterol, total 188 02/06/2019 12:35 PM  
 HDL Cholesterol 63 02/06/2019 12:35 PM  
 LDL, calculated 104 (H) 02/06/2019 12:35 PM  
 Triglyceride 104 02/06/2019 12:35 PM  
 CHOL/HDL Ratio 3.3 10/06/2010 10:34 AM  
 
Lab Results Component Value Date/Time ALT (SGPT) 7 10/30/2019 02:01 PM  
 AST (SGOT) 13 10/30/2019 02:01 PM  
 Alk. phosphatase 56 10/30/2019 02:01 PM  
 Bilirubin, total 0.3 10/30/2019 02:01 PM  
 
 Myalgias: No 
 Fatigue: No 
 Other side effects: no Wt Readings from Last 3 Encounters:  
11/25/19 99 lb (44.9 kg) 10/30/19 105 lb (47.6 kg) 10/01/19 107 lb (48.5 kg) The patient is aware of our goal to reduce or eliminate the long term problems (such as strokes and heart attacks) related to poorly controlled hyperlipidemia.  Hypothyroidism due to acquired atrophy of thyroid: 
Lab Results Component Value Date/Time TSH 3.450 06/12/2018 10:10 AM  
 T4, Free 1.16 10/30/2019 02:01 PM  
 
 Denies fatigue, nervousness,weight changes, heat orcold intolerance, bowel changes,skin changes, cardiovascular symptoms, hair loss, feeling excessive energy, tremor, palpitations and weight loss. Thyroid medication has been unchanged since last medication check and labs.  Hospice care: She and her daughter have made the decision to enter hospice care. She has adamantly refused any type of dialysis. As far as she knows she does not have a no CPR sheet post so we filled out that form today. I will go ahead and order her lab work today since she is due to see the nephrologist.  Lab work may assist him in managing her medicines better even knowing that she is not going to have dialysis. I explained to the patient and her daughter that even though she would be under the hospice medical director care from hospice standpoint that they can see or contact me anytime that they feel necessary. With the recent changes in narcotic rules it is not possible for us to follow patients once they enter hospice.  CKD (chronic kidney disease) stage 4, GFR 15-29 ml/min [facility]):  
Nephrologist: Dr. March November Lab Results Component Value Date/Time GFR est AA 11 (L) 10/30/2019 02:01 PM  
 GFR est non-AA 9 (L) 10/30/2019 02:01 PM  
 Creatinine 3.92 (H) 10/30/2019 02:01 PM  
  (HH) 10/30/2019 02:01 PM  
 Sodium 142 10/30/2019 02:01 PM  
 Potassium 3.8 10/30/2019 02:01 PM  
 Chloride 99 10/30/2019 02:01 PM  
 CO2 20 10/30/2019 02:01 PM  
 
Lab Results Component Value Date/Time  WBC 5.2 10/01/2019 01:11 PM  
 HGB 10.3 (L) 10/30/2019 02:01 PM  
 HCT 27.4 (L) 10/01/2019 01:11 PM  
 PLATELET 275 95/06/6055 01:11 PM  
 MCV 93 10/01/2019 01:11 PM  
 
Lab Results Component Value Date/Time Vitamin D 25-Hydroxy 36.6 10/14/2011 10:50 AM  
 VITAMIN D, 25-HYDROXY 37.8 02/06/2019 12:35 PM  
   
Lab Results Component Value Date/Time Calcium 11.1 (H) 10/30/2019 02:01 PM  
 Phosphorus 4.4 10/01/2019 01:11 PM  
 PTH, Intact 156 (H) 02/06/2019 12:35 PM  
 
 
 
   
 
 
 
 
 
Current and past medical information: 
 
Current Medications after this visit[de-identified]    
Current Outpatient Medications Medication Sig  
 felodipine (PLENDIL SR) 5 mg 24 hr tablet Take one tablet by mouth daily.  levothyroxine (SYNTHROID) 25 mcg tablet TAKE ONE TABLET BY MOUTH ONCE DAILY BEFORE BREAKFAST  furosemide (LASIX) 20 mg tablet TAKE 2 TABLETS BY MOUTH ONCE DAILY  carvedilol (COREG) 6.25 mg tablet TAKE 1 TABLET BY MOUTH TWICE DAILY  escitalopram oxalate (LEXAPRO) 5 mg tablet Take 0.5 Tabs by mouth daily. Indications: Repeated Episodes of Anxiety  losartan (COZAAR) 100 mg tablet Take 1 Tab by mouth daily.  hydrALAZINE (APRESOLINE) 50 mg tablet TAKE ONE TABLET BY MOUTH 4 TIMES DAILY FOR  HYPERTENSION  simvastatin (ZOCOR) 40 mg tablet Take 1 Tab by mouth nightly.  traMADol (ULTRAM) 50 mg tablet Take 1 Tab by mouth every six (6) hours as needed for Pain. Max Daily Amount: 200 mg.  
 nitroglycerin (NITROBID) 2 % ointment Apply 0.5 Inches to affected area every four (4) hours. Apply 1/2 inch to the chest when systolic blood pressure is > 170.  albuterol (PROVENTIL HFA, VENTOLIN HFA, PROAIR HFA) 90 mcg/actuation inhaler Take 2 Puffs by inhalation every six (6) hours as needed for Wheezing (use her insurance's generic).  fexofenadine (ALLEGRA) 180 mg tablet Take 1 Tab by mouth daily. For allergies.  omega-3 fatty acids-vitamin e (FISH OIL) 1,000 mg Cap Take 1 Cap by mouth.  aspirin delayed-release 81 mg tablet Take 81 mg by mouth daily.  cholecalciferol, vitamin d3, (VITAMIN D) 1,000 unit tablet Take 1,000 Units by mouth daily.  cyanocobalamin (VITAMIN B-12) 1,000 mcg tablet Take 1,000 mcg by mouth daily.  simvastatin (ZOCOR) 40 mg tablet Take 1 Tab by mouth nightly for 90 days. Indications: mixed hyperlipidemia No current facility-administered medications for this visit. Patient Active Problem List  
 Diagnosis Date Noted  Orthostasis 07/29/2019 Priority: 1 - One  
 CKD (chronic kidney disease) stage 4, GFR 15-29 ml/min ([facility]) 04/15/2019 Priority: 1 - One  Anemia, chronic disease 03/12/2019 Priority: 1 - One  Malignant HTN with heart disease, w/o CHF, with chronic kidney disease 05/26/2017 Priority: 1 - One  
 Hypothyroidism 08/05/2013 Priority: 1 - One  
 CRI (chronic renal insufficiency) 02/08/2011 Priority: 1 - One  
 Hyperlipidemia 05/24/2010 Priority: 1 - One  Arthritis 05/24/2010 Priority: 1 - One  
 Hx-TIA (transient ischemic attack) 05/24/2010 Priority: 1 - One  Migraine 05/24/2010 Priority: 1 - One  
 Situational stress 06/12/2018 Priority: 6 - Six  S/P hysterectomy 10/10/2014 Priority: 6 - Six Past Medical History:  
Diagnosis Date  Arthritis 5/24/2010  
 Hx-TIA (transient ischemic attack) 5/24/2010  Hyperlipidemia 5/24/2010  Hypertension 5/24/2010  Migraine 5/24/2010 Allergies Allergen Reactions  Ace Inhibitors Other (comments) Cough. Also has unclear reaction to ARB's Past Surgical History:  
Procedure Laterality Date  HX GYN    
 hysterectomy,btl  HX GYN  9/6/11  
 complete hysterectomy  HX HEENT  10/10. bilateral cataract removal.  
 
 
Social History Socioeconomic History  Marital status:  Spouse name: Not on file  Number of children: Not on file  Years of education: Not on file  Highest education level: Not on file Tobacco Use  Smoking status: Never Smoker  Smokeless tobacco: Never Used Substance and Sexual Activity  Alcohol use: No  
 Drug use: No  
 
 
Review of Systems Constitutional: Negative. Negative for chills, fever, malaise/fatigue and weight loss. She feels remarkably well to have severe kidney disease. Her blood pressures are controlled. HENT: Negative. Negative for hearing loss. Eyes: Negative. Negative for blurred vision and double vision. Respiratory: Negative. Negative for cough, sputum production and shortness of breath. Cardiovascular: Negative. Negative for chest pain and palpitations. Gastrointestinal: Negative. Negative for abdominal pain, blood in stool, heartburn, nausea and vomiting. Genitourinary: Negative. Negative for dysuria, frequency and urgency. Musculoskeletal: Negative. Negative for back pain, falls, myalgias and neck pain. Skin: Negative. Negative for rash. Neurological: Negative. Negative for dizziness, tingling, tremors, weakness and headaches. Endo/Heme/Allergies: Negative. Psychiatric/Behavioral: Negative. Negative for depression. Objective:  
 
Vitals:  
 11/25/19 1505 11/25/19 1536 BP: 146/80 110/63 Pulse: 66 Resp: 16 Temp: 98.5 °F (36.9 °C) TempSrc: Oral   
SpO2: 99% Weight: 99 lb (44.9 kg) Height: 5' 3\" (1.6 m) Body mass index is 17.54 kg/m². Physical Exam 
Vitals signs and nursing note reviewed. Constitutional:   
   General: She is not in acute distress. Appearance: She is not diaphoretic. HENT:  
   Head: Normocephalic and atraumatic. Eyes:  
   Conjunctiva/sclera: Conjunctivae normal.  
Neck:  
   Thyroid: No thyromegaly. Comments: No carotid bruit. Cardiovascular:  
   Rate and Rhythm: Normal rate and regular rhythm. Heart sounds: No murmur. No friction rub. No gallop. Pulmonary:  
   Effort: Pulmonary effort is normal. No respiratory distress. Breath sounds: Normal breath sounds. No wheezing or rales. Abdominal:  
   General: There is no distension. Palpations: Abdomen is soft. Tenderness: There is no tenderness. There is no guarding. Musculoskeletal:     
   General: No swelling. Skin: 
   General: Skin is warm. Findings: No erythema or rash. Neurological:  
   Mental Status: She is alert and oriented to person, place, and time. Psychiatric:     
   Mood and Affect: Mood and affect normal.  
 
 
 
 
There are no preventive care reminders to display for this patient. Assessment and orders:  
 
Encounter Diagnoses ICD-10-CM ICD-9-CM 1. Malignant HTN with heart disease, w/o CHF, with chronic kidney disease I13.10 404.00 2. Pure hypercholesterolemia E78.00 272.0 3. Hypothyroidism due to acquired atrophy of thyroid E03.4 244.8  
  246.8 4. Hospice care Z51.5 V66.7 5. CKD (chronic kidney disease) stage 4, GFR 15-29 ml/min ([facility]) N18.4 585.4 Diagnoses and all orders for this visit: 
 
1. Malignant HTN with heart disease, w/o CHF, with chronic kidney disease 
-     RENAL FUNCTION PANEL; Future 
-     PTH INTACT; Future 
-     HEMOGLOBIN; Future 2. Pure hypercholesterolemia 3. Hypothyroidism due to acquired atrophy of thyroid 4. Hospice care 5. CKD (chronic kidney disease) stage 4, GFR 15-29 ml/min ([facility]) 
-     RENAL FUNCTION PANEL; Future 
-     PTH INTACT; Future 
-     HEMOGLOBIN; Future Plan of care: 
Discussed diagnoses in detail with patient. Medication risks/benefits/side effects discussed with patient. All of the patient's questions were addressed. The patient understands and agrees with our plan of care. The patient knows to call back if they are unsure of or forget any changes we discussed today or if the symptoms change. The patient received an After-Visit Summary which contains VS, orders, medication list and allergy list. This can be used as a \"mini-medical record\" should they have to seek medical care while out of town. Patient Care Team: 
Leena Cortez MD as PCP - Oneal Hahn MD as PCP - [facility] Empaneled Provider Sarah Slade MD (Nephrology) Mauro Sweeney RN as Ambulatory Care Manager Follow-up and Dispositions · Return in about 3 months (around 2/25/2020). No future appointments. Signed By: Breann Thurston MD   
 November 25, 2019 ATTENTION:  
This medical record was transcribed using an electronic medical records/speech recognition system. Although proofread, it may and can contain electronic, spelling and other errors. Corrections may be executed at a later time. Please feel free to contact me for any clarifications as needed.

## 2019-11-25 NOTE — PROGRESS NOTES
1. Have you been to the ER, urgent care clinic since your last visit? Hospitalized since your last visit? No 
 
2. Have you seen or consulted any other health care providers outside of the [address] [place] [address] since your last visit? Include any pap smears or colon screening. No 
Reviewed record in preparation for visit and have necessary documentation Pt did not bring medication to office visit for review 
opportunity was given for questions Goals that were addressed and/or need to be completed during or after this appointment include Health Maintenance Due Topic Date Due  Pneumococcal 65+ years (2 of 2 - PPSV23) 12/10/2016

## 2020-01-01 ENCOUNTER — TELEPHONE (OUTPATIENT)
Dept: FAMILY MEDICINE CLINIC | Age: 85
End: 2020-01-01

## 2020-01-01 RX ORDER — AMLODIPINE BESYLATE 2.5 MG/1
2.5 TABLET ORAL DAILY
Qty: 30 TAB | Refills: 2 | Status: SHIPPED | OUTPATIENT
Start: 2020-01-01 | End: 2020-01-01

## 2020-02-18 NOTE — TELEPHONE ENCOUNTER
Returned phone call to Andie Clark, no answer. Need to advise her that Dr. Yazan Love is stopping Felodipine and sending in Amlodipine 2.5 mg for patient to take daily.

## 2020-02-18 NOTE — TELEPHONE ENCOUNTER
ISIDORO GARCIA/[facility]/  - Pt's BP medication Felodipine 5 mg - the Pharmacy is not able to supply this medication any more. An old medication. Calling to see what the alternative medication would be?

## 2020-02-18 NOTE — TELEPHONE ENCOUNTER
Spoke with Ronda Brown and advised her that Dr. Verenice Carmona is changing the patients blood pressure medication to Amlodipine 2.5 mg for her to take daily. She should stop taking the Felodipine. Ronda Brown verbalized understanding.

## 2024-10-13 NOTE — PROGRESS NOTES
[address], [address] [place] [address]  [phone]           Date of visit: 4/3/2017     Jos Hernadez MD    This is an Subsequent Medicare Annual Wellness Visit (AWV), (Performed more than 12 months after effective date of Medicare Part B enrollment and 12 months after last preventive visit, Once in a lifetime)    I have reviewed the patient's medical history in detail and updated the computerized patient record. Gerhard Peterson is a 80 y.o. female   History obtained from: the patient. Concerns today   (Patient understands that medical problems addressed today may incur additional cost as this is a preventive visit)    History     Patient Active Problem List   Diagnosis Code    Hypertension I10    Hyperlipidemia E78.5    Arthritis M19.90    Hx-TIA (transient ischemic attack) Z86.73    Migraine G43.909    CRI (chronic renal insufficiency) N18.9    Hypothyroidism E03.9    S/P hysterectomy Z90.710     Past Medical History:   Diagnosis Date    Arthritis 5/24/2010    Hx-TIA (transient ischemic attack) 5/24/2010    Hyperlipidemia 5/24/2010    Hypertension 5/24/2010    Migraine 5/24/2010      Past Surgical History:   Procedure Laterality Date    HX GYN      hysterectomy,btl    HX GYN  9/6/11    complete hysterectomy    HX HEENT  10/10. bilateral cataract removal.     Allergies   Allergen Reactions    Ace Inhibitors Other (comments)     Cough. Also has unclear reaction to ARB's     Current Outpatient Prescriptions   Medication Sig Dispense Refill    albuterol (PROVENTIL HFA, VENTOLIN HFA, PROAIR HFA) 90 mcg/actuation inhaler Take 2 Puffs by inhalation every six (6) hours as needed for Wheezing (use her insurance's generic). 1 Inhaler 5    felodipine (PLENDIL SR) 10 mg 24 hr tablet Take 1 Tab by mouth daily. 90 Tab 1    furosemide (LASIX) 20 mg tablet Take 1 Tab by mouth daily.  Indications: hypertension 90 Tab 1    hydrALAZINE Pt arrived to ED via private vehicle accompanied by mom w/CC wrist injury. Fell on outstretched left hand at gymnastics. Having pain to left wrist. CMS intact. No meds for pain pta. Otherwise healthy child.    (APRESOLINE) 25 mg tablet Take 1 Tab by mouth three (3) times daily. Indications: hypertension 90 Tab 5    levothyroxine (SYNTHROID) 25 mcg tablet Take 1 Tab by mouth Daily (before breakfast). 90 Tab 3    losartan (COZAAR) 100 mg tablet Take 1 Tab by mouth daily. 90 Tab 1    simvastatin (ZOCOR) 40 mg tablet Take 1 Tab by mouth nightly for 90 days. Indications: mixed hyperlipidemia 90 Tab 2    fexofenadine (ALLEGRA) 180 mg tablet Take 1 Tab by mouth daily. For allergies. 30 Tab 5    omega-3 fatty acids-vitamin e (FISH OIL) 1,000 mg Cap Take 1 Cap by mouth.  aspirin delayed-release 81 mg tablet Take 81 mg by mouth daily.  cholecalciferol, vitamin d3, (VITAMIN D) 1,000 unit tablet Take 1,000 Units by mouth daily.  cyanocobalamin (VITAMIN B-12) 1,000 mcg tablet Take 1,000 mcg by mouth daily. Family History   Problem Relation Age of Onset    Heart Disease Sister      Social History   Substance Use Topics    Smoking status: Never Smoker    Smokeless tobacco: Never Used    Alcohol use No       Specialists/Care Team   Patricia Kahn has established care with the following healthcare providers:  Patient Care Team:  Felipe Do MD as PCP - [place] [address]     Demographics   female  80 y.o.     General Health Questions   -During the past 4 weeks:   -how would you rate your health in general? Fair   -how often have you been bothered by feeling dizzy when standing up? never   -how much have you been bothered by bodily pain? not   -Have you noticed any hearing difficulties? no   -has your physical and emotional health limited your social activities with family or friends? no    Emotional Health Questions   -Do you have a history of depression, anxiety, or emotional problems? no  -Over the past 2 weeks, have you felt down, depressed or hopeless? no  -Over the past 2 weeks, have you felt little interest or pleasure in doing things? no    Health Habits   Please describe your diet habits: poor apetitite. Lost taste after URI. .  Do you get 5 servings of fruits or vegetables daily? yes  Do you exercise regularly? yes    Alcohol Risk Factor Screening: On any occasion during the past 3 months, have you had more than 4 drinks containing alcohol? No     Do you average more than 14 drinks per week? No       Activities of Daily Living and Functional Status   -Do you need help with eating, walking, dressing, bathing, toileting, the phone, transportation, shopping, preparing meals, housework, laundry, medications or managing money? yes  -In the past four weeks, was someone available to help you if you needed and wanted help with anything? yes  -Are you confident are you that you can control and manage most of your health problems? yes  -Have you been given information to help you keep track of your medications? yes  -How often do you have trouble taking your medications as prescribed? never    Fall Risk and Home Safety   Have you fallen 2 or more times in the past year? no  Does your home have rugs in the hallway, lack grab bars in the bathroom, lack handrails on the stairs or have poor lighting? no  Do you have smoke detectors and check them regularly? yes  Do you have difficulties driving a car? yes  Do you always fasten your seat belt when you are in a car? yes    Review of Systems (if indicated for problems addressed today)   Review of Systems   Constitutional: Negative. Negative for chills, fever, malaise/fatigue and weight loss. HENT: Negative. Negative for hearing loss. Lost taste after recent infection. Eyes: Negative. Negative for blurred vision and double vision. Respiratory: Negative. Negative for cough, hemoptysis, sputum production and shortness of breath. Cardiovascular: Negative. Negative for chest pain, palpitations and orthopnea. Gastrointestinal: Negative. Negative for abdominal pain, blood in stool, heartburn, nausea and vomiting.    Genitourinary: Negative. Negative for dysuria, frequency and urgency. Musculoskeletal: Negative. Negative for back pain, myalgias and neck pain. Skin: Negative. Negative for rash. Neurological: Negative. Negative for dizziness, tingling, tremors, weakness and headaches. Endo/Heme/Allergies: Negative. Psychiatric/Behavioral: Negative. Negative for depression. Physical Examination     Wt Readings from Last 3 Encounters:   04/03/17 124 lb (56.2 kg)   03/24/17 124 lb (56.2 kg)   11/22/16 122 lb 12.8 oz (55.7 kg)     Temp Readings from Last 3 Encounters:   04/03/17 98.3 °F (36.8 °C) (Oral)   03/24/17 97.8 °F (36.6 °C) (Oral)   11/22/16 97.1 °F (36.2 °C) (Oral)     BP Readings from Last 3 Encounters:   04/03/17 175/66   03/24/17 144/59   11/22/16 149/62     Pulse Readings from Last 3 Encounters:   04/03/17 67   03/24/17 74   11/22/16 (!) 55       Body mass index is 21.97 kg/(m^2). No exam data present  Was the patient's timed Up & Go test unsteady or longer than 10 seconds? yes    Evaluation of Cognitive Function   Mood/affect:  happy  Orientation: Person, Place, Time and Situation  Appearance: age appropriate and casually dressed  Family member/caregiver input: no    Additional exam if indicated for problems addressed today:  Physical Exam   Constitutional: She is oriented to person, place, and time. She appears well-developed and well-nourished. HENT:   Head: Normocephalic and atraumatic. Eyes: Conjunctivae and EOM are normal. Pupils are equal, round, and reactive to light. Neck: Normal range of motion. Neck supple. No tracheal deviation present. No thyromegaly present. Cardiovascular: Normal rate, regular rhythm, normal heart sounds and intact distal pulses. Exam reveals no gallop and no friction rub. No murmur heard. Pulmonary/Chest: Effort normal and breath sounds normal. No respiratory distress. She has no wheezes. Abdominal: Soft. Bowel sounds are normal. She exhibits no distension.  There is no tenderness. There is no rebound. Neurological: She is alert and oriented to person, place, and time. She has normal reflexes. No cranial nerve deficit. Coordination normal.   Skin: Skin is warm. No rash noted. No erythema. No pallor. Psychiatric: She has a normal mood and affect. Her behavior is normal. Thought content normal.         Advice/Referrals/Counseling (as indicated)   Education and counseling provided for any problems identified above:     Preventive Services     (Preventive care checklist to be included in patient instructions)  Discussed today Done Previously Not Needed     x  Pneumococcal vaccines    x  Flu vaccine     x Hepatitis B vaccine (if at risk)   x   Shingles vaccine    x  TDAP vaccine     x Mammogram     x Pap smear     x Colorectal cancer screening     x Low-dose CT for lung cancer screening     x Bone density test    x  Glaucoma screening    x  Cholesterol test    x  Diabetes screening test      x Diabetes self-management class     x Nutritionist referral for diabetes or renal disease     Discussion of Advance Directive   Discussed with Patricia Kahn her ability to prepare and advance directive in the case that an injury or illness causes her to be unable to make health care decisions. Assessment/Plan   Diagnoses and all orders for this visit:    Medicare annual wellness visit, subsequent    Essential hypertension with goal blood pressure less than 140/90  -     hydrALAZINE (APRESOLINE) 50 mg tablet; Take 1 Tab by mouth three (3) times daily.  Indications: hypertension    Screening for alcoholism    Screening for depression        Patient Care Team:  Gamaliel Hameed MD as PCP - General    Follow-up Disposition: Not on File    Future Appointments  Date Time Provider Tiago De Oliveira   5/23/2017 9:50 AM MD Olga Montoya MD